# Patient Record
Sex: FEMALE | Race: BLACK OR AFRICAN AMERICAN | NOT HISPANIC OR LATINO | URBAN - METROPOLITAN AREA
[De-identification: names, ages, dates, MRNs, and addresses within clinical notes are randomized per-mention and may not be internally consistent; named-entity substitution may affect disease eponyms.]

---

## 2017-06-17 ENCOUNTER — INPATIENT (INPATIENT)
Facility: HOSPITAL | Age: 82
LOS: 9 days | Discharge: NURSING, REHAB OR LONG-TERM CARE | End: 2017-06-27
Attending: HOSPITALIST | Admitting: INTERNAL MEDICINE
Payer: MEDICARE

## 2017-06-17 VITALS
HEIGHT: 65 IN | OXYGEN SATURATION: 100 % | TEMPERATURE: 98 F | RESPIRATION RATE: 18 BRPM | HEART RATE: 81 BPM | WEIGHT: 130.07 LBS | SYSTOLIC BLOOD PRESSURE: 147 MMHG | DIASTOLIC BLOOD PRESSURE: 70 MMHG

## 2017-06-17 DIAGNOSIS — Z89.512 ACQUIRED ABSENCE OF LEFT LEG BELOW KNEE: Chronic | ICD-10-CM

## 2017-06-17 LAB
ALBUMIN SERPL ELPH-MCNC: 2.7 G/DL — LOW (ref 3.3–5)
ALP SERPL-CCNC: 113 U/L — SIGNIFICANT CHANGE UP (ref 40–120)
ALT FLD-CCNC: 11 U/L — LOW (ref 12–78)
ANION GAP SERPL CALC-SCNC: 5 MMOL/L — SIGNIFICANT CHANGE UP (ref 5–17)
ANION GAP SERPL CALC-SCNC: 6 MMOL/L — SIGNIFICANT CHANGE UP (ref 5–17)
APPEARANCE UR: (no result)
AST SERPL-CCNC: 14 U/L — LOW (ref 15–37)
BACTERIA # UR AUTO: (no result)
BASE EXCESS BLDA CALC-SCNC: 3 MMOL/L — HIGH (ref -2–2)
BASOPHILS # BLD AUTO: 0.1 K/UL — SIGNIFICANT CHANGE UP (ref 0–0.2)
BASOPHILS NFR BLD AUTO: 0.8 % — SIGNIFICANT CHANGE UP (ref 0–2)
BILIRUB SERPL-MCNC: 0.4 MG/DL — SIGNIFICANT CHANGE UP (ref 0.2–1.2)
BILIRUB UR-MCNC: NEGATIVE — SIGNIFICANT CHANGE UP
BUN SERPL-MCNC: 16 MG/DL — SIGNIFICANT CHANGE UP (ref 7–23)
BUN SERPL-MCNC: 20 MG/DL — SIGNIFICANT CHANGE UP (ref 7–23)
CALCIUM SERPL-MCNC: 8.5 MG/DL — SIGNIFICANT CHANGE UP (ref 8.5–10.1)
CALCIUM SERPL-MCNC: 8.9 MG/DL — SIGNIFICANT CHANGE UP (ref 8.5–10.1)
CHLORIDE SERPL-SCNC: 115 MMOL/L — HIGH (ref 96–108)
CHLORIDE SERPL-SCNC: 129 MMOL/L — HIGH (ref 96–108)
CO2 SERPL-SCNC: 27 MMOL/L — SIGNIFICANT CHANGE UP (ref 22–31)
CO2 SERPL-SCNC: 31 MMOL/L — SIGNIFICANT CHANGE UP (ref 22–31)
COLOR SPEC: YELLOW — SIGNIFICANT CHANGE UP
CREAT SERPL-MCNC: 1.01 MG/DL — SIGNIFICANT CHANGE UP (ref 0.5–1.3)
CREAT SERPL-MCNC: 1.02 MG/DL — SIGNIFICANT CHANGE UP (ref 0.5–1.3)
DIFF PNL FLD: (no result)
EOSINOPHIL # BLD AUTO: 0.1 K/UL — SIGNIFICANT CHANGE UP (ref 0–0.5)
EOSINOPHIL NFR BLD AUTO: 0.9 % — SIGNIFICANT CHANGE UP (ref 0–6)
EPI CELLS # UR: SIGNIFICANT CHANGE UP
GAS PNL BLDA: SIGNIFICANT CHANGE UP
GLUCOSE SERPL-MCNC: 248 MG/DL — HIGH (ref 70–99)
GLUCOSE SERPL-MCNC: 512 MG/DL — CRITICAL HIGH (ref 70–99)
GLUCOSE UR QL: 250 MG/DL
HCO3 BLDA-SCNC: 28 MMOL/L — SIGNIFICANT CHANGE UP (ref 21–29)
HCT VFR BLD CALC: 45.2 % — HIGH (ref 34.5–45)
HGB BLD-MCNC: 14.1 G/DL — SIGNIFICANT CHANGE UP (ref 11.5–15.5)
KETONES UR-MCNC: NEGATIVE — SIGNIFICANT CHANGE UP
LACTATE SERPL-SCNC: 1.6 MMOL/L — SIGNIFICANT CHANGE UP (ref 0.7–2)
LEUKOCYTE ESTERASE UR-ACNC: (no result)
LYMPHOCYTES # BLD AUTO: 2.9 K/UL — SIGNIFICANT CHANGE UP (ref 1–3.3)
LYMPHOCYTES # BLD AUTO: 26.3 % — SIGNIFICANT CHANGE UP (ref 13–44)
MAGNESIUM SERPL-MCNC: 2.2 MG/DL — SIGNIFICANT CHANGE UP (ref 1.6–2.6)
MCHC RBC-ENTMCNC: 29.7 PG — SIGNIFICANT CHANGE UP (ref 27–34)
MCHC RBC-ENTMCNC: 31.1 GM/DL — LOW (ref 32–36)
MCV RBC AUTO: 95.3 FL — SIGNIFICANT CHANGE UP (ref 80–100)
MONOCYTES # BLD AUTO: 0.8 K/UL — SIGNIFICANT CHANGE UP (ref 0–0.9)
MONOCYTES NFR BLD AUTO: 7 % — SIGNIFICANT CHANGE UP (ref 2–14)
NEUTROPHILS # BLD AUTO: 7.2 K/UL — SIGNIFICANT CHANGE UP (ref 1.8–7.4)
NEUTROPHILS NFR BLD AUTO: 64.9 % — SIGNIFICANT CHANGE UP (ref 43–77)
NITRITE UR-MCNC: NEGATIVE — SIGNIFICANT CHANGE UP
PCO2 BLDA: 44 MMHG — SIGNIFICANT CHANGE UP (ref 32–46)
PH BLDA: 7.42 — SIGNIFICANT CHANGE UP (ref 7.35–7.45)
PH UR: 6 — SIGNIFICANT CHANGE UP (ref 5–8)
PLATELET # BLD AUTO: 156 K/UL — SIGNIFICANT CHANGE UP (ref 150–400)
PO2 BLDA: 93 — SIGNIFICANT CHANGE UP
POTASSIUM SERPL-MCNC: 3.2 MMOL/L — LOW (ref 3.5–5.3)
POTASSIUM SERPL-MCNC: 3.6 MMOL/L — SIGNIFICANT CHANGE UP (ref 3.5–5.3)
POTASSIUM SERPL-SCNC: 3.2 MMOL/L — LOW (ref 3.5–5.3)
POTASSIUM SERPL-SCNC: 3.6 MMOL/L — SIGNIFICANT CHANGE UP (ref 3.5–5.3)
PROT SERPL-MCNC: 6.7 GM/DL — SIGNIFICANT CHANGE UP (ref 6–8.3)
PROT UR-MCNC: 30 MG/DL
RBC # BLD: 4.74 M/UL — SIGNIFICANT CHANGE UP (ref 3.8–5.2)
RBC # FLD: 14.1 % — SIGNIFICANT CHANGE UP (ref 10.3–14.5)
RBC CASTS # UR COMP ASSIST: (no result) /HPF (ref 0–4)
SAO2 % BLDA: 97 — SIGNIFICANT CHANGE UP
SODIUM SERPL-SCNC: 148 MMOL/L — HIGH (ref 135–145)
SODIUM SERPL-SCNC: 165 MMOL/L — CRITICAL HIGH (ref 135–145)
SP GR SPEC: 1.02 — SIGNIFICANT CHANGE UP (ref 1.01–1.02)
TROPONIN I SERPL-MCNC: <0.015 NG/ML — SIGNIFICANT CHANGE UP (ref 0.01–0.04)
UROBILINOGEN FLD QL: NEGATIVE MG/DL — SIGNIFICANT CHANGE UP
WBC # BLD: 11.1 K/UL — HIGH (ref 3.8–10.5)
WBC # FLD AUTO: 11.1 K/UL — HIGH (ref 3.8–10.5)
WBC UR QL: >50

## 2017-06-17 PROCEDURE — 93010 ELECTROCARDIOGRAM REPORT: CPT

## 2017-06-17 PROCEDURE — 71010: CPT | Mod: 26

## 2017-06-17 PROCEDURE — 71250 CT THORAX DX C-: CPT | Mod: 26

## 2017-06-17 PROCEDURE — 99285 EMERGENCY DEPT VISIT HI MDM: CPT

## 2017-06-17 RX ORDER — DEXTROSE 50 % IN WATER 50 %
25 SYRINGE (ML) INTRAVENOUS ONCE
Qty: 0 | Refills: 0 | Status: DISCONTINUED | OUTPATIENT
Start: 2017-06-17 | End: 2017-06-27

## 2017-06-17 RX ORDER — DEXTROSE 50 % IN WATER 50 %
1 SYRINGE (ML) INTRAVENOUS ONCE
Qty: 0 | Refills: 0 | Status: DISCONTINUED | OUTPATIENT
Start: 2017-06-17 | End: 2017-06-27

## 2017-06-17 RX ORDER — IPRATROPIUM/ALBUTEROL SULFATE 18-103MCG
3 AEROSOL WITH ADAPTER (GRAM) INHALATION ONCE
Qty: 0 | Refills: 0 | Status: COMPLETED | OUTPATIENT
Start: 2017-06-17 | End: 2017-06-17

## 2017-06-17 RX ORDER — VANCOMYCIN HCL 1 G
1000 VIAL (EA) INTRAVENOUS ONCE
Qty: 0 | Refills: 0 | Status: COMPLETED | OUTPATIENT
Start: 2017-06-17 | End: 2017-06-17

## 2017-06-17 RX ORDER — INSULIN GLARGINE 100 [IU]/ML
35 INJECTION, SOLUTION SUBCUTANEOUS ONCE
Qty: 0 | Refills: 0 | Status: COMPLETED | OUTPATIENT
Start: 2017-06-17 | End: 2017-06-17

## 2017-06-17 RX ORDER — ASPIRIN/CALCIUM CARB/MAGNESIUM 324 MG
81 TABLET ORAL DAILY
Qty: 0 | Refills: 0 | Status: DISCONTINUED | OUTPATIENT
Start: 2017-06-17 | End: 2017-06-21

## 2017-06-17 RX ORDER — GLUCAGON INJECTION, SOLUTION 0.5 MG/.1ML
1 INJECTION, SOLUTION SUBCUTANEOUS ONCE
Qty: 0 | Refills: 0 | Status: DISCONTINUED | OUTPATIENT
Start: 2017-06-17 | End: 2017-06-27

## 2017-06-17 RX ORDER — HEPARIN SODIUM 5000 [USP'U]/ML
5000 INJECTION INTRAVENOUS; SUBCUTANEOUS EVERY 8 HOURS
Qty: 0 | Refills: 0 | Status: DISCONTINUED | OUTPATIENT
Start: 2017-06-17 | End: 2017-06-22

## 2017-06-17 RX ORDER — CLOPIDOGREL BISULFATE 75 MG/1
75 TABLET, FILM COATED ORAL DAILY
Qty: 0 | Refills: 0 | Status: DISCONTINUED | OUTPATIENT
Start: 2017-06-17 | End: 2017-06-27

## 2017-06-17 RX ORDER — DOCUSATE SODIUM 100 MG
200 CAPSULE ORAL DAILY
Qty: 0 | Refills: 0 | Status: DISCONTINUED | OUTPATIENT
Start: 2017-06-17 | End: 2017-06-27

## 2017-06-17 RX ORDER — CEFEPIME 1 G/1
1000 INJECTION, POWDER, FOR SOLUTION INTRAMUSCULAR; INTRAVENOUS ONCE
Qty: 0 | Refills: 0 | Status: COMPLETED | OUTPATIENT
Start: 2017-06-17 | End: 2017-06-17

## 2017-06-17 RX ORDER — POTASSIUM CHLORIDE 20 MEQ
10 PACKET (EA) ORAL DAILY
Qty: 0 | Refills: 0 | Status: DISCONTINUED | OUTPATIENT
Start: 2017-06-17 | End: 2017-06-21

## 2017-06-17 RX ORDER — SODIUM CHLORIDE 9 MG/ML
1000 INJECTION, SOLUTION INTRAVENOUS
Qty: 0 | Refills: 0 | Status: DISCONTINUED | OUTPATIENT
Start: 2017-06-17 | End: 2017-06-27

## 2017-06-17 RX ORDER — MEMANTINE HYDROCHLORIDE 10 MG/1
10 TABLET ORAL
Qty: 0 | Refills: 0 | Status: DISCONTINUED | OUTPATIENT
Start: 2017-06-17 | End: 2017-06-27

## 2017-06-17 RX ORDER — INSULIN LISPRO 100/ML
VIAL (ML) SUBCUTANEOUS
Qty: 0 | Refills: 0 | Status: DISCONTINUED | OUTPATIENT
Start: 2017-06-17 | End: 2017-06-27

## 2017-06-17 RX ORDER — INSULIN GLARGINE 100 [IU]/ML
35 INJECTION, SOLUTION SUBCUTANEOUS AT BEDTIME
Qty: 0 | Refills: 0 | Status: DISCONTINUED | OUTPATIENT
Start: 2017-06-17 | End: 2017-06-21

## 2017-06-17 RX ORDER — DEXTROSE 50 % IN WATER 50 %
12.5 SYRINGE (ML) INTRAVENOUS ONCE
Qty: 0 | Refills: 0 | Status: DISCONTINUED | OUTPATIENT
Start: 2017-06-17 | End: 2017-06-27

## 2017-06-17 RX ORDER — POTASSIUM CHLORIDE 20 MEQ
40 PACKET (EA) ORAL ONCE
Qty: 0 | Refills: 0 | Status: COMPLETED | OUTPATIENT
Start: 2017-06-17 | End: 2017-06-17

## 2017-06-17 RX ORDER — METOPROLOL TARTRATE 50 MG
12.5 TABLET ORAL
Qty: 0 | Refills: 0 | Status: DISCONTINUED | OUTPATIENT
Start: 2017-06-17 | End: 2017-06-27

## 2017-06-17 RX ORDER — SODIUM CHLORIDE 9 MG/ML
1000 INJECTION INTRAMUSCULAR; INTRAVENOUS; SUBCUTANEOUS
Qty: 0 | Refills: 0 | Status: DISCONTINUED | OUTPATIENT
Start: 2017-06-17 | End: 2017-06-18

## 2017-06-17 RX ORDER — MIRTAZAPINE 45 MG/1
15 TABLET, ORALLY DISINTEGRATING ORAL AT BEDTIME
Qty: 0 | Refills: 0 | Status: DISCONTINUED | OUTPATIENT
Start: 2017-06-17 | End: 2017-06-27

## 2017-06-17 RX ORDER — SIMVASTATIN 20 MG/1
10 TABLET, FILM COATED ORAL AT BEDTIME
Qty: 0 | Refills: 0 | Status: DISCONTINUED | OUTPATIENT
Start: 2017-06-17 | End: 2017-06-27

## 2017-06-17 RX ORDER — DOCUSATE SODIUM 100 MG
200 CAPSULE ORAL DAILY
Qty: 0 | Refills: 0 | Status: DISCONTINUED | OUTPATIENT
Start: 2017-06-17 | End: 2017-06-17

## 2017-06-17 RX ORDER — CEFEPIME 1 G/1
2000 INJECTION, POWDER, FOR SOLUTION INTRAMUSCULAR; INTRAVENOUS EVERY 12 HOURS
Qty: 0 | Refills: 0 | Status: DISCONTINUED | OUTPATIENT
Start: 2017-06-17 | End: 2017-06-18

## 2017-06-17 RX ADMIN — Medication 250 MILLIGRAM(S): at 19:55

## 2017-06-17 RX ADMIN — Medication 40 MILLIEQUIVALENT(S): at 23:05

## 2017-06-17 RX ADMIN — Medication 3 MILLILITER(S): at 16:35

## 2017-06-17 RX ADMIN — CEFEPIME 100 MILLIGRAM(S): 1 INJECTION, POWDER, FOR SOLUTION INTRAMUSCULAR; INTRAVENOUS at 19:35

## 2017-06-17 RX ADMIN — INSULIN GLARGINE 35 UNIT(S): 100 INJECTION, SOLUTION SUBCUTANEOUS at 23:00

## 2017-06-17 NOTE — ED PROVIDER NOTE - MEDICAL DECISION MAKING DETAILS
Pt with bilateral lower lob pna, hypernatremia to 165, normal Cr.  LA 1.6.  Given Vanc, Cefepime for HCAP.  Pt oriented to self, which is her baseline according to family.  IVF in abx total 300 cc.  Will repeat BMP after abx, to determine if correcting Na too fast or not.  If not coming down too fast, will start on D5 @ 70 cc/hr.

## 2017-06-17 NOTE — H&P ADULT - PMH
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

## 2017-06-17 NOTE — ED PROVIDER NOTE - OBJECTIVE STATEMENT
84 yo hx of DMII, PVD s/p R BKA, dementia, presents with CC of lethargy, cough.  Cough X 2 weeks, dry, nonproductive.  C/o nonbilious vomiting episode yesterday, for which she received IVF.  Had labs and CXR a week ago, which was apparently normal.  Was brought to ED today because family concerned about patient being more lethargic.  Pt with dementia, unable to participate in ROS or HPI.  Family denies any other concerns at this time.

## 2017-06-17 NOTE — H&P ADULT - NSHPSOCIALHISTORY_GEN_ALL_CORE
unable to obtain at this time  Comes from Living facility unable to obtain at this time  Comes from Assisted Living facility

## 2017-06-17 NOTE — H&P ADULT - PROBLEM SELECTOR PLAN 5
Hx of Atrial Fib  1 On tele monitor is in SR, Brief Run on SVT   2 Pt rate controlled with Cardizem and Metoprolol  3Not on Any Anticoagulation as per med review , Will need more information regarding why pt is not on Anticoagulation   CHADsVasc is 6

## 2017-06-17 NOTE — ED ADULT NURSE NOTE - OBJECTIVE STATEMENT
Pt BIBA from [facility] at daughters request for cough for 3 weeks and SOB and lethargy. Pt has dementia, pt's daughter reports she is at baseline mental status. Per pt's daughter, pt vomited and was given IV fluids at [facility] last night. Pt has left BKA from Jan 2014. Pt has no LE edema on right side. Pt has not been coughing since arrival.  Pt is lethargic.

## 2017-06-17 NOTE — ED ADULT NURSE REASSESSMENT NOTE - NS ED NURSE REASSESS COMMENT FT1
Pt cleaned and linens changed, pt straight cathed as per MD order, urine collected and sent as per order. Collected approx 75cc or urine during catheterization. Pt repositioned for comfort. Slight swelling noted to the right arm, IV checked and verified by two RNs, blood pressure cuff moved to opposite arm. MD Gardiner made aware of swelling, extremity elevated on pillows. Awaiting hospitalist evaluation and admission orders. Will continue to monitor.

## 2017-06-17 NOTE — H&P ADULT - ASSESSMENT
82 y/o F with Pmhx of DM Type II on INsulin (LAst A1c 10.6), PVD s/p Left  BKA, dementia, Afib, Major depression, HTN who presented to the ED with C/o lethargy, cough and previous vomiting

## 2017-06-17 NOTE — ED ADULT NURSE REASSESSMENT NOTE - COMFORT CARE
side rails up/warm blanket provided/family is present at bedside, patient is sleeping, brief is clean, hourly rounding completed

## 2017-06-17 NOTE — ED ADULT NURSE REASSESSMENT NOTE - NS ED NURSE REASSESS COMMENT FT1
MD Horan, hospitalist evaluated pt for admission. Pt remains on bedside monitor. Phlebotomy ronny additional lab work. Pt sitting up in stretcher in no apparent distress, pt repositioned for comfort. Will continue to monitor.

## 2017-06-17 NOTE — H&P ADULT - HISTORY OF PRESENT ILLNESS
No family availbale at bedside and pt has dementia . Hx obtained from Chart /ED physician Dr. Gardiner who spoke to the family and Nursing facility Chart     Pt is a  84 y/o F with Pmhx of DM Type II on INsulin (LAst A1c 10.6), PVD s/p Left  BKA, dementia,Afib,Major depression, HTN who presented to the ED with C/o lethargy, cough.  Cough X 2 weeks, dry, nonproductive. She is alert at baseline but not oriented and has underlying dementia. Non verbal and req held with feeding. Incontinent with bladder and bowel. There are also C/o nonbilious vomiting episode yesterday, for which she received IVF in the facility .She Had labs and CXR a week ago, which shows Na as 148 and Cr. as 0.7 GFR was >60.  Was brought to ED today because family concerned about patient being more lethargic.   in ED : given Vanco + Cefepime CT scan showed possible b/l PNA.  Sodiumwas elevated at 165 (Corrected sodium was 169) No family availbale at bedside and pt has dementia . Hx obtained from Chart /ED physician Dr. Gardiner who spoke to the family and Nursing facility Chart     Pt is a  82 y/o F with Pmhx of DM Type II on INsulin (LAst A1c 10.6), PVD s/p Left  BKA, dementia,Afib,Major depression, HTN who presented to the ED with C/o lethargy, cough.  Cough X 2 weeks, dry, nonproductive. She is alert at baseline but not oriented and has underlying dementia. Non verbal and req held with feeding. Incontinent with bladder and bowel. There are also C/o nonbilious vomiting episode yesterday, for which she received IVF in the facility .She Had labs and CXR a week ago, which shows Na as 148 and Cr. as 0.7 GFR was >60.  Was brought to ED today because family concerned about patient being more lethargic.   in ED : given Vanco  1 gm + Cefepime 1gm ,CT scan showed possible b/l PNA.  Sodium was elevated at 165 (Corrected sodium was 169) which rapidly corrected to 155 (corrected ) after 300 ml of D5 given over 90 min in conjunction with Abx. No family availbale at bedside and pt has dementia . Hx obtained from Chart /ED physician Dr. Gardiner who spoke to the family and Nursing facility Chart     Pt is a  82 y/o F with Pmhx of DM Type II on INsulin (LAst A1c 10.6), PVD s/p Left  BKA, dementia, Afib, Major depression, HTN who presented to the ED with C/o lethargy, cough.         Cough X 2 weeks, dry, nonproductive. She is alert at baseline but not oriented and has underlying dementia. Non verbal and req held with feeding. Incontinent with bladder and bowel. There are also C/o nonbilious vomiting episode yesterday, for which she received IVF in the facility .She Had labs and CXR a week ago, which shows Na as 148 and Cr as 0.7 GFR was >60.  Was brought to ED today because family concerned about patient being more lethargic.   in ED : given Vanco  1 gm + Cefepime 1gm, CT scan of chest showed possible b/l PNA.  Sodium was elevated at 165 (Corrected sodium was 169) which rapidly corrected to 155 (corrected ) after 300 ml of D5 given over 90 min in conjunction with Abx.  On telemetry she had multiple beat run of SVT

## 2017-06-17 NOTE — H&P ADULT - PROBLEM SELECTOR PLAN 6
DMII,Last A1C is 10.6 (as per chart in June )  1 Cont with Lantus (at home on levemir ) 35 units HS   2 COnt with ISS   3 On a statin DMII,w poor control as last Hb A1C is 10.6 (as per chart in June )  1 Cont with Lantus (at home on levemir ) 35 units HS   2 Cont with ISS   3 On a statin

## 2017-06-17 NOTE — H&P ADULT - PROBLEM SELECTOR PLAN 1
Admit to Tele , pt had few beats of SVT and has hx of Afib.   Admit under Dr. Reyes and t/f to Dr. Roberts in am   1 PNA with suspected GNR , pt comes from nursing facility   2 CHest CT reviewed , possible PNA right >left   3 Given Vancomycin and Cefepime in ED  - Will cont Cefepime 2 gm IV Q12H (Renally Dosed)  4 ID consult   5 supplemental O2   6 Supportive Care   Pt able to pass bedside swallow eval.  - Does not meet SIRS criteria , lactate 1.6 Admit to Tele , pt had few beats of SVT and has hx of Afib.   Admit under Dr. Horan and t/f to Dr. Roberts in am   1. PNA with suspected GNR , pt comes from nursing facility   2 CHest CT reviewed , possible PNA right >left   3 Given Vancomycin and Cefepime in ED  - Will cont Cefepime 2 gm IV Q12H (Renally Dosed)  4 ID consult   5 supplemental O2   6 Supportive Care   Pt able to pass bedside swallow eval.  - Does not meet SIRS criteria , lactate 1.6

## 2017-06-17 NOTE — ED ADULT NURSE REASSESSMENT NOTE - NS ED NURSE REASSESS COMMENT FT1
Dr. Gardiner to the bedside to speak to family members x 2 in regards of plan of care - Family verbalizes understanding of plan of care and possible admission to the hospital- cardiac monitor in place- IV Antibiotics infusing as ordered. Will cont to monitor- safety maintained- Family remains at the bedside-

## 2017-06-17 NOTE — H&P ADULT - PROBLEM SELECTOR PLAN 2
Likely due to GI loss and Dec PO intake  1 Free water defecit is 5.5 L   2 Given 300 Ml D5 in 90 min , Corrected Na improved to 155 meq   3 Target goal of 10 meq in 24 hr achieved, Will stop Further correction  4 Give NS @ 75 ml/he as maintenance fluids Likely due to GI loss and decreased PO intake  1 Free water defecit is 5.5 L   2 Given 300 ml D5W in 90 min , Corrected Na improved to 155 meq   3 Target goal of 10 meq in 24 hr achieved, Will stop Further correction  4 Give NS @ 75 ml/hr as maintenance fluids  5. repeat BMP in AM

## 2017-06-17 NOTE — ED PROVIDER NOTE - PMH
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

## 2017-06-17 NOTE — ED ADULT NURSE REASSESSMENT NOTE - NS ED NURSE REASSESS COMMENT FT1
Several attempts made by multiple RNs to obtain urine via straight catheterization. Unable to obtain urine sample. Dr Gardiner made aware

## 2017-06-17 NOTE — ED ADULT NURSE REASSESSMENT NOTE - NS ED NURSE REASSESS COMMENT FT1
Care of pt received from Michelle Puri RN, pt resting in stretcher at time of report on bedside monitor. RN at pt bedside collecting additional labs, family at bedside. Pt to be medicated after additional lab work collected. Will continue to monitor.

## 2017-06-17 NOTE — H&P ADULT - NSHPLABSRESULTS_GEN_ALL_CORE
CARDIAC MARKERS ( 2017 17:47 )  <0.015 ng/mL / x     / x     / x     / x                                14.1   11.1  )-----------( 156      ( 2017 17:47 )             45.2     2017 21:38    148    |  115    |  16     ----------------------------<  512    3.2     |  27     |  1.02     Ca    8.5        2017 21:38  Mg     2.2       2017 17:47    TPro  6.7    /  Alb  2.7    /  TBili  0.4    /  DBili  x      /  AST  14     /  ALT  11     /  AlkPhos  113    2017 17:47      CAPILLARY BLOOD GLUCOSE  201 (2017 15:59)    LIVER FUNCTIONS - ( 2017 17:47 )  Alb: 2.7 g/dL / Pro: 6.7 gm/dL / ALK PHOS: 113 U/L / ALT: 11 U/L / AST: 14 U/L / GGT: x           Urinalysis Basic - ( 2017 21:37 )    Color: Yellow / Appearance: very cloudy / S.025 / pH: x  Gluc: x / Ketone: Negative  / Bili: Negative / Urobili: Negative mg/dL   Blood: x / Protein: 30 mg/dL / Nitrite: Negative   Leuk Esterase: Moderate / RBC: 25-50 /HPF / WBC >50   Sq Epi: x / Non Sq Epi: Few / Bacteria: Moderate      CT chest 17    IMPRESSION:    Patchy airspace opacities at the lung bases which may be infectious in  etiology. Follow-up is suggested until resolution.    Indeterminate 1 cm exophytic lesion along the interpolar left kidney.   Ultrasound suggested for further evaluation.    4 mm pulmonary nodule in the right upper lobe. CARDIAC MARKERS ( 2017 17:47 )  <0.015 ng/mL / x     / x     / x     / x                                14.1   11.1  )-----------( 156      ( 2017 17:47 )             45.2     2017 21:38    148    |  115    |  16     ----------------------------<  512    3.2     |  27     |  1.02     Ca    8.5        2017 21:38  Mg     2.2       2017 17:47    TPro  6.7    /  Alb  2.7    /  TBili  0.4    /  DBili  x      /  AST  14     /  ALT  11     /  AlkPhos  113    2017 17:47      CAPILLARY BLOOD GLUCOSE  201 (2017 15:59)    LIVER FUNCTIONS - ( 2017 17:47 )  Alb: 2.7 g/dL / Pro: 6.7 gm/dL / ALK PHOS: 113 U/L / ALT: 11 U/L / AST: 14 U/L / GGT: x           Urinalysis Basic - ( 2017 21:37 )    Color: Yellow / Appearance: very cloudy / S.025 / pH: x  Gluc: x / Ketone: Negative  / Bili: Negative / Urobili: Negative mg/dL   Blood: x / Protein: 30 mg/dL / Nitrite: Negative   Leuk Esterase: Moderate / RBC: 25-50 /HPF / WBC >50   Sq Epi: x / Non Sq Epi: Few / Bacteria: Moderate      CT chest 17    IMPRESSION:  Patchy airspace opacities at the lung bases which may be infectious in  etiology. Follow-up is suggested until resolution.    Indeterminate 1 cm exophytic lesion along the interpolar left kidney.   Ultrasound suggested for further evaluation.    4 mm pulmonary nodule in the right upper lobe.    CXR: (unofficial) poss infiltrates both lower lung fields    EKG: CARDIAC MARKERS ( 2017 17:47 )  <0.015 ng/mL / x     / x     / x     / x                                14.1   11.1  )-----------( 156      ( 2017 17:47 )             45.2     2017 21:38    148    |  115    |  16     ----------------------------<  512    3.2     |  27     |  1.02     Ca    8.5        2017 21:38  Mg     2.2       2017 17:47    TPro  6.7    /  Alb  2.7    /  TBili  0.4    /  DBili  x      /  AST  14     /  ALT  11     /  AlkPhos  113    2017 17:47      CAPILLARY BLOOD GLUCOSE  201 (2017 15:59)    LIVER FUNCTIONS - ( 2017 17:47 )  Alb: 2.7 g/dL / Pro: 6.7 gm/dL / ALK PHOS: 113 U/L / ALT: 11 U/L / AST: 14 U/L / GGT: x           Urinalysis Basic - ( 2017 21:37 )    Color: Yellow / Appearance: very cloudy / S.025 / pH: x  Gluc: x / Ketone: Negative  / Bili: Negative / Urobili: Negative mg/dL   Blood: x / Protein: 30 mg/dL / Nitrite: Negative   Leuk Esterase: Moderate / RBC: 25-50 /HPF / WBC >50   Sq Epi: x / Non Sq Epi: Few / Bacteria: Moderate      CT chest 17    IMPRESSION:  Patchy airspace opacities at the lung bases which may be infectious in  etiology. Follow-up is suggested until resolution.    Indeterminate 1 cm exophytic lesion along the interpolar left kidney.   Ultrasound suggested for further evaluation.    4 mm pulmonary nodule in the right upper lobe.    CXR: (unofficial) poss infiltrates both lower lung fields    EKG: SR 87 w baseline artifact

## 2017-06-17 NOTE — ED ADULT NURSE REASSESSMENT NOTE - NS ED NURSE REASSESS COMMENT FT1
Pt has small run of PAMTMD Gardiner made aware, pt in no apparent distress and is being medicated as per MD order, copy of strip in pt chart. Pt remains on bedside monitor. Pt and pt family updated as to results and plan for admission. Explained to family admission process and pt status and pt family verbalized understanding of need for pt to be admitted and the process. Pt family given hospital phone number to call for updates and have no questions at this time. Awaiting hospitalist evaluation and admission orders. Will continue to monitor.

## 2017-06-17 NOTE — ED ADULT NURSE REASSESSMENT NOTE - NS ED NURSE REASSESS COMMENT FT1
Pt daughter and son at law at bedside given update on plan of care. Pt is currently sleeping. Pt appears in no acute respiratory distress, respirations even and non labored.

## 2017-06-17 NOTE — H&P ADULT - ATTENDING COMMENTS
Pt history and data and physical exam reviewed w Dr. Camargo and pt was examined by me  Hx and exam as edited above    83 yr old female w HTN, AF not on AC, PVD, dementia, DM II w poor control presented to  ED from [facility] w lethargy and cough  CT chest revealed infiltrates bilat. that are not seen well by me on CXR  Has Tmax 100.3 and leukocytosis as well as hypernatremia and hyperglycemia    agree w A/P as outlined

## 2017-06-17 NOTE — H&P ADULT - NSHPPHYSICALEXAM_GEN_ALL_CORE
ICU Vital Signs Last 24 Hrs  T(C): 37.1, Max: 37.9 (06-17 @ 16:50)  T(F): 98.7, Max: 100.3 (06-17 @ 16:50)  HR: 86 (81 - 89)  BP: 114/59 (114/59 - 147/70)  RR: 16 (16 - 20)  SpO2: 100% (99% - 100%)    PHYSICAL EXAM:    GENERAL: Lethargic , comfortable in bed.  HEAD:  NC/AT  EYES: EOMI, PERRLA, no scleral icterus  HEENT: Moist mucous membranes  NECK: Supple, No JVD  CNS:  Lethargic but arousable. Non verbal at baseline. rest non focal.  LUNG: Clear to auscultation bilaterally; No rales, rhonchi, wheezing, or rubs  HEART: RRR; No murmurs, rubs, or gallops  ABDOMEN: +BS, ST/ND/NT  GENITOURINARY- Voiding, Bladder not distended  EXTREMITIES:  2+ Peripheral Pulses, No clubbing, cyanosis, or edema, left LE BKA   MUSCULOSKELTAL- Joints normal ROM, no Muscle or joint tenderness ,left LE BKA   VAGINAL: deferred  RECTAL: deferred, not indicated  BREAST: deferred ICU Vital Signs Last 24 Hrs  T(C): 37.1, Max: 37.9 (06-17 @ 16:50)  T(F): 98.7, Max: 100.3 (06-17 @ 16:50)  HR: 86 (81 - 89)  BP: 114/59 (114/59 - 147/70)  RR: 16 (16 - 20)  SpO2: 100% (99% - 100%)    PHYSICAL EXAM:    GENERAL: Lethargic , comfortable in bed.  HEAD:  NC/AT  EYES: EOMI, PERRLA, no scleral icterus  HEENT: Moist mucous membranes  NECK: Supple, No JVD  CNS:  Lethargic but arousable. Non verbal at baseline. rest of exam is non focal.  LUNG: Clear to auscultation bilaterally; No rales, rhonchi, wheezing, or rubs// decreased BS at both bases  CHEST WALL: no lesions   no retractions  HEART: RRR; No murmurs, rubs, or gallops  ABDOMEN: +BS, ST/ND/NT  GENITOURINARY- Voiding, Bladder not distended  EXTREMITIES:  2+ Peripheral Pulses, No clubbing, cyanosis, or edema, left LE BKA   MUSCULOSKELTAL- Joints normal ROM, no Muscle or joint tenderness ,left LE BKA   VAGINAL: deferred  RECTAL: deferred, not indicated  BREAST: deferred  SKIN healed decub ulcer over sacrum

## 2017-06-18 DIAGNOSIS — I73.9 PERIPHERAL VASCULAR DISEASE, UNSPECIFIED: ICD-10-CM

## 2017-06-18 DIAGNOSIS — E78.5 HYPERLIPIDEMIA, UNSPECIFIED: ICD-10-CM

## 2017-06-18 DIAGNOSIS — I48.91 UNSPECIFIED ATRIAL FIBRILLATION: ICD-10-CM

## 2017-06-18 DIAGNOSIS — E87.0 HYPEROSMOLALITY AND HYPERNATREMIA: ICD-10-CM

## 2017-06-18 DIAGNOSIS — I10 ESSENTIAL (PRIMARY) HYPERTENSION: ICD-10-CM

## 2017-06-18 DIAGNOSIS — Z29.9 ENCOUNTER FOR PROPHYLACTIC MEASURES, UNSPECIFIED: ICD-10-CM

## 2017-06-18 DIAGNOSIS — E11.9 TYPE 2 DIABETES MELLITUS WITHOUT COMPLICATIONS: ICD-10-CM

## 2017-06-18 DIAGNOSIS — F32.9 MAJOR DEPRESSIVE DISORDER, SINGLE EPISODE, UNSPECIFIED: ICD-10-CM

## 2017-06-18 DIAGNOSIS — J18.9 PNEUMONIA, UNSPECIFIED ORGANISM: ICD-10-CM

## 2017-06-18 DIAGNOSIS — F03.90 UNSPECIFIED DEMENTIA WITHOUT BEHAVIORAL DISTURBANCE: ICD-10-CM

## 2017-06-18 LAB
ANION GAP SERPL CALC-SCNC: 1 MMOL/L — LOW (ref 5–17)
ANION GAP SERPL CALC-SCNC: 3 MMOL/L — LOW (ref 5–17)
ANION GAP SERPL CALC-SCNC: 5 MMOL/L — SIGNIFICANT CHANGE UP (ref 5–17)
BUN SERPL-MCNC: 15 MG/DL — SIGNIFICANT CHANGE UP (ref 7–23)
BUN SERPL-MCNC: 16 MG/DL — SIGNIFICANT CHANGE UP (ref 7–23)
BUN SERPL-MCNC: 16 MG/DL — SIGNIFICANT CHANGE UP (ref 7–23)
CALCIUM SERPL-MCNC: 8.8 MG/DL — SIGNIFICANT CHANGE UP (ref 8.5–10.1)
CALCIUM SERPL-MCNC: 8.8 MG/DL — SIGNIFICANT CHANGE UP (ref 8.5–10.1)
CALCIUM SERPL-MCNC: 9 MG/DL — SIGNIFICANT CHANGE UP (ref 8.5–10.1)
CHLORIDE SERPL-SCNC: 127 MMOL/L — HIGH (ref 96–108)
CHLORIDE SERPL-SCNC: 127 MMOL/L — HIGH (ref 96–108)
CHLORIDE SERPL-SCNC: 128 MMOL/L — HIGH (ref 96–108)
CO2 SERPL-SCNC: 28 MMOL/L — SIGNIFICANT CHANGE UP (ref 22–31)
CO2 SERPL-SCNC: 30 MMOL/L — SIGNIFICANT CHANGE UP (ref 22–31)
CO2 SERPL-SCNC: 32 MMOL/L — HIGH (ref 22–31)
CREAT SERPL-MCNC: 0.68 MG/DL — SIGNIFICANT CHANGE UP (ref 0.5–1.3)
CREAT SERPL-MCNC: 0.73 MG/DL — SIGNIFICANT CHANGE UP (ref 0.5–1.3)
CREAT SERPL-MCNC: 0.81 MG/DL — SIGNIFICANT CHANGE UP (ref 0.5–1.3)
GLUCOSE SERPL-MCNC: 118 MG/DL — HIGH (ref 70–99)
GLUCOSE SERPL-MCNC: 148 MG/DL — HIGH (ref 70–99)
GLUCOSE SERPL-MCNC: 99 MG/DL — SIGNIFICANT CHANGE UP (ref 70–99)
HBA1C BLD-MCNC: 10.9 % — HIGH (ref 4–5.6)
HCT VFR BLD CALC: 42.3 % — SIGNIFICANT CHANGE UP (ref 34.5–45)
HGB BLD-MCNC: 13.3 G/DL — SIGNIFICANT CHANGE UP (ref 11.5–15.5)
MCHC RBC-ENTMCNC: 30.4 PG — SIGNIFICANT CHANGE UP (ref 27–34)
MCHC RBC-ENTMCNC: 31.5 GM/DL — LOW (ref 32–36)
MCV RBC AUTO: 96.5 FL — SIGNIFICANT CHANGE UP (ref 80–100)
PLATELET # BLD AUTO: 134 K/UL — LOW (ref 150–400)
POTASSIUM SERPL-MCNC: 3.4 MMOL/L — LOW (ref 3.5–5.3)
POTASSIUM SERPL-MCNC: 3.4 MMOL/L — LOW (ref 3.5–5.3)
POTASSIUM SERPL-MCNC: 3.6 MMOL/L — SIGNIFICANT CHANGE UP (ref 3.5–5.3)
POTASSIUM SERPL-SCNC: 3.4 MMOL/L — LOW (ref 3.5–5.3)
POTASSIUM SERPL-SCNC: 3.4 MMOL/L — LOW (ref 3.5–5.3)
POTASSIUM SERPL-SCNC: 3.6 MMOL/L — SIGNIFICANT CHANGE UP (ref 3.5–5.3)
RBC # BLD: 4.38 M/UL — SIGNIFICANT CHANGE UP (ref 3.8–5.2)
RBC # FLD: 14 % — SIGNIFICANT CHANGE UP (ref 10.3–14.5)
SODIUM SERPL-SCNC: 159 MMOL/L — HIGH (ref 135–145)
SODIUM SERPL-SCNC: 160 MMOL/L — CRITICAL HIGH (ref 135–145)
SODIUM SERPL-SCNC: 162 MMOL/L — CRITICAL HIGH (ref 135–145)
WBC # BLD: 12 K/UL — HIGH (ref 3.8–10.5)
WBC # FLD AUTO: 12 K/UL — HIGH (ref 3.8–10.5)

## 2017-06-18 RX ORDER — CEFEPIME 1 G/1
1000 INJECTION, POWDER, FOR SOLUTION INTRAMUSCULAR; INTRAVENOUS EVERY 12 HOURS
Qty: 0 | Refills: 0 | Status: DISCONTINUED | OUTPATIENT
Start: 2017-06-18 | End: 2017-06-27

## 2017-06-18 RX ORDER — SODIUM CHLORIDE 9 MG/ML
1000 INJECTION, SOLUTION INTRAVENOUS
Qty: 0 | Refills: 0 | Status: DISCONTINUED | OUTPATIENT
Start: 2017-06-18 | End: 2017-06-19

## 2017-06-18 RX ADMIN — MEMANTINE HYDROCHLORIDE 10 MILLIGRAM(S): 10 TABLET ORAL at 08:02

## 2017-06-18 RX ADMIN — Medication 10 MILLIEQUIVALENT(S): at 12:00

## 2017-06-18 RX ADMIN — CEFEPIME 100 MILLIGRAM(S): 1 INJECTION, POWDER, FOR SOLUTION INTRAMUSCULAR; INTRAVENOUS at 17:30

## 2017-06-18 RX ADMIN — CLOPIDOGREL BISULFATE 75 MILLIGRAM(S): 75 TABLET, FILM COATED ORAL at 12:00

## 2017-06-18 RX ADMIN — Medication 1 TABLET(S): at 08:02

## 2017-06-18 RX ADMIN — MEMANTINE HYDROCHLORIDE 10 MILLIGRAM(S): 10 TABLET ORAL at 17:33

## 2017-06-18 RX ADMIN — SODIUM CHLORIDE 75 MILLILITER(S): 9 INJECTION INTRAMUSCULAR; INTRAVENOUS; SUBCUTANEOUS at 01:07

## 2017-06-18 RX ADMIN — CEFEPIME 100 MILLIGRAM(S): 1 INJECTION, POWDER, FOR SOLUTION INTRAMUSCULAR; INTRAVENOUS at 08:10

## 2017-06-18 RX ADMIN — SODIUM CHLORIDE 75 MILLILITER(S): 9 INJECTION, SOLUTION INTRAVENOUS at 10:20

## 2017-06-18 RX ADMIN — Medication 81 MILLIGRAM(S): at 12:00

## 2017-06-18 RX ADMIN — Medication 12.5 MILLIGRAM(S): at 17:34

## 2017-06-18 RX ADMIN — HEPARIN SODIUM 5000 UNIT(S): 5000 INJECTION INTRAVENOUS; SUBCUTANEOUS at 22:33

## 2017-06-18 RX ADMIN — Medication 12.5 MILLIGRAM(S): at 08:01

## 2017-06-18 RX ADMIN — HEPARIN SODIUM 5000 UNIT(S): 5000 INJECTION INTRAVENOUS; SUBCUTANEOUS at 08:02

## 2017-06-18 RX ADMIN — INSULIN GLARGINE 35 UNIT(S): 100 INJECTION, SOLUTION SUBCUTANEOUS at 22:34

## 2017-06-18 RX ADMIN — HEPARIN SODIUM 5000 UNIT(S): 5000 INJECTION INTRAVENOUS; SUBCUTANEOUS at 17:30

## 2017-06-18 NOTE — DIETITIAN INITIAL EVALUATION ADULT. - FACTORS AFF FOOD INTAKE
Lethargic/change in mental status/difficulty feeding self/difficulty chewing/difficulty swallowing/other (specify)

## 2017-06-18 NOTE — DIETITIAN INITIAL EVALUATION ADULT. - SIGNS/SYMPTOMS
PO intake ~25% of daily meals. Edema, Stage 1 pressure ulcer, infection Possible difficulty chewing or swallowing, Lethargic

## 2017-06-18 NOTE — CONSULT NOTE ADULT - ASSESSMENT
82 y/o Female with h/o DM Type II, PVD s/p Left  BKA, dementia, Afib, Major depression, HTN was admitted on 6/17 for lethargy, cough. She is reported with dry, nonproductive cough X 2 weeks. She is alert at baseline but not oriented. Reported with a nonbilious vomiting episode the day prior to admission, for which she received IVF in the facility . She was brought to ED today because family concerned about patient being more lethargic.    1. Low grade fever. Multifocal pneumonia. Pyuria. Likely UTI  -episode of emesis; likely dehydration with hypernatremia  -obtain BC x 2  -agree with cefepime 1 gm IV q12h  -monitor BMP  -aspiration precautions  -monitor temps  -f/u CBC  -supportive care  2. Other issues: DM Type II, PVD s/p Left  BKA, dementia, Afib, Major depression, HTN   -care per medicine 82 y/o Female with h/o DM Type II, PVD s/p Left  BKA, dementia, Afib, Major depression, HTN was admitted on 6/17 for lethargy, cough. She is reported with dry, nonproductive cough X 2 weeks. She is alert at baseline but not oriented. Reported with a nonbilious vomiting episode the day prior to admission, for which she received IVF in the facility . She was brought to ED today because family concerned about patient being more lethargic.    1. Low grade fever. Multifocal pneumonia. Pyuria. Likely UTI. CRF stage 3.  -episode of emesis; likely dehydration with hypernatremia  -obtain BC x 2  -agree with cefepime 1 gm IV q12h  -monitor BMP  -aspiration precautions  -monitor temps  -f/u CBC  -supportive care  2. Other issues: DM Type II, PVD s/p Left  BKA, dementia, Afib, Major depression, HTN   -care per medicine

## 2017-06-18 NOTE — PATIENT PROFILE ADULT. - FALL HARM RISK
bones(Osteoporosis,prev fx,steroid use,metastatic bone ca/L bka/coagulation(Bleeding disorder R/T clinical cond/anti-coags)

## 2017-06-18 NOTE — DIETITIAN INITIAL EVALUATION ADULT. - ETIOLOGY
Pt's daughter reports spitting out and pocketing of food. PO intake declining, Lethargic Pt with poor intake

## 2017-06-18 NOTE — PROGRESS NOTE ADULT - ASSESSMENT
83F, pmh as above a/w:    1. Bilateral pneumonia/UTI:  -ID eval appreciated  -continue cefepime  -f/u cultures  -o2 prn    2. Hypernatremia:  -changed ivf to d5 1/2 ns.   -f/u bmp ordered for 12pm  -nephrology consulted.    3. Metabolic encephalopathy:  -due to #2+1    4. h/o Atrial fibrillation with svt on tele while in ED  -in sinus here  -continue ccb/bb  -not on a/c  -can likely dc tele tomorrow if hr controlled     5. PVD s/p left bka:  -continue plavix    6. HTN:  -continue ccb/bb    7. Uncontrolled DM:  -hgba1c >10  -continue lantus/sliding scale  -adjust based on sugars    8. Dyslipidemia:  -continue statin    9. Dementia:  -continue namenda.     10. DVT px

## 2017-06-18 NOTE — CHART NOTE - NSCHARTNOTEFT_GEN_A_CORE
As per daughter pt's overall status declining. Possible food/fluid consistency modification needed. Suggest SLP evaluation secondary to pt has episodes of pocketing and spitting food out. Please order SLP evaluation to decrease risk of malnutrition.

## 2017-06-18 NOTE — DIETITIAN INITIAL EVALUATION ADULT. - NS AS NUTRI INTERV MEDICAL AND FOOD SUPPLEMENTS3
Modified beverage/Glucerna  and Prosource/Commercial beverage Commercial beverage/Modified beverage/Glucerna 8oz.BID  and [employer] 1oz. BID

## 2017-06-18 NOTE — CONSULT NOTE ADULT - SUBJECTIVE AND OBJECTIVE BOX
Patient is a 83y old  Female who presents with a chief complaint of sob (2017 01:00)    HPI:  82 y/o Female with h/o DM Type II, PVD s/p Left  BKA, dementia, Afib, Major depression, HTN was admitted on  for lethargy, cough. She is reported with dry, nonproductive cough X 2 weeks. She is alert at baseline but not oriented. Reported with a nonbilious vomiting episode the day prior to admission, for which she received IVF in the facility . She was brought to ED today because family concerned about patient being more lethargic. In ED : given Vanco  1 gm + Cefepime 1gm.     PMH: as above  PSH: as above  Meds: per reconciliation sheet, noted below  MEDICATIONS  (STANDING):  aspirin enteric coated 81milliGRAM(s) Oral daily  diltiazem   CD 300milliGRAM(s) Oral daily  mirtazapine 15milliGRAM(s) Oral at bedtime  simvastatin 10milliGRAM(s) Oral at bedtime  clopidogrel Tablet 75milliGRAM(s) Oral daily  metoprolol 12.5milliGRAM(s) Oral two times a day  memantine 10milliGRAM(s) Oral two times a day  potassium chloride    Tablet ER 10milliEquivalent(s) Oral daily  calcium carbonate 1250 mG + Vitamin D (OsCal 500 + D) 1Tablet(s) Oral two times a day  heparin  Injectable 5000Unit(s) SubCutaneous every 8 hours  sodium chloride 0.9%. 1000milliLiter(s) IV Continuous <Continuous>  insulin glargine Injectable (LANTUS) 35Unit(s) SubCutaneous at bedtime  insulin lispro (HumaLOG) corrective regimen sliding scale  SubCutaneous three times a day before meals  dextrose 5%. 1000milliLiter(s) IV Continuous <Continuous>  dextrose 50% Injectable 12.5Gram(s) IV Push once  dextrose 50% Injectable 25Gram(s) IV Push once  dextrose 50% Injectable 25Gram(s) IV Push once  cefepime  IVPB 2000milliGRAM(s) IV Intermittent every 12 hours  docusate sodium 200milliGRAM(s) Oral daily    MEDICATIONS  (PRN):  dextrose Gel 1Dose(s) Oral once PRN Blood Glucose LESS THAN 70 milliGRAM(s)/deciliter  glucagon  Injectable 1milliGRAM(s) IntraMuscular once PRN Glucose LESS THAN 70 milligrams/deciliter    Allergies    iodine (Other (Unknown))  sulfa drugs (Other (Unknown))    Intolerances      Social: no smoking, no alcohol, no illegal drugs; no recent travel, no exposure to TB  FAMILY HISTORY:  No pertinent family history in first degree relatives    ROS: the patient is confused; unobtainable    Vital Signs Last 24 Hrs  T(C): 36.7, Max: 37.9 ( @ 16:50)  T(F): 98, Max: 100.3 ( @ 16:50)  HR: 82 (81 - 92)  BP: 144/55 (114/59 - 147/70)  BP(mean): --  RR: 16 (16 - 20)  SpO2: 98% (95% - 100%)  Daily Height in cm: 165.1 (2017 15:37)    Daily     PE:    Constitutional: frail looking  HEENT: NC/AT, EOMI, PERRLA  Neck: supple  Back: no tenderness  Respiratory: crackles at bases  Cardiovascular: S1S2 regular, no murmurs  Abdomen: soft, not tender, not distended, positive BS  Genitourinary: deferred  Rectal: deferred  Musculoskeletal: no muscle tenderness, no joint swelling or tenderness  Extremities: no pedal edema  Neurological: AxOx3, moving all extremities, no focal deficits  Skin: no rashes    Labs:                        14.1   11.1  )-----------( 156      ( 2017 17:47 )             45.2         148<H>  |  115<H>  |  16  ----------------------------<  512<HH>  3.2<L>   |  27  |  1.02    Ca    8.5      2017 21:38  Mg     2.2         TPro  6.7  /  Alb  2.7<L>  /  TBili  0.4  /  DBili  x   /  AST  14<L>  /  ALT  11<L>  /  AlkPhos  113       LIVER FUNCTIONS - ( 2017 17:47 )  Alb: 2.7 g/dL / Pro: 6.7 gm/dL / ALK PHOS: 113 U/L / ALT: 11 U/L / AST: 14 U/L / GGT: x           Urinalysis Basic - ( 2017 21:37 )    Color: Yellow / Appearance: very cloudy / S.025 / pH: x  Gluc: x / Ketone: Negative  / Bili: Negative / Urobili: Negative mg/dL   Blood: x / Protein: 30 mg/dL / Nitrite: Negative   Leuk Esterase: Moderate / RBC: 25-50 /HPF / WBC >50   Sq Epi: x / Non Sq Epi: Few / Bacteria: Moderate          Radiology:    CT chest:  Patchy airspace opacities at the lung bases which may be infectious in  etiology. Follow-up is suggested until resolution.  Indeterminate 1 cm exophytic lesion along the interpolar left kidney.   Ultrasound suggested for further evaluation.  4 mm pulmonary nodule in the right upper lobe.      Advanced directives addressed: full resuscitation

## 2017-06-18 NOTE — DIETITIAN INITIAL EVALUATION ADULT. - OTHER INFO
Nutrition Consult for enteral nutrition evaluation. Question consult secondary to family stated does not want TF. As per daughter pt's overall status declining. Possible food/fluid consistency modification needed. Suggest SLP evaluation secondary to pt has episodes of pocketing and spitting food out. Skin : Right heel stage 1, +1 edema R-foot. Daughter stated current weight baseline with no significant changes. PO intake poor consuming only ~25% of meals with total feed. Aspiration precautions in place. Diet rx appropriate consistent carbohydrate w/ evening snack (Lab: 6/18/17-HgA1c 10.9). Suggest implementing Glucerna and prosource supplement secondary to poor intake and high risk for skin B/D.

## 2017-06-19 LAB
ANION GAP SERPL CALC-SCNC: 5 MMOL/L — SIGNIFICANT CHANGE UP (ref 5–17)
BASOPHILS # BLD AUTO: 0.1 K/UL — SIGNIFICANT CHANGE UP (ref 0–0.2)
BASOPHILS NFR BLD AUTO: 0.7 % — SIGNIFICANT CHANGE UP (ref 0–2)
BUN SERPL-MCNC: 12 MG/DL — SIGNIFICANT CHANGE UP (ref 7–23)
CALCIUM SERPL-MCNC: 8.7 MG/DL — SIGNIFICANT CHANGE UP (ref 8.5–10.1)
CHLORIDE SERPL-SCNC: 124 MMOL/L — HIGH (ref 96–108)
CO2 SERPL-SCNC: 28 MMOL/L — SIGNIFICANT CHANGE UP (ref 22–31)
CREAT SERPL-MCNC: 0.79 MG/DL — SIGNIFICANT CHANGE UP (ref 0.5–1.3)
CULTURE RESULTS: SIGNIFICANT CHANGE UP
EOSINOPHIL # BLD AUTO: 0.1 K/UL — SIGNIFICANT CHANGE UP (ref 0–0.5)
EOSINOPHIL NFR BLD AUTO: 1.3 % — SIGNIFICANT CHANGE UP (ref 0–6)
GLUCOSE SERPL-MCNC: 96 MG/DL — SIGNIFICANT CHANGE UP (ref 70–99)
GRAM STN FLD: SIGNIFICANT CHANGE UP
HCT VFR BLD CALC: 39.1 % — SIGNIFICANT CHANGE UP (ref 34.5–45)
HGB BLD-MCNC: 12.2 G/DL — SIGNIFICANT CHANGE UP (ref 11.5–15.5)
LYMPHOCYTES # BLD AUTO: 3.7 K/UL — HIGH (ref 1–3.3)
LYMPHOCYTES # BLD AUTO: 39.6 % — SIGNIFICANT CHANGE UP (ref 13–44)
MAGNESIUM SERPL-MCNC: 2.1 MG/DL — SIGNIFICANT CHANGE UP (ref 1.6–2.6)
MCHC RBC-ENTMCNC: 29.5 PG — SIGNIFICANT CHANGE UP (ref 27–34)
MCHC RBC-ENTMCNC: 31.1 GM/DL — LOW (ref 32–36)
MCV RBC AUTO: 94.8 FL — SIGNIFICANT CHANGE UP (ref 80–100)
MONOCYTES # BLD AUTO: 0.7 K/UL — SIGNIFICANT CHANGE UP (ref 0–0.9)
MONOCYTES NFR BLD AUTO: 7.7 % — SIGNIFICANT CHANGE UP (ref 2–14)
NEUTROPHILS # BLD AUTO: 4.7 K/UL — SIGNIFICANT CHANGE UP (ref 1.8–7.4)
NEUTROPHILS NFR BLD AUTO: 50.7 % — SIGNIFICANT CHANGE UP (ref 43–77)
PHOSPHATE SERPL-MCNC: 2.3 MG/DL — LOW (ref 2.5–4.5)
PLATELET # BLD AUTO: 112 K/UL — LOW (ref 150–400)
POTASSIUM SERPL-MCNC: 3.4 MMOL/L — LOW (ref 3.5–5.3)
POTASSIUM SERPL-SCNC: 3.4 MMOL/L — LOW (ref 3.5–5.3)
RBC # BLD: 4.12 M/UL — SIGNIFICANT CHANGE UP (ref 3.8–5.2)
RBC # FLD: 13.9 % — SIGNIFICANT CHANGE UP (ref 10.3–14.5)
SODIUM SERPL-SCNC: 157 MMOL/L — HIGH (ref 135–145)
SPECIMEN SOURCE: SIGNIFICANT CHANGE UP
SPECIMEN SOURCE: SIGNIFICANT CHANGE UP
WBC # BLD: 9.3 K/UL — SIGNIFICANT CHANGE UP (ref 3.8–10.5)
WBC # FLD AUTO: 9.3 K/UL — SIGNIFICANT CHANGE UP (ref 3.8–10.5)

## 2017-06-19 RX ORDER — VANCOMYCIN HCL 1 G
500 VIAL (EA) INTRAVENOUS EVERY 12 HOURS
Qty: 0 | Refills: 0 | Status: DISCONTINUED | OUTPATIENT
Start: 2017-06-20 | End: 2017-06-27

## 2017-06-19 RX ORDER — VANCOMYCIN HCL 1 G
1000 VIAL (EA) INTRAVENOUS ONCE
Qty: 0 | Refills: 0 | Status: COMPLETED | OUTPATIENT
Start: 2017-06-19 | End: 2017-06-19

## 2017-06-19 RX ORDER — VANCOMYCIN HCL 1 G
500 VIAL (EA) INTRAVENOUS ONCE
Qty: 0 | Refills: 0 | Status: COMPLETED | OUTPATIENT
Start: 2017-06-19 | End: 2017-06-19

## 2017-06-19 RX ORDER — SODIUM CHLORIDE 9 MG/ML
1000 INJECTION, SOLUTION INTRAVENOUS
Qty: 0 | Refills: 0 | Status: DISCONTINUED | OUTPATIENT
Start: 2017-06-19 | End: 2017-06-20

## 2017-06-19 RX ORDER — POTASSIUM CHLORIDE 20 MEQ
20 PACKET (EA) ORAL ONCE
Qty: 0 | Refills: 0 | Status: COMPLETED | OUTPATIENT
Start: 2017-06-19 | End: 2017-06-19

## 2017-06-19 RX ORDER — INSULIN GLARGINE 100 [IU]/ML
18 INJECTION, SOLUTION SUBCUTANEOUS ONCE
Qty: 0 | Refills: 0 | Status: COMPLETED | OUTPATIENT
Start: 2017-06-19 | End: 2017-06-20

## 2017-06-19 RX ORDER — VANCOMYCIN HCL 1 G
VIAL (EA) INTRAVENOUS
Qty: 0 | Refills: 0 | Status: DISCONTINUED | OUTPATIENT
Start: 2017-06-19 | End: 2017-06-27

## 2017-06-19 RX ADMIN — Medication 2: at 17:39

## 2017-06-19 RX ADMIN — Medication 20 MILLIEQUIVALENT(S): at 17:39

## 2017-06-19 RX ADMIN — SODIUM CHLORIDE 75 MILLILITER(S): 9 INJECTION, SOLUTION INTRAVENOUS at 14:06

## 2017-06-19 RX ADMIN — MEMANTINE HYDROCHLORIDE 10 MILLIGRAM(S): 10 TABLET ORAL at 17:39

## 2017-06-19 RX ADMIN — Medication 10 MILLIEQUIVALENT(S): at 12:24

## 2017-06-19 RX ADMIN — Medication 12.5 MILLIGRAM(S): at 17:39

## 2017-06-19 RX ADMIN — CEFEPIME 100 MILLIGRAM(S): 1 INJECTION, POWDER, FOR SOLUTION INTRAMUSCULAR; INTRAVENOUS at 17:38

## 2017-06-19 RX ADMIN — Medication 81 MILLIGRAM(S): at 12:24

## 2017-06-19 RX ADMIN — Medication 1 TABLET(S): at 17:39

## 2017-06-19 RX ADMIN — SODIUM CHLORIDE 75 MILLILITER(S): 9 INJECTION, SOLUTION INTRAVENOUS at 02:32

## 2017-06-19 RX ADMIN — Medication 2: at 12:34

## 2017-06-19 RX ADMIN — CEFEPIME 100 MILLIGRAM(S): 1 INJECTION, POWDER, FOR SOLUTION INTRAMUSCULAR; INTRAVENOUS at 07:09

## 2017-06-19 RX ADMIN — HEPARIN SODIUM 5000 UNIT(S): 5000 INJECTION INTRAVENOUS; SUBCUTANEOUS at 07:08

## 2017-06-19 RX ADMIN — Medication 250 MILLIGRAM(S): at 04:12

## 2017-06-19 RX ADMIN — Medication 100 MILLIGRAM(S): at 14:38

## 2017-06-19 RX ADMIN — CLOPIDOGREL BISULFATE 75 MILLIGRAM(S): 75 TABLET, FILM COATED ORAL at 12:24

## 2017-06-19 RX ADMIN — HEPARIN SODIUM 5000 UNIT(S): 5000 INJECTION INTRAVENOUS; SUBCUTANEOUS at 14:40

## 2017-06-19 RX ADMIN — SODIUM CHLORIDE 75 MILLILITER(S): 9 INJECTION, SOLUTION INTRAVENOUS at 15:15

## 2017-06-19 NOTE — CDI QUERY NOTE - NSCDIOTHERTXTBX_GEN_ALL_CORE_HH
Per documentation patient with multifocal pneumonia  On H+P:  HCAP suspected GNR patient from SNF.  Patient being treated with IV cefepime q12h.  Also noted: Reported with a nonbilious vomiting episode the day prior to admission, for which she received IVF in the facility .    Please clarify the Suspected / Probable type of pneumonia that is being treated.  ie.. HCAP suspected GNR ?  .... Suspected Aspiration pneumonia ?  .... Other suspected type of pneumonia ? Please clarify

## 2017-06-19 NOTE — SWALLOW BEDSIDE ASSESSMENT ADULT - ASR SWALLOW LABIAL MOBILITY
Limited probes were notable for reduced effort/agility when executing reflexive labial actions without an overt lip focality.

## 2017-06-19 NOTE — SWALLOW BEDSIDE ASSESSMENT ADULT - ORAL PREPARATORY PHASE
Pt's willingness to accept Po was psychogenically reduced at times and resistive  mouth closing was often noted on PO presentation. When she did accept PO oral aperture was purposefully decreased but labial grading on utensils was functional. Pt's willingness to accept PO was psychogenically reduced at times and resistive mouth closing was often noted on PO presentation. When she did accept PO,  oral aperture was purposefully decreased but labial grading on utensils was functional.

## 2017-06-19 NOTE — SWALLOW BEDSIDE ASSESSMENT ADULT - COMMENTS
Pt admitted from [facility] with lethargy and cough. Hosp course notable for metabolic encephalopathy, bilateral pneumonia, UTI, hypernatremia, and uncontrolled DM. This profile is superimposed upon a h/o DM as previously stated, PVD s/p left BKA, CAD s/p CABG, A-Fib, HTN, HLD, GERD, advanced dementia and major depression.

## 2017-06-19 NOTE — SWALLOW BEDSIDE ASSESSMENT ADULT - ORAL PHASE
Bolus formation/transfer with mech soft solids was characterized by moderately to excessively prolonged much masticatory motions. Bolus formation/transfer with pureed foods and liquids were achieved via mildly to moderately prolonged/discontinuous but grossly functional lingual pumping actions. Piecemeal deglutition was evident. Scattered tongue debris noted with mech soft solids only. Bolus formation/transfer with mech soft solids was characterized by moderately to excessively prolonged munch masticatory motions. Bolus formation/transfer with pureed foods and liquids were achieved via mildly to moderately prolonged/discontinuous but grossly functional lingual pumping actions. Piecemeal deglutition was evident. Scattered tongue debris noted with mech soft solids only.

## 2017-06-19 NOTE — SWALLOW BEDSIDE ASSESSMENT ADULT - ASR SWALLOW DENTITION
Notable for maxillary denture placement and presence of natural mandibular teeth with missing posterior dentition.

## 2017-06-19 NOTE — SWALLOW BEDSIDE ASSESSMENT ADULT - SWALLOW EVAL: DIAGNOSIS
1) Pt demonstrates periodic psychogenically reduced willingness to accept PO atop Oral Dysphagia which subjectively appeared to be a grossly functional condition for a restricted inventory of modified food textures, Oral Dysphagia is in setting of encephalopathy from acute illness(i.e PNA, UTI, etc), many missing teeth, and underlying dementia. Severity of Oral Dysphagia is apt to fluctuate with changes in alertness/mood/mentation. Underlying pharyngeal integrity subjectively appeared to be grossly within functional parameters for age. No behavioral aspiration signs exhibited on exam. Her profile places her at nutrition risk.   2) Pt was passive and internally distractible when engaged. She was non verbal and did not follow commands. She exhibited prominent Cognitive Dysfunction due to dementia that is likely heightened by metabolic encephalopathy associated with acute illness.

## 2017-06-19 NOTE — SWALLOW BEDSIDE ASSESSMENT ADULT - SWALLOW EVAL: SECRETION MANAGEMENT
Unable to assess due to altered cognition though it is noted that her nonnutritive cough was produced with sufficient strength.

## 2017-06-19 NOTE — PROGRESS NOTE ADULT - ASSESSMENT
82 y/o Female with h/o DM Type II, PVD s/p Left  BKA, dementia, Afib, Major depression, HTN was admitted on 6/17 for lethargy, cough. She is reported with dry, nonproductive cough X 2 weeks. She is alert at baseline but not oriented. Reported with a nonbilious vomiting episode the day prior to admission, for which she received IVF in the facility . She was brought to ED today because family concerned about patient being more lethargic.    1. Low grade fever resolving. Sepsis with GPC in clusers. ?cause. Multifocal pneumonia. Pyuria. Likely UTI. CRF stage 3.  --bacteremia in 2.4 blood culture bottles; contaminant is less likely  -obtain repeat BC x 2  -on cefepime 1 gm IV q12h # 2  -add vancomycin 500 mg IV q12h  -monitor BMP, vancomycin trough level  -continue abx coverage  -aspiration precautions  -monitor temps  -f/u CBC  -supportive care  2. Other issues: DM Type II, PVD s/p Left  BKA, dementia, Afib, Major depression, HTN   -care per medicine

## 2017-06-19 NOTE — SWALLOW BEDSIDE ASSESSMENT ADULT - SLP GENERAL OBSERVATIONS
Pt was awake. Her affect was flat. Eye gaze was distant at times. Pt was communicatively passive and internally distractible. She could not be directed to structured communication tasks, and she did not follow commands or attempt to verbalize despite cues. Prominent chronic Cognitive Dysfunction from dementia is evident which is likely heightened by encephalopathy features associated with acute illness. Her needs must be anticipated.

## 2017-06-19 NOTE — SWALLOW BEDSIDE ASSESSMENT ADULT - SWALLOW EVAL: RECOMMENDED DIET
Suggest a Puree Texture Diet with Thin Liquid Consistencies as this best accommodates her swallow profile at this time.

## 2017-06-19 NOTE — SWALLOW BEDSIDE ASSESSMENT ADULT - PHARYNGEAL PHASE
Swallow triggered in an acceptable time frame for age. Laryngeal lift on palpation during swallow trials was functional and within age acceptable parameters.  No behavioral aspiration signs exhibited on exam. She did not appear to be in any overt pain during swallow trials.

## 2017-06-19 NOTE — CONSULT NOTE ADULT - SUBJECTIVE AND OBJECTIVE BOX
Patient is a 83y Female whom presented to the hospital with  h/o DM Type II, PVD s/p Left  BKA, dementia, Afib, Major depression, HTN was admitted on  for lethargy, cough. She is reported with dry, nonproductive cough X 2 weeks. She is alert at baseline but not oriented. Patient here with increasing lethargy, renal evaluation of hypernatremia. Hx obtained from the chart, patient unable to provide. Per staff poor intake, some improvement this AM.    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      MEDICATIONS  (STANDING):  aspirin enteric coated 81milliGRAM(s) Oral daily  diltiazem   CD 300milliGRAM(s) Oral daily  mirtazapine 15milliGRAM(s) Oral at bedtime  simvastatin 10milliGRAM(s) Oral at bedtime  clopidogrel Tablet 75milliGRAM(s) Oral daily  metoprolol 12.5milliGRAM(s) Oral two times a day  memantine 10milliGRAM(s) Oral two times a day  potassium chloride    Tablet ER 10milliEquivalent(s) Oral daily  calcium carbonate 1250 mG + Vitamin D (OsCal 500 + D) 1Tablet(s) Oral two times a day  heparin  Injectable 5000Unit(s) SubCutaneous every 8 hours  insulin glargine Injectable (LANTUS) 35Unit(s) SubCutaneous at bedtime  insulin lispro (HumaLOG) corrective regimen sliding scale  SubCutaneous three times a day before meals  dextrose 5%. 1000milliLiter(s) IV Continuous <Continuous>  dextrose 50% Injectable 12.5Gram(s) IV Push once  dextrose 50% Injectable 25Gram(s) IV Push once  dextrose 50% Injectable 25Gram(s) IV Push once  docusate sodium 200milliGRAM(s) Oral daily  cefepime  IVPB 1000milliGRAM(s) IV Intermittent every 12 hours    MEDICATIONS  (PRN):  dextrose Gel 1Dose(s) Oral once PRN Blood Glucose LESS THAN 70 milliGRAM(s)/deciliter  glucagon  Injectable 1milliGRAM(s) IntraMuscular once PRN Glucose LESS THAN 70 milligrams/deciliter      Allergies    iodine (Other (Unknown))  sulfa drugs (Other (Unknown))    Intolerances        SOCIAL HISTORY:  no ative cigg or etoh known    FAMILY HISTORY:  No pertinent family history in first degree relatives or renal disease      REVIEW OF SYSTEMS:    UTO secondary to dementia    T(C): , Max: 37.1 ( @ 16:00)  T(F): , Max: 98.8 ( @ 16:00)  HR: 70  BP: 120/89  BP(mean): --  RR: --  SpO2: 100%  Wt(kg): --    I & Os for current day (as of  @ 10:44)  =============================================  IN: 950 ml / OUT: 0 ml / NET: 950 ml        PHYSICAL EXAM:    Constitutional: NAD, frail/cachectic  HEENT: PERRLA, EOMI,  MMM  Neck: No LAD, No JVD  Respiratory: dist bs  Cardiovascular: S1 and S2, RRR  Gastrointestinal: BS+, soft, NT/ND  Extremities: No peripheral edema  Neurological: Alert, does not follow command  : No Lopez  Skin: No rashes  Access: Not applicable        LABS:                        12.2   9.3   )-----------( 112      ( 2017 06:42 )             39.1     2017 06:42    157    |  124    |  12     ----------------------------<  96     3.4     |  28     |  0.79   2017 19:35    160    |  127    |  16     ----------------------------<  99     3.4     |  28     |  0.73   2017 12:18    159    |  128    |  15     ----------------------------<  118    3.4     |  30     |  0.68   2017 06:12    162    |  127    |  16     ----------------------------<  148    3.6     |  32     |  0.81   2017 21:38    148    |  115    |  16     ----------------------------<  512    3.2     |  27     |  1.02     Ca    8.7        2017 06:42  Ca    8.8        2017 19:35  Ca    9.0        2017 12:18  Ca    8.8        2017 06:12  Ca    8.5        2017 21:38  Phos  2.3       2017 06:42  Mg     2.1       2017 06:42  Mg     2.2       2017 17:47    TPro  6.7    /  Alb  2.7    /  TBili  0.4    /  DBili  x      /  AST  14     /  ALT  11     /  AlkPhos  113    2017 17:47          Urine Studies:  Urinalysis Basic - ( 2017 21:37 )    Color: Yellow / Appearance: very cloudy / S.025 / pH: x  Gluc: x / Ketone: Negative  / Bili: Negative / Urobili: Negative mg/dL   Blood: x / Protein: 30 mg/dL / Nitrite: Negative   Leuk Esterase: Moderate / RBC: 25-50 /HPF / WBC >50   Sq Epi: x / Non Sq Epi: Few / Bacteria: Moderate            RADIOLOGY & ADDITIONAL STUDIES:

## 2017-06-19 NOTE — SWALLOW BEDSIDE ASSESSMENT ADULT - ASR SWALLOW RECOMMEND DIAG
Defer MBS as pt appears clinically tolerant of suggested PO textures and considering her altered mentation/compliance. Defer MBS as pt appears clinically tolerant of suggested PO textures from an oropharyngeal swallowing stance and considering her altered mentation/compliance.

## 2017-06-19 NOTE — SWALLOW BEDSIDE ASSESSMENT ADULT - ASR SWALLOW LINGUAL MOBILITY
Limited probes revealed that effort/agility were reduced when executing reflexive lingual actions without an overt tongue focality.

## 2017-06-19 NOTE — CONSULT NOTE ADULT - ASSESSMENT
83y Female whom presented to the hospital with  h/o DM Type II, PVD s/p Left  BKA, dementia, Afib, Major depression, HTN was admitted on 6/17 for lethargy, cough with renal evaluation of hypernatremia.     Hypernatremia  -Change IVF to d5w based only  -D5W with 20 Kcl at 75/hr  -Optimize osmolar intake  -Optimize free water intake  -Not on lithium, unclear history to suspect DI. Will check urine studies if able to obtain    Hypokalemia  -Add kcl to IVF  -Gets K po standing    If chronic FTT, consider palliative

## 2017-06-20 LAB
-  AMPICILLIN/SULBACTAM: SIGNIFICANT CHANGE UP
-  CEFAZOLIN: SIGNIFICANT CHANGE UP
-  CIPROFLOXACIN: SIGNIFICANT CHANGE UP
-  CLINDAMYCIN: SIGNIFICANT CHANGE UP
-  ERYTHROMYCIN: SIGNIFICANT CHANGE UP
-  GENTAMICIN: SIGNIFICANT CHANGE UP
-  LEVOFLOXACIN: SIGNIFICANT CHANGE UP
-  MOXIFLOXACIN(AEROBIC): SIGNIFICANT CHANGE UP
-  OXACILLIN: SIGNIFICANT CHANGE UP
-  PENICILLIN: SIGNIFICANT CHANGE UP
-  RIFAMPIN: SIGNIFICANT CHANGE UP
-  TETRACYCLINE: SIGNIFICANT CHANGE UP
-  TRIMETHOPRIM/SULFAMETHOXAZOLE: SIGNIFICANT CHANGE UP
-  VANCOMYCIN: SIGNIFICANT CHANGE UP
ANION GAP SERPL CALC-SCNC: 6 MMOL/L — SIGNIFICANT CHANGE UP (ref 5–17)
BUN SERPL-MCNC: 16 MG/DL — SIGNIFICANT CHANGE UP (ref 7–23)
CALCIUM SERPL-MCNC: 8.7 MG/DL — SIGNIFICANT CHANGE UP (ref 8.5–10.1)
CHLORIDE SERPL-SCNC: 117 MMOL/L — HIGH (ref 96–108)
CHLORIDE UR-SCNC: 43 MMOL/L — SIGNIFICANT CHANGE UP
CO2 SERPL-SCNC: 27 MMOL/L — SIGNIFICANT CHANGE UP (ref 22–31)
CREAT ?TM UR-MCNC: 19 MG/DL — SIGNIFICANT CHANGE UP
CREAT SERPL-MCNC: 0.9 MG/DL — SIGNIFICANT CHANGE UP (ref 0.5–1.3)
CULTURE RESULTS: SIGNIFICANT CHANGE UP
GLUCOSE SERPL-MCNC: 224 MG/DL — HIGH (ref 70–99)
METHOD TYPE: SIGNIFICANT CHANGE UP
ORGANISM # SPEC MICROSCOPIC CNT: SIGNIFICANT CHANGE UP
ORGANISM # SPEC MICROSCOPIC CNT: SIGNIFICANT CHANGE UP
POTASSIUM SERPL-MCNC: 4.1 MMOL/L — SIGNIFICANT CHANGE UP (ref 3.5–5.3)
POTASSIUM SERPL-SCNC: 4.1 MMOL/L — SIGNIFICANT CHANGE UP (ref 3.5–5.3)
SODIUM SERPL-SCNC: 150 MMOL/L — HIGH (ref 135–145)
SODIUM UR-SCNC: 39 MMOL/L — SIGNIFICANT CHANGE UP
SPECIMEN SOURCE: SIGNIFICANT CHANGE UP

## 2017-06-20 RX ORDER — SODIUM CHLORIDE 9 MG/ML
1000 INJECTION, SOLUTION INTRAVENOUS
Qty: 0 | Refills: 0 | Status: DISCONTINUED | OUTPATIENT
Start: 2017-06-20 | End: 2017-06-21

## 2017-06-20 RX ADMIN — Medication 2: at 09:32

## 2017-06-20 RX ADMIN — INSULIN GLARGINE 18 UNIT(S): 100 INJECTION, SOLUTION SUBCUTANEOUS at 00:23

## 2017-06-20 RX ADMIN — Medication 100 MILLIGRAM(S): at 06:47

## 2017-06-20 RX ADMIN — INSULIN GLARGINE 35 UNIT(S): 100 INJECTION, SOLUTION SUBCUTANEOUS at 23:02

## 2017-06-20 RX ADMIN — MIRTAZAPINE 15 MILLIGRAM(S): 45 TABLET, ORALLY DISINTEGRATING ORAL at 23:01

## 2017-06-20 RX ADMIN — Medication 81 MILLIGRAM(S): at 14:23

## 2017-06-20 RX ADMIN — Medication 1 TABLET(S): at 18:26

## 2017-06-20 RX ADMIN — Medication 10 MILLIEQUIVALENT(S): at 14:08

## 2017-06-20 RX ADMIN — HEPARIN SODIUM 5000 UNIT(S): 5000 INJECTION INTRAVENOUS; SUBCUTANEOUS at 23:01

## 2017-06-20 RX ADMIN — Medication 100 MILLIGRAM(S): at 18:35

## 2017-06-20 RX ADMIN — SIMVASTATIN 10 MILLIGRAM(S): 20 TABLET, FILM COATED ORAL at 23:01

## 2017-06-20 RX ADMIN — CLOPIDOGREL BISULFATE 75 MILLIGRAM(S): 75 TABLET, FILM COATED ORAL at 12:51

## 2017-06-20 RX ADMIN — MEMANTINE HYDROCHLORIDE 10 MILLIGRAM(S): 10 TABLET ORAL at 18:27

## 2017-06-20 RX ADMIN — HEPARIN SODIUM 5000 UNIT(S): 5000 INJECTION INTRAVENOUS; SUBCUTANEOUS at 06:03

## 2017-06-20 RX ADMIN — MEMANTINE HYDROCHLORIDE 10 MILLIGRAM(S): 10 TABLET ORAL at 06:03

## 2017-06-20 RX ADMIN — CEFEPIME 100 MILLIGRAM(S): 1 INJECTION, POWDER, FOR SOLUTION INTRAMUSCULAR; INTRAVENOUS at 06:03

## 2017-06-20 RX ADMIN — Medication 12.5 MILLIGRAM(S): at 18:26

## 2017-06-20 RX ADMIN — SODIUM CHLORIDE 75 MILLILITER(S): 9 INJECTION, SOLUTION INTRAVENOUS at 09:24

## 2017-06-20 RX ADMIN — Medication 1 TABLET(S): at 06:03

## 2017-06-20 RX ADMIN — SODIUM CHLORIDE 50 MILLILITER(S): 9 INJECTION, SOLUTION INTRAVENOUS at 15:19

## 2017-06-20 RX ADMIN — HEPARIN SODIUM 5000 UNIT(S): 5000 INJECTION INTRAVENOUS; SUBCUTANEOUS at 00:20

## 2017-06-20 RX ADMIN — SIMVASTATIN 10 MILLIGRAM(S): 20 TABLET, FILM COATED ORAL at 00:21

## 2017-06-20 RX ADMIN — CEFEPIME 100 MILLIGRAM(S): 1 INJECTION, POWDER, FOR SOLUTION INTRAMUSCULAR; INTRAVENOUS at 19:58

## 2017-06-20 RX ADMIN — HEPARIN SODIUM 5000 UNIT(S): 5000 INJECTION INTRAVENOUS; SUBCUTANEOUS at 14:23

## 2017-06-20 RX ADMIN — Medication 12.5 MILLIGRAM(S): at 06:03

## 2017-06-20 NOTE — PROGRESS NOTE ADULT - ASSESSMENT
84 y/o Female with h/o DM Type II, PVD s/p Left  BKA, dementia, Afib, Major depression, HTN was admitted on 6/17 for lethargy, cough. She is reported with dry, nonproductive cough X 2 weeks. She is alert at baseline but not oriented. Reported with a nonbilious vomiting episode the day prior to admission, for which she received IVF in the facility . She was brought to ED today because family concerned about patient being more lethargic.    1.  Sepsis with CNST. ?cause. ?contaminant vs true sepsis. Multifocal pneumonia. Pyuria. Likely UTI. CRF stage 3.  -fever is down  -obtain repeat BC x 2  -on cefepime 1 gm IV q12h # 3 and vancomycin 500 mg IV q12h # 2  -obtain vancomycin trough level  -continue abx coverage  -aspiration precautions  -monitor temps  -f/u CBC  -supportive care  2. Other issues: DM Type II, PVD s/p Left  BKA, dementia, Afib, Major depression, HTN   -care per medicine

## 2017-06-20 NOTE — PROGRESS NOTE ADULT - ASSESSMENT
83y Female whom presented to the hospital with  h/o DM Type II, PVD s/p Left  BKA, dementia, Afib, Major depression, HTN was admitted on 6/17 for lethargy, cough with renal evaluation of hypernatremia.     Hypernatremia  -Maintain hypotonic IVF  -D5W with 20 Kcl   -Optimize osmolar intake  -Optimize free water intake  -Not on lithium, unclear history to suspect DI. Will check urine studies if able to obtain    Hypokalemia  - kcl w IVF  -Gets K po standing    If chronic FTT, consider palliative

## 2017-06-21 LAB
ANION GAP SERPL CALC-SCNC: 5 MMOL/L — SIGNIFICANT CHANGE UP (ref 5–17)
ANION GAP SERPL CALC-SCNC: 6 MMOL/L — SIGNIFICANT CHANGE UP (ref 5–17)
BUN SERPL-MCNC: 11 MG/DL — SIGNIFICANT CHANGE UP (ref 7–23)
BUN SERPL-MCNC: 9 MG/DL — SIGNIFICANT CHANGE UP (ref 7–23)
CALCIUM SERPL-MCNC: 8.8 MG/DL — SIGNIFICANT CHANGE UP (ref 8.5–10.1)
CALCIUM SERPL-MCNC: 9 MG/DL — SIGNIFICANT CHANGE UP (ref 8.5–10.1)
CHLORIDE SERPL-SCNC: 114 MMOL/L — HIGH (ref 96–108)
CHLORIDE SERPL-SCNC: 119 MMOL/L — HIGH (ref 96–108)
CO2 SERPL-SCNC: 28 MMOL/L — SIGNIFICANT CHANGE UP (ref 22–31)
CO2 SERPL-SCNC: 31 MMOL/L — SIGNIFICANT CHANGE UP (ref 22–31)
CREAT SERPL-MCNC: 0.69 MG/DL — SIGNIFICANT CHANGE UP (ref 0.5–1.3)
CREAT SERPL-MCNC: 0.74 MG/DL — SIGNIFICANT CHANGE UP (ref 0.5–1.3)
CULTURE RESULTS: SIGNIFICANT CHANGE UP
GLUCOSE SERPL-MCNC: 127 MG/DL — HIGH (ref 70–99)
GLUCOSE SERPL-MCNC: 94 MG/DL — SIGNIFICANT CHANGE UP (ref 70–99)
OSMOLALITY UR: 194 MOS/KG — SIGNIFICANT CHANGE UP (ref 50–1200)
POTASSIUM SERPL-MCNC: 3.7 MMOL/L — SIGNIFICANT CHANGE UP (ref 3.5–5.3)
POTASSIUM SERPL-MCNC: 4 MMOL/L — SIGNIFICANT CHANGE UP (ref 3.5–5.3)
POTASSIUM SERPL-SCNC: 3.7 MMOL/L — SIGNIFICANT CHANGE UP (ref 3.5–5.3)
POTASSIUM SERPL-SCNC: 4 MMOL/L — SIGNIFICANT CHANGE UP (ref 3.5–5.3)
SODIUM SERPL-SCNC: 150 MMOL/L — HIGH (ref 135–145)
SODIUM SERPL-SCNC: 153 MMOL/L — HIGH (ref 135–145)
SPECIMEN SOURCE: SIGNIFICANT CHANGE UP
VANCOMYCIN TROUGH SERPL-MCNC: 14.5 UG/ML — SIGNIFICANT CHANGE UP (ref 10–20)
VANCOMYCIN TROUGH SERPL-MCNC: 18.9 UG/ML — SIGNIFICANT CHANGE UP (ref 10–20)

## 2017-06-21 PROCEDURE — 93306 TTE W/DOPPLER COMPLETE: CPT | Mod: 26

## 2017-06-21 RX ORDER — DESMOPRESSIN ACETATE 0.1 MG/1
1 TABLET ORAL AT BEDTIME
Qty: 0 | Refills: 0 | Status: DISCONTINUED | OUTPATIENT
Start: 2017-06-21 | End: 2017-06-27

## 2017-06-21 RX ORDER — ASPIRIN/CALCIUM CARB/MAGNESIUM 324 MG
81 TABLET ORAL DAILY
Qty: 0 | Refills: 0 | Status: DISCONTINUED | OUTPATIENT
Start: 2017-06-21 | End: 2017-06-27

## 2017-06-21 RX ORDER — INSULIN GLARGINE 100 [IU]/ML
20 INJECTION, SOLUTION SUBCUTANEOUS AT BEDTIME
Qty: 0 | Refills: 0 | Status: DISCONTINUED | OUTPATIENT
Start: 2017-06-21 | End: 2017-06-27

## 2017-06-21 RX ORDER — POTASSIUM CHLORIDE 20 MEQ
10 PACKET (EA) ORAL DAILY
Qty: 0 | Refills: 0 | Status: DISCONTINUED | OUTPATIENT
Start: 2017-06-21 | End: 2017-06-21

## 2017-06-21 RX ADMIN — MIRTAZAPINE 15 MILLIGRAM(S): 45 TABLET, ORALLY DISINTEGRATING ORAL at 23:03

## 2017-06-21 RX ADMIN — Medication 100 MILLIGRAM(S): at 06:36

## 2017-06-21 RX ADMIN — MEMANTINE HYDROCHLORIDE 10 MILLIGRAM(S): 10 TABLET ORAL at 18:03

## 2017-06-21 RX ADMIN — Medication 1 TABLET(S): at 05:14

## 2017-06-21 RX ADMIN — Medication 100 MILLIGRAM(S): at 19:31

## 2017-06-21 RX ADMIN — HEPARIN SODIUM 5000 UNIT(S): 5000 INJECTION INTRAVENOUS; SUBCUTANEOUS at 05:13

## 2017-06-21 RX ADMIN — Medication 1 TABLET(S): at 18:03

## 2017-06-21 RX ADMIN — CEFEPIME 100 MILLIGRAM(S): 1 INJECTION, POWDER, FOR SOLUTION INTRAMUSCULAR; INTRAVENOUS at 05:13

## 2017-06-21 RX ADMIN — MEMANTINE HYDROCHLORIDE 10 MILLIGRAM(S): 10 TABLET ORAL at 05:14

## 2017-06-21 RX ADMIN — Medication 200 MILLIGRAM(S): at 14:29

## 2017-06-21 RX ADMIN — HEPARIN SODIUM 5000 UNIT(S): 5000 INJECTION INTRAVENOUS; SUBCUTANEOUS at 23:02

## 2017-06-21 RX ADMIN — CLOPIDOGREL BISULFATE 75 MILLIGRAM(S): 75 TABLET, FILM COATED ORAL at 14:31

## 2017-06-21 RX ADMIN — Medication 12.5 MILLIGRAM(S): at 18:08

## 2017-06-21 RX ADMIN — DESMOPRESSIN ACETATE 1 MICROGRAM(S): 0.1 TABLET ORAL at 23:02

## 2017-06-21 RX ADMIN — INSULIN GLARGINE 20 UNIT(S): 100 INJECTION, SOLUTION SUBCUTANEOUS at 23:03

## 2017-06-21 RX ADMIN — SODIUM CHLORIDE 50 MILLILITER(S): 9 INJECTION, SOLUTION INTRAVENOUS at 02:45

## 2017-06-21 RX ADMIN — CEFEPIME 100 MILLIGRAM(S): 1 INJECTION, POWDER, FOR SOLUTION INTRAMUSCULAR; INTRAVENOUS at 18:02

## 2017-06-21 RX ADMIN — SIMVASTATIN 10 MILLIGRAM(S): 20 TABLET, FILM COATED ORAL at 23:03

## 2017-06-21 RX ADMIN — HEPARIN SODIUM 5000 UNIT(S): 5000 INJECTION INTRAVENOUS; SUBCUTANEOUS at 14:30

## 2017-06-21 RX ADMIN — Medication 12.5 MILLIGRAM(S): at 05:13

## 2017-06-21 RX ADMIN — Medication 81 MILLIGRAM(S): at 14:28

## 2017-06-21 NOTE — PROGRESS NOTE ADULT - ASSESSMENT
84 y/o Female with h/o DM Type II, PVD s/p Left  BKA, dementia, Afib, Major depression, HTN was admitted on 6/17 for lethargy, cough. She is reported with dry, nonproductive cough X 2 weeks. She is alert at baseline but not oriented. Reported with a nonbilious vomiting episode the day prior to admission, for which she received IVF in the facility . She was brought to ED today because family concerned about patient being more lethargic.    1.  Low grade fever. Sepsis with CNST. ?cause. ?contaminant vs true sepsis. Multifocal pneumonia. Pyuria. Likely UTI. CRF stage 3.  -obtain repeat BC x 2  -on cefepime 1 gm IV q12h # 4 and vancomycin 500 mg IV q12h # 3  -obtain vancomycin trough level  -f/u CBC, BMP  -cardiac echo  -continue abx coverage  -aspiration precautions  -monitor temps  -f/u CBC  -supportive care  2. Other issues: DM Type II, PVD s/p Left  BKA, dementia, Afib, Major depression, HTN   -care per medicine

## 2017-06-21 NOTE — PROGRESS NOTE ADULT - ASSESSMENT
83y Female whom presented to the hospital with  h/o DM Type II, PVD s/p Left  BKA, dementia, Afib, Major depression, HTN was admitted on 6/17 for lethargy, cough with renal evaluation of hypernatremia.     Hypernatremia  -Did have relatively dilute urine (serum osm 194) on 6/19 when serum na was 157  -can trial DDAVP, may have some degree chronic DI  -DDAVP 10 mcg intranasal qhs  -Stop IVF with start DDAVP  -Optimize osmolar intake  -Not on lithium, unclear history to see etiology of DI    Hypokalemia  -Gets K po standing    If chronic FTT, consider palliative 83y Female whom presented to the hospital with  h/o DM Type II, PVD s/p Left  BKA, dementia, Afib, Major depression, HTN was admitted on 6/17 for lethargy, cough with renal evaluation of hypernatremia.     Hypernatremia  -Did have relatively dilute urine (serum osm 194) on 6/19 when serum na was 157  -can trial DDAVP, may have some degree chronic DI  -DDAVP 10 mcg intranasal qhs (unable our pharmacy), will give 0.1 qhs tonight  -Stop IVF with start DDAVP  -Optimize osmolar intake  -Not on lithium, unclear history to see etiology of DI    Hypokalemia  -Gets K po standing    d/c with Dr. Amaya  d/c with pharmacy 83y Female whom presented to the hospital with  h/o DM Type II, PVD s/p Left  BKA, dementia, Afib, Major depression, HTN was admitted on 6/17 for lethargy, cough with renal evaluation of hypernatremia.     Hypernatremia  -Did have relatively dilute urine (serum osm 194) on 6/19 when serum na was 157  -can trial DDAVP, may have some degree chronic DI  -DDAVP 10 mcg intranasal qhs (unable our pharmacy), will give 1mcg sq qhs tonight  -Stop IVF with start DDAVP  -Optimize osmolar intake  -Not on lithium, unclear history to see etiology of DI    Hypokalemia  -Gets K po standing    d/c with Dr. Amaya  d/c with pharmacy

## 2017-06-22 LAB
ANION GAP SERPL CALC-SCNC: 5 MMOL/L — SIGNIFICANT CHANGE UP (ref 5–17)
BUN SERPL-MCNC: 12 MG/DL — SIGNIFICANT CHANGE UP (ref 7–23)
CALCIUM SERPL-MCNC: 8.9 MG/DL — SIGNIFICANT CHANGE UP (ref 8.5–10.1)
CHLORIDE SERPL-SCNC: 113 MMOL/L — HIGH (ref 96–108)
CO2 SERPL-SCNC: 27 MMOL/L — SIGNIFICANT CHANGE UP (ref 22–31)
CREAT SERPL-MCNC: 0.75 MG/DL — SIGNIFICANT CHANGE UP (ref 0.5–1.3)
GLUCOSE SERPL-MCNC: 178 MG/DL — HIGH (ref 70–99)
HCT VFR BLD CALC: 39.7 % — SIGNIFICANT CHANGE UP (ref 34.5–45)
HGB BLD-MCNC: 12.4 G/DL — SIGNIFICANT CHANGE UP (ref 11.5–15.5)
MCHC RBC-ENTMCNC: 29 PG — SIGNIFICANT CHANGE UP (ref 27–34)
MCHC RBC-ENTMCNC: 31.1 GM/DL — LOW (ref 32–36)
MCV RBC AUTO: 93.2 FL — SIGNIFICANT CHANGE UP (ref 80–100)
PLATELET # BLD AUTO: 130 K/UL — LOW (ref 150–400)
POTASSIUM SERPL-MCNC: 4 MMOL/L — SIGNIFICANT CHANGE UP (ref 3.5–5.3)
POTASSIUM SERPL-SCNC: 4 MMOL/L — SIGNIFICANT CHANGE UP (ref 3.5–5.3)
RBC # BLD: 4.26 M/UL — SIGNIFICANT CHANGE UP (ref 3.8–5.2)
RBC # FLD: 13.8 % — SIGNIFICANT CHANGE UP (ref 10.3–14.5)
SODIUM SERPL-SCNC: 145 MMOL/L — SIGNIFICANT CHANGE UP (ref 135–145)
SODIUM SERPL-SCNC: 145 MMOL/L — SIGNIFICANT CHANGE UP (ref 135–145)
WBC # BLD: 6.8 K/UL — SIGNIFICANT CHANGE UP (ref 3.8–10.5)
WBC # FLD AUTO: 6.8 K/UL — SIGNIFICANT CHANGE UP (ref 3.8–10.5)

## 2017-06-22 PROCEDURE — 93971 EXTREMITY STUDY: CPT | Mod: 26,LT

## 2017-06-22 RX ORDER — HEPARIN SODIUM 5000 [USP'U]/ML
5000 INJECTION INTRAVENOUS; SUBCUTANEOUS EVERY 12 HOURS
Qty: 0 | Refills: 0 | Status: DISCONTINUED | OUTPATIENT
Start: 2017-06-22 | End: 2017-06-27

## 2017-06-22 RX ADMIN — HEPARIN SODIUM 5000 UNIT(S): 5000 INJECTION INTRAVENOUS; SUBCUTANEOUS at 06:26

## 2017-06-22 RX ADMIN — Medication 81 MILLIGRAM(S): at 13:52

## 2017-06-22 RX ADMIN — Medication 2: at 11:40

## 2017-06-22 RX ADMIN — CEFEPIME 100 MILLIGRAM(S): 1 INJECTION, POWDER, FOR SOLUTION INTRAMUSCULAR; INTRAVENOUS at 17:27

## 2017-06-22 RX ADMIN — CEFEPIME 100 MILLIGRAM(S): 1 INJECTION, POWDER, FOR SOLUTION INTRAMUSCULAR; INTRAVENOUS at 06:26

## 2017-06-22 RX ADMIN — MIRTAZAPINE 15 MILLIGRAM(S): 45 TABLET, ORALLY DISINTEGRATING ORAL at 21:43

## 2017-06-22 RX ADMIN — Medication 1 TABLET(S): at 06:26

## 2017-06-22 RX ADMIN — Medication 2: at 09:40

## 2017-06-22 RX ADMIN — SIMVASTATIN 10 MILLIGRAM(S): 20 TABLET, FILM COATED ORAL at 21:43

## 2017-06-22 RX ADMIN — Medication 100 MILLIGRAM(S): at 07:01

## 2017-06-22 RX ADMIN — DESMOPRESSIN ACETATE 1 MICROGRAM(S): 0.1 TABLET ORAL at 21:44

## 2017-06-22 RX ADMIN — INSULIN GLARGINE 20 UNIT(S): 100 INJECTION, SOLUTION SUBCUTANEOUS at 21:43

## 2017-06-22 RX ADMIN — Medication 12.5 MILLIGRAM(S): at 17:27

## 2017-06-22 RX ADMIN — Medication 100 MILLIGRAM(S): at 17:42

## 2017-06-22 RX ADMIN — MEMANTINE HYDROCHLORIDE 10 MILLIGRAM(S): 10 TABLET ORAL at 06:26

## 2017-06-22 RX ADMIN — HEPARIN SODIUM 5000 UNIT(S): 5000 INJECTION INTRAVENOUS; SUBCUTANEOUS at 17:27

## 2017-06-22 RX ADMIN — MEMANTINE HYDROCHLORIDE 10 MILLIGRAM(S): 10 TABLET ORAL at 17:27

## 2017-06-22 RX ADMIN — Medication 2: at 17:42

## 2017-06-22 RX ADMIN — Medication 100 MILLIGRAM(S): at 13:53

## 2017-06-22 RX ADMIN — Medication 1 TABLET(S): at 17:27

## 2017-06-22 RX ADMIN — Medication 12.5 MILLIGRAM(S): at 06:26

## 2017-06-22 RX ADMIN — CLOPIDOGREL BISULFATE 75 MILLIGRAM(S): 75 TABLET, FILM COATED ORAL at 13:53

## 2017-06-22 NOTE — PROGRESS NOTE ADULT - ASSESSMENT
83y Female whom presented to the hospital with  h/o DM Type II, PVD s/p Left  BKA, dementia, Afib, Major depression, HTN was admitted on 6/17 for lethargy, cough with renal evaluation of hypernatremia.     Hypernatremia - in setting of poor po intake   - dilute urine (urine osm 194) on 6/19 when serum na was 157 - responding to DDAVP  - given trial of  DDAVP -  suspect may have some degree chronic DI ?  - DDAVP 1mcg subcut given qhs x 1 dose yest - monitor Na levels daily - intranasal not available in hosp.  - holding IVF with  DDAVP, avoid overcorrection   - Optimize osmolar intake  - unclear etiology for suspected  DI   - repeat urine osm to asses response  reasses serum Na in AM   - consider CT head - r/o central causes

## 2017-06-23 LAB
ANION GAP SERPL CALC-SCNC: 4 MMOL/L — LOW (ref 5–17)
BUN SERPL-MCNC: 13 MG/DL — SIGNIFICANT CHANGE UP (ref 7–23)
CALCIUM SERPL-MCNC: 8.9 MG/DL — SIGNIFICANT CHANGE UP (ref 8.5–10.1)
CHLORIDE SERPL-SCNC: 109 MMOL/L — HIGH (ref 96–108)
CO2 SERPL-SCNC: 32 MMOL/L — HIGH (ref 22–31)
CREAT SERPL-MCNC: 0.78 MG/DL — SIGNIFICANT CHANGE UP (ref 0.5–1.3)
GLUCOSE SERPL-MCNC: 157 MG/DL — HIGH (ref 70–99)
POTASSIUM SERPL-MCNC: 4 MMOL/L — SIGNIFICANT CHANGE UP (ref 3.5–5.3)
POTASSIUM SERPL-SCNC: 4 MMOL/L — SIGNIFICANT CHANGE UP (ref 3.5–5.3)
SODIUM SERPL-SCNC: 145 MMOL/L — SIGNIFICANT CHANGE UP (ref 135–145)

## 2017-06-23 PROCEDURE — 71020: CPT | Mod: 26

## 2017-06-23 PROCEDURE — 70450 CT HEAD/BRAIN W/O DYE: CPT | Mod: 26

## 2017-06-23 RX ADMIN — Medication 100 MILLIGRAM(S): at 18:10

## 2017-06-23 RX ADMIN — Medication 1 TABLET(S): at 17:30

## 2017-06-23 RX ADMIN — HEPARIN SODIUM 5000 UNIT(S): 5000 INJECTION INTRAVENOUS; SUBCUTANEOUS at 17:33

## 2017-06-23 RX ADMIN — INSULIN GLARGINE 20 UNIT(S): 100 INJECTION, SOLUTION SUBCUTANEOUS at 22:32

## 2017-06-23 RX ADMIN — CEFEPIME 100 MILLIGRAM(S): 1 INJECTION, POWDER, FOR SOLUTION INTRAMUSCULAR; INTRAVENOUS at 17:30

## 2017-06-23 RX ADMIN — CLOPIDOGREL BISULFATE 75 MILLIGRAM(S): 75 TABLET, FILM COATED ORAL at 12:43

## 2017-06-23 RX ADMIN — Medication 1 TABLET(S): at 05:12

## 2017-06-23 RX ADMIN — Medication 12.5 MILLIGRAM(S): at 05:12

## 2017-06-23 RX ADMIN — CEFEPIME 100 MILLIGRAM(S): 1 INJECTION, POWDER, FOR SOLUTION INTRAMUSCULAR; INTRAVENOUS at 05:12

## 2017-06-23 RX ADMIN — Medication 81 MILLIGRAM(S): at 12:43

## 2017-06-23 RX ADMIN — MIRTAZAPINE 15 MILLIGRAM(S): 45 TABLET, ORALLY DISINTEGRATING ORAL at 22:31

## 2017-06-23 RX ADMIN — Medication 100 MILLIGRAM(S): at 05:12

## 2017-06-23 RX ADMIN — DESMOPRESSIN ACETATE 1 MICROGRAM(S): 0.1 TABLET ORAL at 22:48

## 2017-06-23 RX ADMIN — Medication 2: at 12:42

## 2017-06-23 RX ADMIN — Medication 2: at 09:29

## 2017-06-23 RX ADMIN — MEMANTINE HYDROCHLORIDE 10 MILLIGRAM(S): 10 TABLET ORAL at 17:35

## 2017-06-23 RX ADMIN — SIMVASTATIN 10 MILLIGRAM(S): 20 TABLET, FILM COATED ORAL at 22:31

## 2017-06-23 RX ADMIN — MEMANTINE HYDROCHLORIDE 10 MILLIGRAM(S): 10 TABLET ORAL at 05:12

## 2017-06-23 RX ADMIN — HEPARIN SODIUM 5000 UNIT(S): 5000 INJECTION INTRAVENOUS; SUBCUTANEOUS at 05:12

## 2017-06-23 NOTE — PROGRESS NOTE ADULT - ASSESSMENT
82 y/o Female with h/o DM Type II, PVD s/p Left  BKA, dementia, Afib, Major depression, HTN was admitted on 6/17 for lethargy, cough. She is reported with dry, nonproductive cough X 2 weeks. She is alert at baseline but not oriented. Reported with a nonbilious vomiting episode the day prior to admission, for which she received IVF in the facility . She was brought to ED today because family concerned about patient being more lethargic.    1. S/p sepsis with CNST. Multifocal pneumonia. Pyuria. Likely UTI. CRF stage 3.  -repeat BC x 2 is negative  -on cefepime 1 gm IV q12h # 4 and vancomycin 500 mg IV q12h # 5  -vancomycin trough level is therapeutic  -f/u CBC, BMP  -continue abx coverage  -aspiration precautions  -monitor temps  -f/u CBC  -supportive care  2. Other issues: DM Type II, PVD s/p Left  BKA, dementia, Afib, Major depression, HTN   -care per medicine

## 2017-06-23 NOTE — PROGRESS NOTE ADULT - ASSESSMENT
83y Female whom presented to the hospital with  h/o DM Type II, PVD s/p Left  BKA, dementia, Afib, Major depression, HTN was admitted on 6/17 for lethargy, cough with renal evaluation of hypernatremia.     Hypernatremia - in setting of poor po intake   - dilute urine (urine osm 194) on 6/19 when serum na was 157 - responding to DDAVP  - given trial of  DDAVP -  suspect may have some degree chronic DI   - DDAVP 1mcg subcut qhs, monitor Na levels daily - intranasal not available in hosp.  - holding IVF with  DDAVP, avoid overcorrection   - Optimize osmolar intake  - unclear etiology for suspected  DI , CT without obvious etiology

## 2017-06-24 LAB
ANION GAP SERPL CALC-SCNC: 5 MMOL/L — SIGNIFICANT CHANGE UP (ref 5–17)
BUN SERPL-MCNC: 14 MG/DL — SIGNIFICANT CHANGE UP (ref 7–23)
CALCIUM SERPL-MCNC: 9 MG/DL — SIGNIFICANT CHANGE UP (ref 8.5–10.1)
CHLORIDE SERPL-SCNC: 109 MMOL/L — HIGH (ref 96–108)
CO2 SERPL-SCNC: 31 MMOL/L — SIGNIFICANT CHANGE UP (ref 22–31)
CREAT SERPL-MCNC: 0.7 MG/DL — SIGNIFICANT CHANGE UP (ref 0.5–1.3)
GLUCOSE SERPL-MCNC: 167 MG/DL — HIGH (ref 70–99)
NT-PROBNP SERPL-SCNC: 233 PG/ML — SIGNIFICANT CHANGE UP (ref 0–450)
POTASSIUM SERPL-MCNC: 4.1 MMOL/L — SIGNIFICANT CHANGE UP (ref 3.5–5.3)
POTASSIUM SERPL-SCNC: 4.1 MMOL/L — SIGNIFICANT CHANGE UP (ref 3.5–5.3)
SODIUM SERPL-SCNC: 145 MMOL/L — SIGNIFICANT CHANGE UP (ref 135–145)

## 2017-06-24 RX ADMIN — MIRTAZAPINE 15 MILLIGRAM(S): 45 TABLET, ORALLY DISINTEGRATING ORAL at 22:05

## 2017-06-24 RX ADMIN — HEPARIN SODIUM 5000 UNIT(S): 5000 INJECTION INTRAVENOUS; SUBCUTANEOUS at 18:01

## 2017-06-24 RX ADMIN — Medication 1 TABLET(S): at 05:35

## 2017-06-24 RX ADMIN — Medication 100 MILLIGRAM(S): at 18:46

## 2017-06-24 RX ADMIN — Medication 1 TABLET(S): at 17:58

## 2017-06-24 RX ADMIN — MEMANTINE HYDROCHLORIDE 10 MILLIGRAM(S): 10 TABLET ORAL at 05:35

## 2017-06-24 RX ADMIN — SIMVASTATIN 10 MILLIGRAM(S): 20 TABLET, FILM COATED ORAL at 22:05

## 2017-06-24 RX ADMIN — Medication 2: at 11:53

## 2017-06-24 RX ADMIN — Medication 2: at 11:23

## 2017-06-24 RX ADMIN — CEFEPIME 100 MILLIGRAM(S): 1 INJECTION, POWDER, FOR SOLUTION INTRAMUSCULAR; INTRAVENOUS at 05:35

## 2017-06-24 RX ADMIN — INSULIN GLARGINE 20 UNIT(S): 100 INJECTION, SOLUTION SUBCUTANEOUS at 22:07

## 2017-06-24 RX ADMIN — Medication 81 MILLIGRAM(S): at 11:28

## 2017-06-24 RX ADMIN — Medication 12.5 MILLIGRAM(S): at 18:00

## 2017-06-24 RX ADMIN — Medication 12.5 MILLIGRAM(S): at 05:35

## 2017-06-24 RX ADMIN — DESMOPRESSIN ACETATE 1 MICROGRAM(S): 0.1 TABLET ORAL at 22:06

## 2017-06-24 RX ADMIN — MEMANTINE HYDROCHLORIDE 10 MILLIGRAM(S): 10 TABLET ORAL at 17:58

## 2017-06-24 RX ADMIN — CEFEPIME 100 MILLIGRAM(S): 1 INJECTION, POWDER, FOR SOLUTION INTRAMUSCULAR; INTRAVENOUS at 18:06

## 2017-06-24 RX ADMIN — CLOPIDOGREL BISULFATE 75 MILLIGRAM(S): 75 TABLET, FILM COATED ORAL at 11:28

## 2017-06-24 RX ADMIN — HEPARIN SODIUM 5000 UNIT(S): 5000 INJECTION INTRAVENOUS; SUBCUTANEOUS at 05:35

## 2017-06-24 RX ADMIN — Medication 100 MILLIGRAM(S): at 06:24

## 2017-06-24 NOTE — PROGRESS NOTE ADULT - ASSESSMENT
83y Female whom presented to the hospital with  h/o DM Type II, PVD s/p Left  BKA, dementia, Afib, Major depression, HTN was admitted on 6/17 for lethargy, cough with renal evaluation of hypernatremia.     Hypernatremia - in setting of poor po intake   - dilute urine (urine osm 194) on 6/19 when serum na was 157 - responding to DDAVP  - DDAVP 1mcg subcut qhs, monitor Na levels daily - intranasal not available in hosp, likely will need 10 mcg nasal with d/c  - holding IVF with  DDAVP, avoid overcorrection   - Optimize osmolar intake  - unclear etiology for suspected  DI , CT without obvious etiology. Past medication use?

## 2017-06-25 LAB
ANION GAP SERPL CALC-SCNC: 5 MMOL/L — SIGNIFICANT CHANGE UP (ref 5–17)
BUN SERPL-MCNC: 13 MG/DL — SIGNIFICANT CHANGE UP (ref 7–23)
CALCIUM SERPL-MCNC: 8.9 MG/DL — SIGNIFICANT CHANGE UP (ref 8.5–10.1)
CHLORIDE SERPL-SCNC: 107 MMOL/L — SIGNIFICANT CHANGE UP (ref 96–108)
CO2 SERPL-SCNC: 28 MMOL/L — SIGNIFICANT CHANGE UP (ref 22–31)
CREAT SERPL-MCNC: 0.63 MG/DL — SIGNIFICANT CHANGE UP (ref 0.5–1.3)
CULTURE RESULTS: SIGNIFICANT CHANGE UP
GLUCOSE SERPL-MCNC: 101 MG/DL — HIGH (ref 70–99)
POTASSIUM SERPL-MCNC: 4 MMOL/L — SIGNIFICANT CHANGE UP (ref 3.5–5.3)
POTASSIUM SERPL-SCNC: 4 MMOL/L — SIGNIFICANT CHANGE UP (ref 3.5–5.3)
SODIUM SERPL-SCNC: 140 MMOL/L — SIGNIFICANT CHANGE UP (ref 135–145)
SPECIMEN SOURCE: SIGNIFICANT CHANGE UP

## 2017-06-25 RX ADMIN — HEPARIN SODIUM 5000 UNIT(S): 5000 INJECTION INTRAVENOUS; SUBCUTANEOUS at 06:13

## 2017-06-25 RX ADMIN — Medication 200 MILLIGRAM(S): at 12:33

## 2017-06-25 RX ADMIN — Medication 1 TABLET(S): at 17:51

## 2017-06-25 RX ADMIN — CLOPIDOGREL BISULFATE 75 MILLIGRAM(S): 75 TABLET, FILM COATED ORAL at 12:33

## 2017-06-25 RX ADMIN — HEPARIN SODIUM 5000 UNIT(S): 5000 INJECTION INTRAVENOUS; SUBCUTANEOUS at 17:50

## 2017-06-25 RX ADMIN — Medication 100 MILLIGRAM(S): at 07:17

## 2017-06-25 RX ADMIN — CEFEPIME 100 MILLIGRAM(S): 1 INJECTION, POWDER, FOR SOLUTION INTRAMUSCULAR; INTRAVENOUS at 17:50

## 2017-06-25 RX ADMIN — Medication 2: at 17:50

## 2017-06-25 RX ADMIN — INSULIN GLARGINE 20 UNIT(S): 100 INJECTION, SOLUTION SUBCUTANEOUS at 22:50

## 2017-06-25 RX ADMIN — MEMANTINE HYDROCHLORIDE 10 MILLIGRAM(S): 10 TABLET ORAL at 17:51

## 2017-06-25 RX ADMIN — Medication 12.5 MILLIGRAM(S): at 12:33

## 2017-06-25 RX ADMIN — MIRTAZAPINE 15 MILLIGRAM(S): 45 TABLET, ORALLY DISINTEGRATING ORAL at 22:50

## 2017-06-25 RX ADMIN — Medication 81 MILLIGRAM(S): at 12:33

## 2017-06-25 RX ADMIN — Medication 12.5 MILLIGRAM(S): at 17:56

## 2017-06-25 RX ADMIN — CEFEPIME 100 MILLIGRAM(S): 1 INJECTION, POWDER, FOR SOLUTION INTRAMUSCULAR; INTRAVENOUS at 06:13

## 2017-06-25 RX ADMIN — DESMOPRESSIN ACETATE 1 MICROGRAM(S): 0.1 TABLET ORAL at 22:53

## 2017-06-25 RX ADMIN — SIMVASTATIN 10 MILLIGRAM(S): 20 TABLET, FILM COATED ORAL at 22:50

## 2017-06-25 RX ADMIN — Medication 100 MILLIGRAM(S): at 18:50

## 2017-06-25 NOTE — PROVIDER CONTACT NOTE (OTHER) - REASON
Heparin to be administered 6/26/17 @ 0700 and last CBC drawn 6/22/17
Na 160
Nephrology Consult
Consult

## 2017-06-25 NOTE — PROVIDER CONTACT NOTE (OTHER) - SITUATION
left message with answering service;  aware of consult.
Heparin to be administered 6/26/17 @ 0700 and last CBC drawn 6/22/17 with a platelet count of 130
Na 160
Spoke to Adriane @ service

## 2017-06-25 NOTE — PROGRESS NOTE ADULT - ASSESSMENT
83y Female whom presented to the hospital with  h/o DM Type II, PVD s/p Left  BKA, dementia, Afib, Major depression, HTN was admitted on 6/17 for lethargy, cough with renal evaluation of hypernatremia.     Hypernatremia - in setting of poor po intake   - dilute urine (urine osm 194) on 6/19 when serum na was 157 - responding to DDAVP, today SNa 140 -- monitor daily, may need to decrease DDAVP dosing  - DDAVP 1mcg subcut qhs, monitor Na levels daily - intranasal not available in hosp, likely will need 10 mcg nasal with d/c  - holding IVF with  DDAVP, avoid overcorrection   - Optimize osmolar intake  - unclear etiology for suspected DI , CT without obvious etiology. Past medication use?      Dr. Chawla to resume care 6/26.

## 2017-06-26 LAB
ANION GAP SERPL CALC-SCNC: 2 MMOL/L — LOW (ref 5–17)
BUN SERPL-MCNC: 12 MG/DL — SIGNIFICANT CHANGE UP (ref 7–23)
CALCIUM SERPL-MCNC: 9.2 MG/DL — SIGNIFICANT CHANGE UP (ref 8.5–10.1)
CHLORIDE SERPL-SCNC: 107 MMOL/L — SIGNIFICANT CHANGE UP (ref 96–108)
CO2 SERPL-SCNC: 31 MMOL/L — SIGNIFICANT CHANGE UP (ref 22–31)
CREAT SERPL-MCNC: 0.73 MG/DL — SIGNIFICANT CHANGE UP (ref 0.5–1.3)
CULTURE RESULTS: SIGNIFICANT CHANGE UP
GLUCOSE SERPL-MCNC: 112 MG/DL — HIGH (ref 70–99)
HCT VFR BLD CALC: 37.6 % — SIGNIFICANT CHANGE UP (ref 34.5–45)
HGB BLD-MCNC: 12.1 G/DL — SIGNIFICANT CHANGE UP (ref 11.5–15.5)
MAGNESIUM SERPL-MCNC: 2.2 MG/DL — SIGNIFICANT CHANGE UP (ref 1.6–2.6)
MCHC RBC-ENTMCNC: 29.9 PG — SIGNIFICANT CHANGE UP (ref 27–34)
MCHC RBC-ENTMCNC: 32.2 GM/DL — SIGNIFICANT CHANGE UP (ref 32–36)
MCV RBC AUTO: 92.7 FL — SIGNIFICANT CHANGE UP (ref 80–100)
PHOSPHATE SERPL-MCNC: 2.9 MG/DL — SIGNIFICANT CHANGE UP (ref 2.5–4.5)
PLATELET # BLD AUTO: 136 K/UL — LOW (ref 150–400)
POTASSIUM SERPL-MCNC: 4.2 MMOL/L — SIGNIFICANT CHANGE UP (ref 3.5–5.3)
POTASSIUM SERPL-SCNC: 4.2 MMOL/L — SIGNIFICANT CHANGE UP (ref 3.5–5.3)
RBC # BLD: 4.06 M/UL — SIGNIFICANT CHANGE UP (ref 3.8–5.2)
RBC # FLD: 14.4 % — SIGNIFICANT CHANGE UP (ref 10.3–14.5)
SODIUM SERPL-SCNC: 140 MMOL/L — SIGNIFICANT CHANGE UP (ref 135–145)
SPECIMEN SOURCE: SIGNIFICANT CHANGE UP
WBC # BLD: 6.7 K/UL — SIGNIFICANT CHANGE UP (ref 3.8–10.5)
WBC # FLD AUTO: 6.7 K/UL — SIGNIFICANT CHANGE UP (ref 3.8–10.5)

## 2017-06-26 RX ORDER — DILTIAZEM HCL 120 MG
1 CAPSULE, EXT RELEASE 24 HR ORAL
Qty: 0 | Refills: 0 | COMMUNITY
Start: 2017-06-26

## 2017-06-26 RX ADMIN — CLOPIDOGREL BISULFATE 75 MILLIGRAM(S): 75 TABLET, FILM COATED ORAL at 11:46

## 2017-06-26 RX ADMIN — Medication 12.5 MILLIGRAM(S): at 17:48

## 2017-06-26 RX ADMIN — MIRTAZAPINE 15 MILLIGRAM(S): 45 TABLET, ORALLY DISINTEGRATING ORAL at 22:46

## 2017-06-26 RX ADMIN — Medication 2: at 12:10

## 2017-06-26 RX ADMIN — CEFEPIME 100 MILLIGRAM(S): 1 INJECTION, POWDER, FOR SOLUTION INTRAMUSCULAR; INTRAVENOUS at 22:36

## 2017-06-26 RX ADMIN — HEPARIN SODIUM 5000 UNIT(S): 5000 INJECTION INTRAVENOUS; SUBCUTANEOUS at 11:38

## 2017-06-26 RX ADMIN — Medication 100 MILLIGRAM(S): at 17:48

## 2017-06-26 RX ADMIN — HEPARIN SODIUM 5000 UNIT(S): 5000 INJECTION INTRAVENOUS; SUBCUTANEOUS at 17:57

## 2017-06-26 RX ADMIN — SIMVASTATIN 10 MILLIGRAM(S): 20 TABLET, FILM COATED ORAL at 22:46

## 2017-06-26 RX ADMIN — Medication 100 MILLIGRAM(S): at 07:22

## 2017-06-26 RX ADMIN — Medication 81 MILLIGRAM(S): at 11:40

## 2017-06-26 RX ADMIN — MEMANTINE HYDROCHLORIDE 10 MILLIGRAM(S): 10 TABLET ORAL at 17:57

## 2017-06-26 RX ADMIN — INSULIN GLARGINE 20 UNIT(S): 100 INJECTION, SOLUTION SUBCUTANEOUS at 22:42

## 2017-06-26 RX ADMIN — CEFEPIME 100 MILLIGRAM(S): 1 INJECTION, POWDER, FOR SOLUTION INTRAMUSCULAR; INTRAVENOUS at 06:34

## 2017-06-26 RX ADMIN — Medication 1 TABLET(S): at 11:40

## 2017-06-26 NOTE — PROGRESS NOTE ADULT - ASSESSMENT
83y Female whom presented to the hospital with  h/o DM Type II, PVD s/p Left  BKA, dementia, Afib, Major depression, HTN was admitted on 6/17 for lethargy, cough with renal evaluation of hypernatremia.     Hypernatremia - in setting of poor po intake   - dilute urine (urine osm 194) on 6/19 when serum na was 157 - responding to DDAVP, today SNa 140 may need to decrease DDAVP dosing  - DDAVP 10 mcg nasal with d/c, if Na <140 halve dose  - holding IVF with  DDAVP, avoid overcorrection   - Optimize osmolar intake  - unclear etiology for suspected DI , CT without obvious etiology. Past medication use?    can f/u with me as outpatient if able to visit office

## 2017-06-26 NOTE — DISCHARGE NOTE ADULT - SECONDARY DIAGNOSIS.
Hypernatremia Dementia without behavioral disturbance, unspecified dementia type Essential hypertension Hyperlipidemia, unspecified hyperlipidemia type Atrial fibrillation, unspecified type

## 2017-06-26 NOTE — DISCHARGE NOTE ADULT - CARE PROVIDER_API CALL
Dio Chawla), Internal Medicine; Nephrology  [address] [place] [address]  Phone: [phone]  Fax: [phone]    Nursing Home Physician,   recommend checking basic metabolic profile in 3 days  Phone: (   )    -  Fax: (   )    -

## 2017-06-26 NOTE — DISCHARGE NOTE ADULT - MEDICATION SUMMARY - MEDICATIONS TO CHANGE
I will SWITCH the dose or number of times a day I take the medications listed below when I get home from the hospital:    dilTIAZem 300 mg/24 hours oral capsule, extended release  -- 1 cap(s) by mouth once a day

## 2017-06-26 NOTE — DISCHARGE NOTE ADULT - PLAN OF CARE
prevent worsening completed IV antibiotic regimen while inpatient  no further antibiotic required.  Keep HOB elevated during meals maintain normal sodium level cotninue DDAVP inhalation  Repeat sodium levels in 1-3 days   f/u with your nephrologist or PCP in the facility supportive care contineu nameneda continue antihypertensive regimen continue statin drugs contineu lopressor and cardizem resolution continue namenda

## 2017-06-26 NOTE — DISCHARGE NOTE ADULT - PROVIDER TOKENS
TOKEN:'[id number]',FREE:[LAST:[Nursing Home Physician],PHONE:[(   )    -],FAX:[(   )    -],ADDRESS:[recommend checking basic metabolic profile in 3 days]]

## 2017-06-26 NOTE — DISCHARGE NOTE ADULT - CARE PLAN
Principal Discharge DX:	Aspiration pneumonia, unspecified aspiration pneumonia type, unspecified laterality, unspecified part of lung  Goal:	prevent worsening  Instructions for follow-up, activity and diet:	completed IV antibiotic regimen while inpatient  no further antibiotic required.  Keep HOB elevated during meals  Secondary Diagnosis:	Hypernatremia  Goal:	maintain normal sodium level  Instructions for follow-up, activity and diet:	cotninue DDAVP inhalation  Repeat sodium levels in 1-3 days   f/u with your nephrologist or PCP in the facility  Secondary Diagnosis:	Dementia without behavioral disturbance, unspecified dementia type  Goal:	supportive care  Instructions for follow-up, activity and diet:	contineu nameneda  Secondary Diagnosis:	Essential hypertension  Instructions for follow-up, activity and diet:	continue antihypertensive regimen  Secondary Diagnosis:	Hyperlipidemia, unspecified hyperlipidemia type  Instructions for follow-up, activity and diet:	continue statin drugs  Secondary Diagnosis:	Atrial fibrillation, unspecified type  Instructions for follow-up, activity and diet:	contineu lopressor and cardizem Principal Discharge DX:	Aspiration pneumonia, unspecified aspiration pneumonia type, unspecified laterality, unspecified part of lung  Goal:	resolution  Instructions for follow-up, activity and diet:	completed IV antibiotic regimen while inpatient  no further antibiotic required.  Keep HOB elevated during meals  Secondary Diagnosis:	Hypernatremia  Goal:	maintain normal sodium level  Instructions for follow-up, activity and diet:	cotninue DDAVP inhalation  Repeat sodium levels in 1-3 days   f/u with your nephrologist or PCP in the facility  Secondary Diagnosis:	Dementia without behavioral disturbance, unspecified dementia type  Goal:	supportive care  Instructions for follow-up, activity and diet:	continue namenda  Secondary Diagnosis:	Essential hypertension  Instructions for follow-up, activity and diet:	continue antihypertensive regimen  Secondary Diagnosis:	Hyperlipidemia, unspecified hyperlipidemia type  Instructions for follow-up, activity and diet:	continue statin drugs  Secondary Diagnosis:	Atrial fibrillation, unspecified type  Instructions for follow-up, activity and diet:	contineu lopressor and cardizem

## 2017-06-26 NOTE — PROGRESS NOTE ADULT - ASSESSMENT
84 y/o Female with h/o DM Type II, PVD s/p Left  BKA, dementia, Afib, Major depression, HTN was admitted on 6/17 for lethargy, cough. She is reported with dry, nonproductive cough X 2 weeks. She is alert at baseline but not oriented. Reported with a nonbilious vomiting episode the day prior to admission, for which she received IVF in the facility . She was brought to ED today because family concerned about patient being more lethargic.    1. S/p sepsis with CNST. Multifocal pneumonia resolving. Pyuria. Likely UTI. CRF stage 3.  -repeat BC x 2 is negative  -on cefepime 1 gm IV q12h # 7 and vancomycin 500 mg IV q12h # 8  -vancomycin trough level is therapeutic  -f/u CBC, BMP  -would complete abx therapy today  -aspiration precautions  -monitor temps  -f/u CBC  -supportive care  2. Other issues: DM Type II, PVD s/p Left  BKA, dementia, Afib, Major depression, HTN   -care per medicine

## 2017-06-26 NOTE — DISCHARGE NOTE ADULT - HOSPITAL COURSE
82 y/o F with Pmhx of DM Type II on Insulin (LAst A1c 10.6), PVD s/p Left  BKA, dementia, Afib, Major depression, HTN who presented to the ED with C/o lethargy, cough.   Cough X 2 weeks, dry, nonproductive. She is alert at baseline but not oriented and has underlying dementia. Non verbal and requires help with feeding. Incontinent with bladder and bowel. There are also C/o nonbilious vomiting episode, for which she received IVF in the facility .She Had labs and CXR a week pta, which showed Na as 148 and Cr as 0.7 GFR was >60.  Was brought to ED because family concerned about patient being more lethargic. CT scan of chest showed possible b/l PNA.  Sodium was elevated at 165 (Corrected sodium was 169) which rapidly corrected to 155 (corrected ) after 300 ml of D5 given over 90 min in conjunction with Abx.  On telemetry she had multiple beat run of SVT. She was admitted to tele with Pneumonia/hypernatremia/dehydration and UTI. Hospital course notable for staph bacteremia. ID consulted and patient is currently on abx. Repeat blood cx have been negative. Was also seen by nephrology for hypernatremia. Felt to have some degree of DI. Started on ddavp. Sodium now stable. Mental status appears to be near baseline.     6/26- alert, follows simple commands. no overnight events      Review of system-unable to obtain due to dementia.    Vital Signs Last 24 Hrs  T(C): 36.3, Max: 36.7 (06-25 @ 21:44)  T(F): 97.4, Max: 98 (06-25 @ 21:44)  HR: 86 (65 - 86)  BP: 147/78 (117/72 - 151/69)  BP(mean): --  RR: 16 (16 - 18)  SpO2: 99% (94% - 99%)    PHYSICAL EXAM:    GENERAL: sleeping but arousable. minimally verbal.   HEAD:  Atraumatic, Normocephalic  EYES: EOMI, PERRLA  HEENT:dry mucous membranes  NECK: Supple, No JVD  NERVOUS SYSTEM: arousable. does not follow commands.   CHEST/LUNG: decreased bs at bases.  HEART: Regular rate and rhythm  ABDOMEN: Soft, Nontender, Nondistended; Bowel sounds present  GENITOURINARY- Voiding, no palpable bladder  EXTREMITIES:  No clubbing, cyanosis, or edema, L bka  MUSCULOSKELTAL- No muscle tenderness, No joint tenderness  SKIN-no rash    MEDICATIONS: reviewed  Laboratory values  06-26    140  |  107  |  12  ----------------------------<  112<H>  4.2   |  31  |  0.73    Ca    9.2      26 Jun 2017 05:53  Phos  2.9     06-26  Mg     2.2     06-26       PLAN  83F, pmh as above a/w:    Multifocal pneumonia presumed aspiration, with bacteremia and UTI:   -positive BCx for staph, abx per ID- completed IV antibiotic   -repeat blood cultures negative  -completed cefepime and vancomycin   - ID consult appreciated  -repeat xray 6/22 --> Bibasilar atelectasis with medial right lower lobe airspace disease, question pneumonia.  -leukocytosis resolved.    Left upper extremity edema: Dependent - multifactorial, immobility hypoalbuminemia/poor nutrition  -doppler negative for DVT      Hypernatremia secondary to dehydration and some degree chronic DI: RESOLVED.  -off IVFs  -per nephro --> c/w DDAVP.  Will need intranasal on discharge.- on Intranasal DDAVP as per nephrology recommendations- will need close f/u as an outpatient - avoid overcorrection of NA- recommend repeat Na level in 1-3 days  - f/u with neprhologist  -CT head negative for acute pathology.    Hypokalemia: RESOLVED.  -supplemental.    Metabolic encephalopathy in setting of advanced dementia: RESOLVED  -continue to treat underlying cause  -continue namenda.  -supportive care.     h/o Atrial fibrillation with svt on tele while in ED  -in sinus here  -continue ccb/bb  -not on a/c    PVD s/p left bka:  -continue plavix    HTN:  -continue diltiazem and lopressor    Uncontrolled DM: Pt eating less and needs frequent insulin adjustment.  -hgba1c >10 on admission.  -adjust insulin for goal FS < 200     Dyslipidemia:  -continue statin     DVT px    Dispo: Plan for discharge today. case d/w Dr Zamarripa. 84 y/o F with Pmhx of DM Type II on Insulin (LAst A1c 10.6), PVD s/p Left  BKA, dementia, Afib, Major depression, HTN who presented to the ED with C/o lethargy, cough.   Cough X 2 weeks, dry, nonproductive. She is alert at baseline but not oriented and has underlying dementia. Non verbal and requires help with feeding. Incontinent with bladder and bowel. There are also C/o nonbilious vomiting episode, for which she received IVF in the facility .She Had labs and CXR a week pta, which showed Na as 148 and Cr as 0.7 GFR was >60.  Was brought to ED because family concerned about patient being more lethargic. CT scan of chest showed possible b/l PNA.  Sodium was elevated at 165 (Corrected sodium was 169) which rapidly corrected to 155 (corrected ) after 300 ml of D5 given over 90 min in conjunction with Abx.  On telemetry she had multiple beat run of SVT. She was admitted to tele with Pneumonia/hypernatremia/dehydration and UTI. Hospital course notable for staph bacteremia. ID consulted and patient is currently on abx. Repeat blood cx have been negative. Was also seen by nephrology for hypernatremia. Felt to have some degree of DI. Started on ddavp. Sodium now stable. Mental status appears to be near baseline.     6/26- alert, follows simple commands. no overnight events      Review of system-unable to obtain due to dementia.    Vital Signs Last 24 Hrs  T(C): 36.3, Max: 36.7 (06-25 @ 21:44)  T(F): 97.4, Max: 98 (06-25 @ 21:44)  HR: 86 (65 - 86)  BP: 147/78 (117/72 - 151/69)  BP(mean): --  RR: 16 (16 - 18)  SpO2: 99% (94% - 99%)    PHYSICAL EXAM:    GENERAL: sleeping but arousable. minimally verbal.   HEAD:  Atraumatic, Normocephalic  EYES: EOMI, PERRLA  HEENT:dry mucous membranes  NECK: Supple, No JVD  NERVOUS SYSTEM: arousable. does not follow commands.   CHEST/LUNG: decreased bs at bases.  HEART: Regular rate and rhythm  ABDOMEN: Soft, Nontender, Nondistended; Bowel sounds present  GENITOURINARY- Voiding, no palpable bladder  EXTREMITIES:  No clubbing, cyanosis, or edema, L bka  MUSCULOSKELTAL- No muscle tenderness, No joint tenderness  SKIN-no rash    MEDICATIONS: reviewed  Laboratory values  06-26    140  |  107  |  12  ----------------------------<  112<H>  4.2   |  31  |  0.73    Ca    9.2      26 Jun 2017 05:53  Phos  2.9     06-26  Mg     2.2     06-26       PLAN  83F, pmh as above a/w:    Multifocal pneumonia presumed aspiration, with bacteremia and UTI:   -positive BCx for staph, abx per ID- completed IV antibiotic   -repeat blood cultures negative  -completed cefepime and vancomycin   - ID consult appreciated  -repeat xray 6/22 --> Bibasilar atelectasis with medial right lower lobe airspace disease, question pneumonia.  -leukocytosis resolved.    Left upper extremity edema: Dependent - multifactorial, immobility hypoalbuminemia/poor nutrition  -doppler negative for DVT      Hypernatremia secondary to dehydration and some degree chronic DI: RESOLVED.  -off IVFs  -per nephro --> c/w DDAVP.  Will need intranasal on discharge.- on Intranasal DDAVP as per nephrology recommendations- will need close f/u as an outpatient - avoid overcorrection of NA- recommend repeat Na level in 1-3 days  - f/u with neprhologist  -CT head negative for acute pathology.    Hypokalemia: RESOLVED.  -supplemental.    Metabolic encephalopathy in setting of advanced dementia: RESOLVED  -continue to treat underlying cause  -continue namenda.  -supportive care.     h/o Atrial fibrillation with svt on tele while in ED  -in sinus here  -continue ccb/bb  -not on a/c    PVD s/p left bka:  -continue plavix    HTN:  -continue diltiazem and lopressor    Uncontrolled DM: Pt eating less and needs frequent insulin adjustment.  -hgba1c >10 on admission.  -adjust insulin for goal FS < 200     Dyslipidemia:  -continue statin     DVT px    Dispo: Plan for discharge today. case d/w Dr Zamarripa.     Attending attestation: Agree with note per DUSTIN Mccain with edits made where appropriate. Time spent on discharge 50 minutes.  D/w Dr. Barillas

## 2017-06-26 NOTE — DISCHARGE NOTE ADULT - MEDICATION SUMMARY - MEDICATIONS TO STOP TAKING
I will STOP taking the medications listed below when I get home from the hospital:    famotidine 20 mg oral tablet  -- 1 tab(s) by mouth once a day    furosemide 20 mg oral tablet  -- 1 tab(s) by mouth once a day    potassium chloride 10 mEq oral capsule, extended release  -- 1 cap(s) by mouth once a day

## 2017-06-26 NOTE — DISCHARGE NOTE ADULT - MEDICATION SUMMARY - MEDICATIONS TO TAKE
I will START or STAY ON the medications listed below when I get home from the hospital:    aspirin 81 mg oral tablet, chewable  -- 1 tab(s) by mouth once a day  -- Indication: For CAD    dilTIAZem 60 mg oral tablet  -- 1 tab(s) by mouth every 6 hours  -- Indication: For Hypertension/ atrial fibrilaltion    mirtazapine 15 mg oral tablet  -- 1 tab(s) by mouth once a day (at bedtime)  -- Indication: For Antidepressant    Levemir FlexPen  -- 35 unit(s) subcutaneously once a day (at bedtime)  -- Indication: For Diabetes mellitus    NovoLOG FlexPen 100 units/mL subcutaneous solution  -- 15 unit(s) subcutaneous once a day (in the morning)  -- Indication: For Diabetes mellitus    NovoLOG FlexPen 100 units/mL subcutaneous solution  --  subcutaneous as per sliding scale   -- Indication: For Diabetes mellitus    desmopressin nasal  -- 10 microgram(s) into nose once a day  -- repeat sodium level in 1-3days  -- Indication: For Hypernatremia    simvastatin 10 mg oral tablet  -- 1 tab(s) by mouth once a day (at bedtime)  -- Indication: For Hyperlipidemia    Plavix 75 mg oral tablet  -- 1 tab(s) by mouth once a day  -- Indication: For CAD    Lopressor  -- 12.5 milligram(s) by mouth 2 times a day  -- Indication: For Hypertension    Muscle Rub 10%-15% topical cream  -- Apply on skin to affected area right knee every 6 hours as needed for pain  -- Indication: For general    Vitamin A & D topical ointment  -- Apply on skin to affected area right lower limb 2 times a day  -- Indication: For Supplement    Colace 100 mg oral capsule  -- 2 cap(s) by mouth once a day  -- Indication: For constipation    memantine 10 mg oral tablet  -- 1 tab(s) by mouth 2 times a day  -- Indication: For Dementai    melatonin 3 mg oral tablet  -- 1 tab(s) by mouth once (at bedtime)  -- Indication: For Sleep aid    calcium-vitamin D 500 mg-200 intl units oral tablet  -- 1 tab(s) by mouth 2 times a day  -- Indication: For Supplement

## 2017-06-27 VITALS
OXYGEN SATURATION: 100 % | RESPIRATION RATE: 18 BRPM | SYSTOLIC BLOOD PRESSURE: 135 MMHG | TEMPERATURE: 99 F | DIASTOLIC BLOOD PRESSURE: 59 MMHG | HEART RATE: 68 BPM

## 2017-06-27 RX ADMIN — MEMANTINE HYDROCHLORIDE 10 MILLIGRAM(S): 10 TABLET ORAL at 05:31

## 2017-06-27 RX ADMIN — Medication 81 MILLIGRAM(S): at 12:54

## 2017-06-27 RX ADMIN — Medication 100 MILLIGRAM(S): at 06:21

## 2017-06-27 RX ADMIN — Medication 2: at 09:14

## 2017-06-27 RX ADMIN — DESMOPRESSIN ACETATE 1 MICROGRAM(S): 0.1 TABLET ORAL at 00:06

## 2017-06-27 RX ADMIN — Medication 12.5 MILLIGRAM(S): at 05:31

## 2017-06-27 RX ADMIN — HEPARIN SODIUM 5000 UNIT(S): 5000 INJECTION INTRAVENOUS; SUBCUTANEOUS at 05:31

## 2017-06-27 RX ADMIN — CEFEPIME 100 MILLIGRAM(S): 1 INJECTION, POWDER, FOR SOLUTION INTRAMUSCULAR; INTRAVENOUS at 05:25

## 2017-06-27 RX ADMIN — CLOPIDOGREL BISULFATE 75 MILLIGRAM(S): 75 TABLET, FILM COATED ORAL at 12:54

## 2017-06-27 NOTE — PROGRESS NOTE ADULT - ASSESSMENT
83y Female whom presented to the hospital with  h/o DM Type II, PVD s/p Left  BKA, dementia, Afib, Major depression, HTN was admitted on 6/17 for lethargy, cough with renal evaluation of hypernatremia.     Hypernatremia - in setting of poor po intake   - dilute urine (urine osm 194) on 6/19 when serum na was 157 - responding to DDAVP  - DDAVP 10 mcg nasal with d/c, if Na <140 halve dose  - holding IVF with  DDAVP, avoid overcorrection   - Optimize osmolar intake  - unclear etiology for suspected DI , CT without obvious etiology. Past medication use?  -No labs today, check in AM

## 2017-06-27 NOTE — PROGRESS NOTE ADULT - SUBJECTIVE AND OBJECTIVE BOX
Patient is a 83y Female with no new events.   Intake limited  Poor Historian     REVIEW OF SYSTEMS:    UTO due to dementia    MEDICATIONS  (STANDING):  mirtazapine 15milliGRAM(s) Oral at bedtime  simvastatin 10milliGRAM(s) Oral at bedtime  clopidogrel Tablet 75milliGRAM(s) Oral daily  metoprolol 12.5milliGRAM(s) Oral two times a day  memantine 10milliGRAM(s) Oral two times a day  calcium carbonate 1250 mG + Vitamin D (OsCal 500 + D) 1Tablet(s) Oral two times a day  insulin lispro (HumaLOG) corrective regimen sliding scale  SubCutaneous three times a day before meals  dextrose 5%. 1000milliLiter(s) IV Continuous <Continuous>  dextrose 50% Injectable 12.5Gram(s) IV Push once  dextrose 50% Injectable 25Gram(s) IV Push once  dextrose 50% Injectable 25Gram(s) IV Push once  docusate sodium 200milliGRAM(s) Oral daily  cefepime  IVPB 1000milliGRAM(s) IV Intermittent every 12 hours  vancomycin  IVPB  IV Intermittent   vancomycin  IVPB 500milliGRAM(s) IV Intermittent every 12 hours  diltiazem    Tablet 60milliGRAM(s) Oral every 6 hours  desmopressin Injectable 1MICROGram(s) SubCutaneous at bedtime  insulin glargine Injectable (LANTUS) 20Unit(s) SubCutaneous at bedtime  aspirin  chewable 81milliGRAM(s) Oral daily  heparin  Injectable 5000Unit(s) SubCutaneous every 12 hours      Vital Signs Last 24 Hrs  T(C): 36.4, Max: 37.4 (06-21 @ 21:49)  T(F): 97.6, Max: 99.3 (06-21 @ 21:49)  HR: 66 (60 - 104)  BP: 115/61 (115/61 - 125/47)  BP(mean): --  RR: 14 (14 - 17)  SpO2: 97% (94% - 97%)    I&O's Summary    I & Os for current day (as of 22 Jun 2017 14:10)  =============================================  IN: 360 ml / OUT: 0 ml / NET: 360 ml        PHYSICAL EXAM:    Constitutional: frail  HEENT: PERRLA, EOMI,  MMM  Neck: No LAD, No JVD  Respiratory: distant  Cardiovascular: S1 and S2, RRR  Gastrointestinal: BS+, soft, NT/ND  Extremities: RLE No peripheral edema w  Left AKA   Neurological: Alert, not following command  : No Lopez  Skin: No rashes  Access: Not applicable        LABS:    145    |  113    |  12     ----------------------------<  178       22 Jun 2017 05:43  4.0     |  27     |  0.75     150    |  114    |  9      ----------------------------<  94        21 Jun 2017 16:36  3.7     |  31     |  0.74     153    |  119    |  11     ----------------------------<  127       21 Jun 2017 04:32  4.0     |  28     |  0.69       Basic Metabolic Panel (06.19.17 @ 06:42)    Sodium, Serum: 157 mmol/L    Potassium, Serum: 3.4 mmol/L    Chloride, Serum: 124 mmol/L    Carbon Dioxide, Serum: 28 mmol/L    Anion Gap, Serum: 5 mmol/L    Blood Urea Nitrogen, Serum: 12 mg/dL    Creatinine, Serum: 0.79 mg/dL    Glucose, Serum: 96 mg/dL    Calcium, Total Serum: 8.7 mg/dL    Urine Studies:    Chloride, Random Urine: 43 mmol/L (06-19 @ 15:40)  Creatinine, Random Urine: 19 mg/dL (06-19 @ 15:40)  Sodium, Random Urine: 39 mmol/L (06-19 @ 15:40)  Osmolality, Random Urine: 194 mos/kg (06-19 @ 15:40)        RADIOLOGY & ADDITIONAL STUDIES:    EXAM:  CHEST SINGLE VIEW FRONTAL                            PROCEDURE DATE:  06/17/2017        INTERPRETATION:  History: Cough.    Chest:  one view.      Comparison: 2/20/2014    AP radiograph of the chest demonstrates mild atelectasis or infiltrate in   the LEFT lower lobe. The cardiac silhouette is normal in size. Osseous   structures are intact.    Impression:mild atelectasis or infiltrate in the LEFT lower lobe.
Patient is a 83y Female, no events reported.     REVIEW OF SYSTEMS:    UTO dementia    MEDICATIONS  (STANDING):  mirtazapine 15milliGRAM(s) Oral at bedtime  simvastatin 10milliGRAM(s) Oral at bedtime  clopidogrel Tablet 75milliGRAM(s) Oral daily  metoprolol 12.5milliGRAM(s) Oral two times a day  memantine 10milliGRAM(s) Oral two times a day  calcium carbonate 1250 mG + Vitamin D (OsCal 500 + D) 1Tablet(s) Oral two times a day  insulin lispro (HumaLOG) corrective regimen sliding scale  SubCutaneous three times a day before meals  dextrose 5%. 1000milliLiter(s) IV Continuous <Continuous>  dextrose 50% Injectable 12.5Gram(s) IV Push once  dextrose 50% Injectable 25Gram(s) IV Push once  dextrose 50% Injectable 25Gram(s) IV Push once  docusate sodium 200milliGRAM(s) Oral daily  cefepime  IVPB 1000milliGRAM(s) IV Intermittent every 12 hours  vancomycin  IVPB  IV Intermittent   vancomycin  IVPB 500milliGRAM(s) IV Intermittent every 12 hours  diltiazem    Tablet 60milliGRAM(s) Oral every 6 hours  desmopressin Injectable 1MICROGram(s) SubCutaneous at bedtime  insulin glargine Injectable (LANTUS) 20Unit(s) SubCutaneous at bedtime  aspirin  chewable 81milliGRAM(s) Oral daily  heparin  Injectable 5000Unit(s) SubCutaneous every 12 hours    MEDICATIONS  (PRN):  dextrose Gel 1Dose(s) Oral once PRN Blood Glucose LESS THAN 70 milliGRAM(s)/deciliter  glucagon  Injectable 1milliGRAM(s) IntraMuscular once PRN Glucose LESS THAN 70 milligrams/deciliter        T(C): , Max: 36.8 (06-23 @ 20:55)  T(F): , Max: 98.2 (06-23 @ 20:55)  HR: 83  BP: 132/85  BP(mean): --  RR: 18  SpO2: 100%  Wt(kg): --    I & Os for current day (as of 06-24 @ 10:59)  =============================================  IN: 200 ml / OUT: 0 ml / NET: 200 ml        PHYSICAL EXAM:    Constitutional: frail  HEENT: PERRLA, EOMI,  MMM  Neck: No LAD, No JVD  Respiratory: CTAB  Cardiovascular: S1 and S2, RRR  Gastrointestinal: BS+, soft, NT/ND  Extremities: No peripheral edema, Le amp  Neurological: Arousable but aslee  : No Lopez  Skin: No rashes  Access: Not applicable        LABS:    24 Jun 2017 05:33    145    |  109    |  14     ----------------------------<  167    4.1     |  31     |  0.70   23 Jun 2017 15:57    145    |  109    |  13     ----------------------------<  157    4.0     |  32     |  0.78   22 Jun 2017 14:48    145    |  x      |  x      ----------------------------<  x      x       |  x      |  x      22 Jun 2017 05:43    145    |  113    |  12     ----------------------------<  178    4.0     |  27     |  0.75   21 Jun 2017 16:36    150    |  114    |  9      ----------------------------<  94     3.7     |  31     |  0.74     Ca    9.0        24 Jun 2017 05:33  Ca    8.9        23 Jun 2017 15:57  Ca    8.9        22 Jun 2017 05:43  Ca    9.0        21 Jun 2017 16:36  Ca    8.8        21 Jun 2017 04:32            Urine Studies:          RADIOLOGY & ADDITIONAL STUDIES:
CHIEF COMPLAINT: lethargy, cough    SUBJECTIVE: No O/N events. alert, follows simple commands.    REVIEW OF SYSTEMS:  uto d/t baseline dementia    Vital Signs Last 24 Hrs  T(C): 37.1 (27 Jun 2017 11:30), Max: 37.1 (27 Jun 2017 11:30)  T(F): 98.8 (27 Jun 2017 11:30), Max: 98.8 (27 Jun 2017 11:30)  HR: 68 (27 Jun 2017 11:30) (65 - 68)  BP: 135/59 (27 Jun 2017 11:30) (134/42 - 151/63)  BP(mean): --  RR: 18 (27 Jun 2017 11:30) (16 - 19)  SpO2: 100% (27 Jun 2017 11:30) (97% - 100%)    CAPILLARY BLOOD GLUCOSE  115 (27 Jun 2017 11:30)  194 (27 Jun 2017 08:45)  156 (26 Jun 2017 22:41)  130 (26 Jun 2017 17:05)    PHYSICAL EXAM:  GENERAL: sleeping but arousable. minimally verbal.   HEAD:  Atraumatic, Normocephalic  EYES: EOMI, PERRLA  HEENT:dry mucous membranes  NECK: Supple, No JVD  NERVOUS SYSTEM: arousable. does not follow commands.   CHEST/LUNG: decreased bs at bases.  HEART: Regular rate and rhythm  ABDOMEN: Soft, Nontender, Nondistended; Bowel sounds present  GENITOURINARY- Voiding, no palpable bladder  EXTREMITIES:  No clubbing, cyanosis, or edema, L bka  MUSCULOSKELTAL- No muscle tenderness, No joint tenderness  SKIN-no rash    MEDICATIONS:  MEDICATIONS  (STANDING):  mirtazapine 15 milliGRAM(s) Oral at bedtime  simvastatin 10 milliGRAM(s) Oral at bedtime  clopidogrel Tablet 75 milliGRAM(s) Oral daily  metoprolol 12.5 milliGRAM(s) Oral two times a day  memantine 10 milliGRAM(s) Oral two times a day  calcium carbonate 1250 mG + Vitamin D (OsCal 500 + D) 1 Tablet(s) Oral two times a day  insulin lispro (HumaLOG) corrective regimen sliding scale   SubCutaneous three times a day before meals  dextrose 5%. 1000 milliLiter(s) (50 mL/Hr) IV Continuous <Continuous>  dextrose 50% Injectable 12.5 Gram(s) IV Push once  dextrose 50% Injectable 25 Gram(s) IV Push once  dextrose 50% Injectable 25 Gram(s) IV Push once  docusate sodium 200 milliGRAM(s) Oral daily  cefepime  IVPB 1000 milliGRAM(s) IV Intermittent every 12 hours  vancomycin  IVPB   IV Intermittent   vancomycin  IVPB 500 milliGRAM(s) IV Intermittent every 12 hours  diltiazem    Tablet 60 milliGRAM(s) Oral every 6 hours  desmopressin Injectable 1 MICROGram(s) SubCutaneous at bedtime  insulin glargine Injectable (LANTUS) 20 Unit(s) SubCutaneous at bedtime  aspirin  chewable 81 milliGRAM(s) Oral daily  heparin  Injectable 5000 Unit(s) SubCutaneous every 12 hours    LABS: All Labs Reviewed:                        12.1   6.7   )-----------( 136      ( 26 Jun 2017 05:53 )             37.6     06-26    140  |  107  |  12  ----------------------------<  112<H>  4.2   |  31  |  0.73    Ca    9.2      26 Jun 2017 05:53  Phos  2.9     06-26  Mg     2.2     06-26
COVERING FOR DR. SOL 6/25    Patient is a 83y Female, no events reported.     REVIEW OF SYSTEMS:    UTO dementia    MEDICATIONS  (STANDING):  mirtazapine 15milliGRAM(s) Oral at bedtime  simvastatin 10milliGRAM(s) Oral at bedtime  clopidogrel Tablet 75milliGRAM(s) Oral daily  metoprolol 12.5milliGRAM(s) Oral two times a day  memantine 10milliGRAM(s) Oral two times a day  calcium carbonate 1250 mG + Vitamin D (OsCal 500 + D) 1Tablet(s) Oral two times a day  insulin lispro (HumaLOG) corrective regimen sliding scale  SubCutaneous three times a day before meals  dextrose 5%. 1000milliLiter(s) IV Continuous <Continuous>  dextrose 50% Injectable 12.5Gram(s) IV Push once  dextrose 50% Injectable 25Gram(s) IV Push once  dextrose 50% Injectable 25Gram(s) IV Push once  docusate sodium 200milliGRAM(s) Oral daily  cefepime  IVPB 1000milliGRAM(s) IV Intermittent every 12 hours  vancomycin  IVPB  IV Intermittent   vancomycin  IVPB 500milliGRAM(s) IV Intermittent every 12 hours  diltiazem    Tablet 60milliGRAM(s) Oral every 6 hours  desmopressin Injectable 1MICROGram(s) SubCutaneous at bedtime  insulin glargine Injectable (LANTUS) 20Unit(s) SubCutaneous at bedtime  aspirin  chewable 81milliGRAM(s) Oral daily  heparin  Injectable 5000Unit(s) SubCutaneous every 12 hours      Vital Signs Last 24 Hrs  T(C): 36.9, Max: 36.9 (06-24 @ 11:51)  T(F): 98.4, Max: 98.4 (06-24 @ 11:51)  HR: 64 (64 - 80)  BP: 150/60 (126/42 - 150/60)  BP(mean): --  RR: 18 (18 - 18)  SpO2: 100% (98% - 100%)  Daily     Daily   I&O's Summary    I & Os for current day (as of 25 Jun 2017 11:24)  =============================================  IN: 60 ml / OUT: 0 ml / NET: 60 ml    I & Os for current day (as of 06-24 @ 10:59)  =============================================  IN: 200 ml / OUT: 0 ml / NET: 200 ml        PHYSICAL EXAM:    Constitutional: frail  HEENT: PERRLA, EOMI,  MMM  Neck: No LAD, No JVD  Respiratory: CTAB, no wheeze  Cardiovascular: S1 and S2, RRR  Gastrointestinal: BS+, soft, NT/ND  Extremities: No peripheral edema, RLE amp  Neurological: Arousable but asleep  : No Lopez  Skin: No rashes  Access: Not applicable        LABS:  06-25    140  |  107  |  13  ----------------------------<  101<H>  4.0   |  28  |  0.63    Ca    8.9      25 Jun 2017 07:08    24 Jun 2017 05:33    145    |  109    |  14     ----------------------------<  167    4.1     |  31     |  0.70   23 Jun 2017 15:57    145    |  109    |  13     ----------------------------<  157    4.0     |  32     |  0.78   22 Jun 2017 14:48    145    |  x      |  x      ----------------------------<  x      x       |  x      |  x      22 Jun 2017 05:43    145    |  113    |  12     ----------------------------<  178    4.0     |  27     |  0.75   21 Jun 2017 16:36    150    |  114    |  9      ----------------------------<  94     3.7     |  31     |  0.74     Ca    9.0        24 Jun 2017 05:33  Ca    8.9        23 Jun 2017 15:57  Ca    8.9        22 Jun 2017 05:43  Ca    9.0        21 Jun 2017 16:36  Ca    8.8        21 Jun 2017 04:32            Urine Studies:          RADIOLOGY & ADDITIONAL STUDIES:
Notified by RN patient with gram positive cocci in clusters in blood culture. 1 dose vancomycin 1g given stat, follow-up ID recommendations.
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     REVIEW OF SYSTEMS:    UTO, dementia    MEDICATIONS  (STANDING):  aspirin enteric coated 81milliGRAM(s) Oral daily  mirtazapine 15milliGRAM(s) Oral at bedtime  simvastatin 10milliGRAM(s) Oral at bedtime  clopidogrel Tablet 75milliGRAM(s) Oral daily  metoprolol 12.5milliGRAM(s) Oral two times a day  memantine 10milliGRAM(s) Oral two times a day  potassium chloride    Tablet ER 10milliEquivalent(s) Oral daily  calcium carbonate 1250 mG + Vitamin D (OsCal 500 + D) 1Tablet(s) Oral two times a day  heparin  Injectable 5000Unit(s) SubCutaneous every 8 hours  insulin glargine Injectable (LANTUS) 35Unit(s) SubCutaneous at bedtime  insulin lispro (HumaLOG) corrective regimen sliding scale  SubCutaneous three times a day before meals  dextrose 5%. 1000milliLiter(s) IV Continuous <Continuous>  dextrose 50% Injectable 12.5Gram(s) IV Push once  dextrose 50% Injectable 25Gram(s) IV Push once  dextrose 50% Injectable 25Gram(s) IV Push once  docusate sodium 200milliGRAM(s) Oral daily  cefepime  IVPB 1000milliGRAM(s) IV Intermittent every 12 hours  dextrose 5% 1000milliLiter(s) IV Continuous <Continuous>  vancomycin  IVPB  IV Intermittent   vancomycin  IVPB 500milliGRAM(s) IV Intermittent every 12 hours  diltiazem    Tablet 60milliGRAM(s) Oral every 6 hours    MEDICATIONS  (PRN):  dextrose Gel 1Dose(s) Oral once PRN Blood Glucose LESS THAN 70 milliGRAM(s)/deciliter  glucagon  Injectable 1milliGRAM(s) IntraMuscular once PRN Glucose LESS THAN 70 milligrams/deciliter        T(C): , Max: 37.2 (06-19 @ 16:50)  T(F): , Max: 98.9 (06-19 @ 16:50)  HR: 68  BP: 142/49  BP(mean): --  RR: 18  SpO2: 99%  Wt(kg): --        PHYSICAL EXAM:    Constitutional: NAD  HEENT: PERRLA, EOMI,  MMM  Neck: No LAD, No JVD  Respiratory: CTAB  Cardiovascular: S1 and S2, RRR  Gastrointestinal: BS+, soft, NT/ND  Extremities: No peripheral edema  Neurological: Alert  Psychiatric: demented  : No Lopez  Skin: No rashes  Access: Not applicable        LABS:                        12.2   9.3   )-----------( 112      ( 19 Jun 2017 06:42 )             39.1     20 Jun 2017 05:44    150    |  117    |  16     ----------------------------<  224    4.1     |  27     |  0.90   19 Jun 2017 06:42    157    |  124    |  12     ----------------------------<  96     3.4     |  28     |  0.79   18 Jun 2017 19:35    160    |  127    |  16     ----------------------------<  99     3.4     |  28     |  0.73   18 Jun 2017 12:18    159    |  128    |  15     ----------------------------<  118    3.4     |  30     |  0.68   18 Jun 2017 06:12    162    |  127    |  16     ----------------------------<  148    3.6     |  32     |  0.81     Ca    8.7        20 Jun 2017 05:44  Ca    8.7        19 Jun 2017 06:42  Ca    8.8        18 Jun 2017 19:35  Ca    9.0        18 Jun 2017 12:18  Ca    8.8        18 Jun 2017 06:12  Phos  2.3       19 Jun 2017 06:42  Mg     2.1       19 Jun 2017 06:42  Mg     2.2       17 Jun 2017 17:47    TPro  6.7    /  Alb  2.7    /  TBili  0.4    /  DBili  x      /  AST  14     /  ALT  11     /  AlkPhos  113    17 Jun 2017 17:47          Urine Studies:          RADIOLOGY & ADDITIONAL STUDIES:
Patient is a 83y Female w no events reported    REVIEW OF SYSTEMS:    UTO dementia    MEDICATIONS  (STANDING):  mirtazapine 15 milliGRAM(s) Oral at bedtime  simvastatin 10 milliGRAM(s) Oral at bedtime  clopidogrel Tablet 75 milliGRAM(s) Oral daily  metoprolol 12.5 milliGRAM(s) Oral two times a day  memantine 10 milliGRAM(s) Oral two times a day  calcium carbonate 1250 mG + Vitamin D (OsCal 500 + D) 1 Tablet(s) Oral two times a day  insulin lispro (HumaLOG) corrective regimen sliding scale   SubCutaneous three times a day before meals  dextrose 5%. 1000 milliLiter(s) (50 mL/Hr) IV Continuous <Continuous>  dextrose 50% Injectable 12.5 Gram(s) IV Push once  dextrose 50% Injectable 25 Gram(s) IV Push once  dextrose 50% Injectable 25 Gram(s) IV Push once  docusate sodium 200 milliGRAM(s) Oral daily  cefepime  IVPB 1000 milliGRAM(s) IV Intermittent every 12 hours  vancomycin  IVPB   IV Intermittent   vancomycin  IVPB 500 milliGRAM(s) IV Intermittent every 12 hours  diltiazem    Tablet 60 milliGRAM(s) Oral every 6 hours  desmopressin Injectable 1 MICROGram(s) SubCutaneous at bedtime  insulin glargine Injectable (LANTUS) 20 Unit(s) SubCutaneous at bedtime  aspirin  chewable 81 milliGRAM(s) Oral daily  heparin  Injectable 5000 Unit(s) SubCutaneous every 12 hours    MEDICATIONS  (PRN):  dextrose Gel 1 Dose(s) Oral once PRN Blood Glucose LESS THAN 70 milliGRAM(s)/deciliter  glucagon  Injectable 1 milliGRAM(s) IntraMuscular once PRN Glucose LESS THAN 70 milligrams/deciliter        T(C): , Max: 37.1 (06-27-17 @ 11:30)  T(F): , Max: 98.8 (06-27-17 @ 11:30)  HR: 68 (06-27-17 @ 11:30)  BP: 135/59 (06-27-17 @ 11:30)  BP(mean): --  RR: 18 (06-27-17 @ 11:30)  SpO2: 100% (06-27-17 @ 11:30)  Wt(kg): --    06-26 @ 07:01  -  06-27 @ 07:00  --------------------------------------------------------  IN: 160 mL / OUT: 2 mL / NET: 158 mL          PHYSICAL EXAM:    Constitutional: NAD,  HEENT: PERRLA, EOMI,  MMM  Neck: No LAD, No JVD  Respiratory: CTAB  Cardiovascular: S1 and S2, RRR  Gastrointestinal: BS+, soft, NT/ND  Extremities: amp  Neurological: Arousable  : No Lopez        LABS:                        12.1   6.7   )-----------( 136      ( 26 Jun 2017 05:53 )             37.6     26 Jun 2017 05:53    140    |  107    |  12     ----------------------------<  112    4.2     |  31     |  0.73   25 Jun 2017 07:08    140    |  107    |  13     ----------------------------<  101    4.0     |  28     |  0.63   24 Jun 2017 05:33    145    |  109    |  14     ----------------------------<  167    4.1     |  31     |  0.70   23 Jun 2017 15:57    145    |  109    |  13     ----------------------------<  157    4.0     |  32     |  0.78     Ca    9.2        26 Jun 2017 05:53  Ca    8.9        25 Jun 2017 07:08  Ca    9.0        24 Jun 2017 05:33  Ca    8.9        23 Jun 2017 15:57  Phos  2.9       26 Jun 2017 05:53  Mg     2.2       26 Jun 2017 05:53            Urine Studies:          RADIOLOGY & ADDITIONAL STUDIES:
Patient is a 83y Female who reports no events reported.     REVIEW OF SYSTEMS:    UTO secondary to dementia    MEDICATIONS  (STANDING):  mirtazapine 15milliGRAM(s) Oral at bedtime  simvastatin 10milliGRAM(s) Oral at bedtime  clopidogrel Tablet 75milliGRAM(s) Oral daily  metoprolol 12.5milliGRAM(s) Oral two times a day  memantine 10milliGRAM(s) Oral two times a day  calcium carbonate 1250 mG + Vitamin D (OsCal 500 + D) 1Tablet(s) Oral two times a day  insulin lispro (HumaLOG) corrective regimen sliding scale  SubCutaneous three times a day before meals  dextrose 5%. 1000milliLiter(s) IV Continuous <Continuous>  dextrose 50% Injectable 12.5Gram(s) IV Push once  dextrose 50% Injectable 25Gram(s) IV Push once  dextrose 50% Injectable 25Gram(s) IV Push once  docusate sodium 200milliGRAM(s) Oral daily  cefepime  IVPB 1000milliGRAM(s) IV Intermittent every 12 hours  vancomycin  IVPB  IV Intermittent   vancomycin  IVPB 500milliGRAM(s) IV Intermittent every 12 hours  diltiazem    Tablet 60milliGRAM(s) Oral every 6 hours  desmopressin Injectable 1MICROGram(s) SubCutaneous at bedtime  insulin glargine Injectable (LANTUS) 20Unit(s) SubCutaneous at bedtime  aspirin  chewable 81milliGRAM(s) Oral daily  heparin  Injectable 5000Unit(s) SubCutaneous every 12 hours    MEDICATIONS  (PRN):  dextrose Gel 1Dose(s) Oral once PRN Blood Glucose LESS THAN 70 milliGRAM(s)/deciliter  glucagon  Injectable 1milliGRAM(s) IntraMuscular once PRN Glucose LESS THAN 70 milligrams/deciliter        T(C): , Max: 36.9 (06-23 @ 05:04)  T(F): , Max: 98.4 (06-23 @ 05:04)  HR: 66  BP: 130/49  BP(mean): --  RR: 18  SpO2: 99%  Wt(kg): --    I & Os for current day (as of 06-23 @ 16:59)  =============================================  IN: 510 ml / OUT: 0 ml / NET: 510 ml        PHYSICAL EXAM:    Constitutional:frail  HEENT: PERRLA, EOMI,  MMM  Neck: No LAD, No JVD  Respiratory: CTAB  Cardiovascular: S1 and S2, RRR  Gastrointestinal: BS+, soft, NT/ND  Extremities: No peripheral edema, amp L  Neurological: Asleep but arousable  : No Lopez  Skin: No rashes  Access: Not applicable        LABS:                        12.4   6.8   )-----------( 130      ( 22 Jun 2017 05:43 )             39.7     23 Jun 2017 15:57    145    |  109    |  13     ----------------------------<  157    4.0     |  32     |  0.78   22 Jun 2017 14:48    145    |  x      |  x      ----------------------------<  x      x       |  x      |  x      22 Jun 2017 05:43    145    |  113    |  12     ----------------------------<  178    4.0     |  27     |  0.75   21 Jun 2017 16:36    150    |  114    |  9      ----------------------------<  94     3.7     |  31     |  0.74   21 Jun 2017 04:32    153    |  119    |  11     ----------------------------<  127    4.0     |  28     |  0.69     Ca    8.9        23 Jun 2017 15:57  Ca    8.9        22 Jun 2017 05:43  Ca    9.0        21 Jun 2017 16:36  Ca    8.8        21 Jun 2017 04:32  Ca    8.7        20 Jun 2017 05:44            Urine Studies:          RADIOLOGY & ADDITIONAL STUDIES:
Patient is a 83y Female with no events.     REVIEW OF SYSTEMS:    UTO secondary to dementia    MEDICATIONS  (STANDING):  mirtazapine 15milliGRAM(s) Oral at bedtime  simvastatin 10milliGRAM(s) Oral at bedtime  clopidogrel Tablet 75milliGRAM(s) Oral daily  metoprolol 12.5milliGRAM(s) Oral two times a day  memantine 10milliGRAM(s) Oral two times a day  calcium carbonate 1250 mG + Vitamin D (OsCal 500 + D) 1Tablet(s) Oral two times a day  insulin lispro (HumaLOG) corrective regimen sliding scale  SubCutaneous three times a day before meals  dextrose 5%. 1000milliLiter(s) IV Continuous <Continuous>  dextrose 50% Injectable 12.5Gram(s) IV Push once  dextrose 50% Injectable 25Gram(s) IV Push once  dextrose 50% Injectable 25Gram(s) IV Push once  docusate sodium 200milliGRAM(s) Oral daily  cefepime  IVPB 1000milliGRAM(s) IV Intermittent every 12 hours  vancomycin  IVPB  IV Intermittent   vancomycin  IVPB 500milliGRAM(s) IV Intermittent every 12 hours  diltiazem    Tablet 60milliGRAM(s) Oral every 6 hours  desmopressin Injectable 1MICROGram(s) SubCutaneous at bedtime  insulin glargine Injectable (LANTUS) 20Unit(s) SubCutaneous at bedtime  aspirin  chewable 81milliGRAM(s) Oral daily  heparin  Injectable 5000Unit(s) SubCutaneous every 12 hours    MEDICATIONS  (PRN):  dextrose Gel 1Dose(s) Oral once PRN Blood Glucose LESS THAN 70 milliGRAM(s)/deciliter  glucagon  Injectable 1milliGRAM(s) IntraMuscular once PRN Glucose LESS THAN 70 milligrams/deciliter        T(C): , Max: 36.7 (06-25 @ 21:44)  T(F): , Max: 98 (06-25 @ 21:44)  HR: 86  BP: 147/78  BP(mean): --  RR: 16  SpO2: 99%  Wt(kg): --  I & Os for 24h ending 06-26 @ 07:00  =============================================  IN: 60 ml / OUT: 0 ml / NET: 60 ml    I & Os for current day (as of 06-26 @ 16:45)  =============================================  IN: 110 ml / OUT: 2 ml / NET: 108 ml        PHYSICAL EXAM:    Constitutional: frail  HEENT: PERRLA, EOMI,  MMM  Neck: No LAD, No JVD  Respiratory: dist bs  Cardiovascular: S1 and S2, RRR  Gastrointestinal: BS+, soft, NT/ND  Extremities: No peripheral edema  Neurological: Arousable  : No Lopez          LABS:                        12.1   6.7   )-----------( 136      ( 26 Jun 2017 05:53 )             37.6     26 Jun 2017 05:53    140    |  107    |  12     ----------------------------<  112    4.2     |  31     |  0.73   25 Jun 2017 07:08    140    |  107    |  13     ----------------------------<  101    4.0     |  28     |  0.63   24 Jun 2017 05:33    145    |  109    |  14     ----------------------------<  167    4.1     |  31     |  0.70   23 Jun 2017 15:57    145    |  109    |  13     ----------------------------<  157    4.0     |  32     |  0.78     Ca    9.2        26 Jun 2017 05:53  Ca    8.9        25 Jun 2017 07:08  Ca    9.0        24 Jun 2017 05:33  Ca    8.9        23 Jun 2017 15:57  Phos  2.9       26 Jun 2017 05:53  Mg     2.2       26 Jun 2017 05:53            Urine Studies:          RADIOLOGY & ADDITIONAL STUDIES:
Patient is a 83y Female with no new events. Intake limited    REVIEW OF SYSTEMS:    UTO due to dementia    MEDICATIONS  (STANDING):  aspirin enteric coated 81milliGRAM(s) Oral daily  mirtazapine 15milliGRAM(s) Oral at bedtime  simvastatin 10milliGRAM(s) Oral at bedtime  clopidogrel Tablet 75milliGRAM(s) Oral daily  metoprolol 12.5milliGRAM(s) Oral two times a day  memantine 10milliGRAM(s) Oral two times a day  calcium carbonate 1250 mG + Vitamin D (OsCal 500 + D) 1Tablet(s) Oral two times a day  heparin  Injectable 5000Unit(s) SubCutaneous every 8 hours  insulin glargine Injectable (LANTUS) 35Unit(s) SubCutaneous at bedtime  insulin lispro (HumaLOG) corrective regimen sliding scale  SubCutaneous three times a day before meals  dextrose 5%. 1000milliLiter(s) IV Continuous <Continuous>  dextrose 50% Injectable 12.5Gram(s) IV Push once  dextrose 50% Injectable 25Gram(s) IV Push once  dextrose 50% Injectable 25Gram(s) IV Push once  docusate sodium 200milliGRAM(s) Oral daily  cefepime  IVPB 1000milliGRAM(s) IV Intermittent every 12 hours  vancomycin  IVPB  IV Intermittent   vancomycin  IVPB 500milliGRAM(s) IV Intermittent every 12 hours  diltiazem    Tablet 60milliGRAM(s) Oral every 6 hours  dextrose 5% 1000milliLiter(s) IV Continuous <Continuous>  potassium chloride   Powder 10milliEquivalent(s) Oral daily    MEDICATIONS  (PRN):  dextrose Gel 1Dose(s) Oral once PRN Blood Glucose LESS THAN 70 milliGRAM(s)/deciliter  glucagon  Injectable 1milliGRAM(s) IntraMuscular once PRN Glucose LESS THAN 70 milligrams/deciliter        T(C): , Max: 37.6 (06-20 @ 22:01)  T(F): , Max: 99.6 (06-20 @ 22:01)  HR: 63  BP: 133/56  BP(mean): --  RR: 17  SpO2: 98%  Wt(kg): --    I & Os for current day (as of 06-21 @ 12:28)  =============================================  IN: 1000 ml / OUT: 0 ml / NET: 1000 ml        PHYSICAL EXAM:    Constitutional: frail  HEENT: PERRLA, EOMI,  MMM  Neck: No LAD, No JVD  Respiratory: distant  Cardiovascular: S1 and S2, RRR  Gastrointestinal: BS+, soft, NT/ND  Extremities: No peripheral edema  Neurological: Alert, not following command  : No Lopez  Skin: No rashes  Access: Not applicable        LABS:    21 Jun 2017 04:32    153    |  119    |  11     ----------------------------<  127    4.0     |  28     |  0.69   20 Jun 2017 05:44    150    |  117    |  16     ----------------------------<  224    4.1     |  27     |  0.90   19 Jun 2017 06:42    157    |  124    |  12     ----------------------------<  96     3.4     |  28     |  0.79   18 Jun 2017 19:35    160    |  127    |  16     ----------------------------<  99     3.4     |  28     |  0.73   18 Jun 2017 12:18    159    |  128    |  15     ----------------------------<  118    3.4     |  30     |  0.68     Ca    8.8        21 Jun 2017 04:32  Ca    8.7        20 Jun 2017 05:44  Ca    8.7        19 Jun 2017 06:42  Ca    8.8        18 Jun 2017 19:35  Ca    9.0        18 Jun 2017 12:18  Phos  2.3       19 Jun 2017 06:42  Mg     2.1       19 Jun 2017 06:42  Mg     2.2       17 Jun 2017 17:47    TPro  6.7    /  Alb  2.7    /  TBili  0.4    /  DBili  x      /  AST  14     /  ALT  11     /  AlkPhos  113    17 Jun 2017 17:47          Urine Studies:    Chloride, Random Urine: 43 mmol/L (06-19 @ 15:40)  Creatinine, Random Urine: 19 mg/dL (06-19 @ 15:40)  Sodium, Random Urine: 39 mmol/L (06-19 @ 15:40)  Osmolality, Random Urine: 194 mos/kg (06-19 @ 15:40)        RADIOLOGY & ADDITIONAL STUDIES:
Patient is a 83y old  Female who presents with a chief complaint of sob (2017 01:00)    HPI:  82 y/o Female with h/o DM Type II, PVD s/p Left  BKA, dementia, Afib, Major depression, HTN was admitted on  for lethargy, cough. She is reported with dry, nonproductive cough X 2 weeks. She is alert at baseline but not oriented. Reported with a nonbilious vomiting episode the day prior to admission, for which she received IVF in the facility . She was brought to ED today because family concerned about patient being more lethargic. In ED : given Vanco  1 gm + Cefepime 1gm.     Lying in bed in NAD  Confused  Dose not seem in pain    MEDICATIONS  (STANDING):  aspirin enteric coated 81milliGRAM(s) Oral daily  diltiazem   CD 300milliGRAM(s) Oral daily  mirtazapine 15milliGRAM(s) Oral at bedtime  simvastatin 10milliGRAM(s) Oral at bedtime  clopidogrel Tablet 75milliGRAM(s) Oral daily  metoprolol 12.5milliGRAM(s) Oral two times a day  memantine 10milliGRAM(s) Oral two times a day  potassium chloride    Tablet ER 10milliEquivalent(s) Oral daily  calcium carbonate 1250 mG + Vitamin D (OsCal 500 + D) 1Tablet(s) Oral two times a day  heparin  Injectable 5000Unit(s) SubCutaneous every 8 hours  insulin glargine Injectable (LANTUS) 35Unit(s) SubCutaneous at bedtime  insulin lispro (HumaLOG) corrective regimen sliding scale  SubCutaneous three times a day before meals  dextrose 5%. 1000milliLiter(s) IV Continuous <Continuous>  dextrose 50% Injectable 12.5Gram(s) IV Push once  dextrose 50% Injectable 25Gram(s) IV Push once  dextrose 50% Injectable 25Gram(s) IV Push once  docusate sodium 200milliGRAM(s) Oral daily  cefepime  IVPB 1000milliGRAM(s) IV Intermittent every 12 hours  dextrose 5% 1000milliLiter(s) IV Continuous <Continuous>    MEDICATIONS  (PRN):  dextrose Gel 1Dose(s) Oral once PRN Blood Glucose LESS THAN 70 milliGRAM(s)/deciliter  glucagon  Injectable 1milliGRAM(s) IntraMuscular once PRN Glucose LESS THAN 70 milligrams/deciliter      Vital Signs Last 24 Hrs  T(C): 36.2, Max: 37.1 ( @ 16:00)  T(F): 97.2, Max: 98.8 ( @ 16:00)  HR: 70 (61 - 70)  BP: 120/89 (111/44 - 150/57)  BP(mean): --  RR: --  SpO2: 100% (92% - 100%)    Physical Exam:      Constitutional: frail looking  HEENT: NC/AT, EOMI, PERRLA  Neck: supple  Back: no tenderness  Respiratory: crackles at bases  Cardiovascular: S1S2 regular, no murmurs  Abdomen: soft, not tender, not distended, positive BS  Genitourinary: deferred  Rectal: deferred  Musculoskeletal: no muscle tenderness, no joint swelling or tenderness  Extremities: no pedal edema  Neurological: confused, moving all extremities, no focal deficits  Skin: no rashes    Labs:                        12.2   9.3   )-----------( 112      ( 2017 06:42 )             39.1     -    157<H>  |  124<H>  |  12  ----------------------------<  96  3.4<L>   |  28  |  0.79    Ca    8.7      2017 06:42  Phos  2.3     -  Mg     2.1         TPro  6.7  /  Alb  2.7<L>  /  TBili  0.4  /  DBili  x   /  AST  14<L>  /  ALT  11<L>  /  AlkPhos  113             Cultures:                         14.1   11.1  )-----------( 156      ( 2017 17:47 )             45.2     06-17    148<H>  |  115<H>  |  16  ----------------------------<  512<HH>  3.2<L>   |  27  |  1.02    Ca    8.5      2017 21:38  Mg     2.2     -    TPro  6.7  /  Alb  2.7<L>  /  TBili  0.4  /  DBili  x   /  AST  14<L>  /  ALT  11<L>  /  AlkPhos  113  -     LIVER FUNCTIONS - ( 2017 17:47 )  Alb: 2.7 g/dL / Pro: 6.7 gm/dL / ALK PHOS: 113 U/L / ALT: 11 U/L / AST: 14 U/L / GGT: x           Urinalysis Basic - ( 2017 21:37 )    Color: Yellow / Appearance: very cloudy / S.025 / pH: x  Gluc: x / Ketone: Negative  / Bili: Negative / Urobili: Negative mg/dL   Blood: x / Protein: 30 mg/dL / Nitrite: Negative   Leuk Esterase: Moderate / RBC: 25-50 /HPF / WBC >50   Sq Epi: x / Non Sq Epi: Few / Bacteria: Moderate    Culture - Blood (17 @ 19:26)    Gram Stain:   Growth in anaerobic bottle: Gram Positive Cocci in Clusters    Specimen Source: .Blood None    Culture Results:   Growth in anaerobic bottle: Gram Positive Cocci in Clusters    Culture - Blood (17 @ 19:26)    Gram Stain:   Growth in aerobic bottle: Gram Positive Cocci in Clusters    Specimen Source: .Blood None    Culture Results:   Growth in aerobic bottle: Gram Positive Cocci in Clusters        Radiology:    CT chest:  Patchy airspace opacities at the lung bases which may be infectious in  etiology. Follow-up is suggested until resolution.  Indeterminate 1 cm exophytic lesion along the interpolar left kidney.   Ultrasound suggested for further evaluation.  4 mm pulmonary nodule in the right upper lobe.      Advanced directives addressed: full resuscitation
Patient is a 83y old  Female who presents with a chief complaint of sob (2017 01:00)    HPI:  82 y/o Female with h/o DM Type II, PVD s/p Left  BKA, dementia, Afib, Major depression, HTN was admitted on  for lethargy, cough. She is reported with dry, nonproductive cough X 2 weeks. She is alert at baseline but not oriented. Reported with a nonbilious vomiting episode the day prior to admission, for which she received IVF in the facility . She was brought to ED today because family concerned about patient being more lethargic. In ED : given Vanco  1 gm + Cefepime 1gm.     Lying in bed in NAD  Confused  Dose not seem in pain  Alert  Appetite is improved  No fever or chills    MEDICATIONS  (STANDING):  mirtazapine 15milliGRAM(s) Oral at bedtime  simvastatin 10milliGRAM(s) Oral at bedtime  clopidogrel Tablet 75milliGRAM(s) Oral daily  metoprolol 12.5milliGRAM(s) Oral two times a day  memantine 10milliGRAM(s) Oral two times a day  calcium carbonate 1250 mG + Vitamin D (OsCal 500 + D) 1Tablet(s) Oral two times a day  insulin lispro (HumaLOG) corrective regimen sliding scale  SubCutaneous three times a day before meals  dextrose 5%. 1000milliLiter(s) IV Continuous <Continuous>  dextrose 50% Injectable 12.5Gram(s) IV Push once  dextrose 50% Injectable 25Gram(s) IV Push once  dextrose 50% Injectable 25Gram(s) IV Push once  docusate sodium 200milliGRAM(s) Oral daily  cefepime  IVPB 1000milliGRAM(s) IV Intermittent every 12 hours  vancomycin  IVPB  IV Intermittent   vancomycin  IVPB 500milliGRAM(s) IV Intermittent every 12 hours  diltiazem    Tablet 60milliGRAM(s) Oral every 6 hours  desmopressin Injectable 1MICROGram(s) SubCutaneous at bedtime  insulin glargine Injectable (LANTUS) 20Unit(s) SubCutaneous at bedtime  aspirin  chewable 81milliGRAM(s) Oral daily  heparin  Injectable 5000Unit(s) SubCutaneous every 12 hours    MEDICATIONS  (PRN):  dextrose Gel 1Dose(s) Oral once PRN Blood Glucose LESS THAN 70 milliGRAM(s)/deciliter  glucagon  Injectable 1milliGRAM(s) IntraMuscular once PRN Glucose LESS THAN 70 milligrams/deciliter      Vital Signs Last 24 Hrs  T(C): 36.4, Max: 36.9 ( @ 05:04)  T(F): 97.6, Max: 98.4 ( @ 05:04)  HR: 66 (64 - 68)  BP: 130/49 (123/92 - 148/73)  BP(mean): --  RR: 18 (17 - 18)  SpO2: 99% (98% - 99%)    Physical Exam:        Constitutional: frail looking  HEENT: NC/AT, EOMI, PERRLA  Neck: supple  Back: no tenderness  Respiratory: crackles at bases  Cardiovascular: S1S2 regular, no murmurs  Abdomen: soft, not tender, not distended, positive BS  Genitourinary: deferred  Rectal: deferred  Musculoskeletal: no muscle tenderness, no joint swelling or tenderness  Extremities: no pedal edema  Neurological: confused, moving all extremities, no focal deficits  Skin: no rashes    Labs:                        12.4   6.8   )-----------( 130      ( 2017 05:43 )             39.7     -    145  |  x   |  x   ----------------------------<  x   x    |  x   |  x     Ca    8.9      2017 05:43         Vancomycin Level, Trough: 18.9 ug/mL ( @ 16:36)      Cultures:                         12.2   9.3   )-----------( 112      ( 2017 06:42 )             39.1     -    150<H>  |  117<H>  |  16  ----------------------------<  224<H>  4.1   |  27  |  0.90    Ca    8.7      2017 05:44  Phos  2.3     -  Mg     2.1                               12.2   9.3   )-----------( 112      ( 2017 06:42 )             39.1     -    157<H>  |  124<H>  |  12  ----------------------------<  96  3.4<L>   |  28  |  0.79    Ca    8.7      2017 06:42  Phos  2.3       Mg     2.1         TPro  6.7  /  Alb  2.7<L>  /  TBili  0.4  /  DBili  x   /  AST  14<L>  /  ALT  11<L>  /  AlkPhos  113             Cultures:                         14.1   11.1  )-----------( 156      ( 2017 17:47 )             45.2         148<H>  |  115<H>  |  16  ----------------------------<  512<HH>  3.2<L>   |  27  |  1.02    Ca    8.5      2017 21:38  Mg     2.2         TPro  6.7  /  Alb  2.7<L>  /  TBili  0.4  /  DBili  x   /  AST  14<L>  /  ALT  11<L>  /  AlkPhos  113       LIVER FUNCTIONS - ( 2017 17:47 )  Alb: 2.7 g/dL / Pro: 6.7 gm/dL / ALK PHOS: 113 U/L / ALT: 11 U/L / AST: 14 U/L / GGT: x           Urinalysis Basic - ( 2017 21:37 )    Color: Yellow / Appearance: very cloudy / S.025 / pH: x  Gluc: x / Ketone: Negative  / Bili: Negative / Urobili: Negative mg/dL   Blood: x / Protein: 30 mg/dL / Nitrite: Negative   Leuk Esterase: Moderate / RBC: 25-50 /HPF / WBC >50   Sq Epi: x / Non Sq Epi: Few / Bacteria: Moderate    Culture - Blood (17 @ 19:26)    Gram Stain:   Growth in anaerobic bottle: Gram Positive Cocci in Clusters    Specimen Source: .Blood None    Culture Results:   Growth in anaerobic bottle: Gram Positive Cocci in Clusters    Culture - Blood (17 @ 19:26)    Gram Stain:   Growth in aerobic bottle: Gram Positive Cocci in Clusters    Specimen Source: .Blood None    Culture Results:   Growth in aerobic bottle: Gram Positive Cocci in Clusters    Culture - Blood (17 @ 19:26)    -  Clindamycin: R 0.5    -  Moxifloxacin(Aerobic): S 2    -  RIF- Rifampin: S <=1    -  Tetra/Doxy: S <=1    -  Vancomycin: S 2    -  Ciprofloxacin: R >2    -  Erythromycin: R >4    -  Gentamicin: S <=1    -  Penicillin: R 8    -  Ampicillin/Sulbactam: R <=8/4    -  Cefazolin: R <=4    -  Levofloxacin: R >4    -  Oxacillin: R >2    -  Trimethoprim/Sulfamethoxazole: S    Gram Stain:   Growth in aerobic bottle: Gram Positive Cocci in Clusters    Specimen Source: .Blood None    Organism: Coag Negative Staphylococcus    Culture Results:   Growth in aerobic bottle: Coag Negative Staphylococcus  ***********Note************  This isolate demonstrates inducible  clindamycin resistance.  Clindamycin may still be effective in some patients.    Organism Identification: Coag Negative Staphylococcus    Method Type: LEWIS    Culture - Blood (17 @ 16:36)    Specimen Source: .Blood None    Culture Results:   No growth to date.        Radiology:    CT chest:  Patchy airspace opacities at the lung bases which may be infectious in  etiology. Follow-up is suggested until resolution.  Indeterminate 1 cm exophytic lesion along the interpolar left kidney.   Ultrasound suggested for further evaluation.  4 mm pulmonary nodule in the right upper lobe.      Advanced directives addressed: full resuscitation
Patient is a 83y old  Female who presents with a chief complaint of sob (2017 01:00)    HPI:  82 y/o Female with h/o DM Type II, PVD s/p Left  BKA, dementia, Afib, Major depression, HTN was admitted on  for lethargy, cough. She is reported with dry, nonproductive cough X 2 weeks. She is alert at baseline but not oriented. Reported with a nonbilious vomiting episode the day prior to admission, for which she received IVF in the facility . She was brought to ED today because family concerned about patient being more lethargic. In ED : given Vanco  1 gm + Cefepime 1gm.     Lying in bed in NAD  Confused  Dose not seem in pain  No fever or chills    MEDICATIONS  (STANDING):  mirtazapine 15milliGRAM(s) Oral at bedtime  simvastatin 10milliGRAM(s) Oral at bedtime  clopidogrel Tablet 75milliGRAM(s) Oral daily  metoprolol 12.5milliGRAM(s) Oral two times a day  memantine 10milliGRAM(s) Oral two times a day  calcium carbonate 1250 mG + Vitamin D (OsCal 500 + D) 1Tablet(s) Oral two times a day  insulin lispro (HumaLOG) corrective regimen sliding scale  SubCutaneous three times a day before meals  dextrose 5%. 1000milliLiter(s) IV Continuous <Continuous>  dextrose 50% Injectable 12.5Gram(s) IV Push once  dextrose 50% Injectable 25Gram(s) IV Push once  dextrose 50% Injectable 25Gram(s) IV Push once  docusate sodium 200milliGRAM(s) Oral daily  cefepime  IVPB 1000milliGRAM(s) IV Intermittent every 12 hours  vancomycin  IVPB  IV Intermittent   vancomycin  IVPB 500milliGRAM(s) IV Intermittent every 12 hours  diltiazem    Tablet 60milliGRAM(s) Oral every 6 hours  desmopressin Injectable 1MICROGram(s) SubCutaneous at bedtime  insulin glargine Injectable (LANTUS) 20Unit(s) SubCutaneous at bedtime  aspirin  chewable 81milliGRAM(s) Oral daily  heparin  Injectable 5000Unit(s) SubCutaneous every 12 hours    MEDICATIONS  (PRN):  dextrose Gel 1Dose(s) Oral once PRN Blood Glucose LESS THAN 70 milliGRAM(s)/deciliter  glucagon  Injectable 1milliGRAM(s) IntraMuscular once PRN Glucose LESS THAN 70 milligrams/deciliter      Vital Signs Last 24 Hrs  T(C): 36.3, Max: 36.7 ( @ 21:44)  T(F): 97.4, Max: 98 ( @ 21:44)  HR: 86 (65 - 86)  BP: 147/78 (117/72 - 151/69)  BP(mean): --  RR: 16 (16 - 18)  SpO2: 99% (94% - 99%)    Physical Exam:        Constitutional: frail looking  HEENT: NC/AT, EOMI, PERRLA  Neck: supple  Back: no tenderness  Respiratory: crackles at bases  Cardiovascular: S1S2 regular, no murmurs  Abdomen: soft, not tender, not distended, positive BS  Genitourinary: deferred  Rectal: deferred  Musculoskeletal: no muscle tenderness, no joint swelling or tenderness  Extremities: no pedal edema  Neurological: confused, moving all extremities, no focal deficits  Skin: no rashes    Labs:                        12.1   6.7   )-----------( 136      ( 2017 05:53 )             37.6     -    140  |  107  |  12  ----------------------------<  112<H>  4.2   |  31  |  0.73    Ca    9.2      2017 05:53  Phos  2.9     -  Mg     2.2     -                          12.4   6.8   )-----------( 130      ( 2017 05:43 )             39.7     -    145  |  x   |  x   ----------------------------<  x   x    |  x   |  x     Ca    8.9      2017 05:43         Vancomycin Level, Trough: 18.9 ug/mL ( @ 16:36)      Cultures:                         12.2   9.3   )-----------( 112      ( 2017 06:42 )             39.1     06-20    150<H>  |  117<H>  |  16  ----------------------------<  224<H>  4.1   |  27  |  0.90    Ca    8.7      2017 05:44  Phos  2.3     06-19  Mg     2.1     06-19                          12.2   9.3   )-----------( 112      ( 2017 06:42 )             39.1     06-19    157<H>  |  124<H>  |  12  ----------------------------<  96  3.4<L>   |  28  |  0.79    Ca    8.7      2017 06:42  Phos  2.3     06-19  Mg     2.1     06-19    TPro  6.7  /  Alb  2.7<L>  /  TBili  0.4  /  DBili  x   /  AST  14<L>  /  ALT  11<L>  /  AlkPhos  113  17           Cultures:                         14.1   11.1  )-----------( 156      ( 2017 17:47 )             45.2     06-17    148<H>  |  115<H>  |  16  ----------------------------<  512<HH>  3.2<L>   |  27  |  1.02    Ca    8.5      2017 21:38  Mg     2.2     -17    TPro  6.7  /  Alb  2.7<L>  /  TBili  0.4  /  DBili  x   /  AST  14<L>  /  ALT  11<L>  /  AlkPhos  113  17     LIVER FUNCTIONS - ( 2017 17:47 )  Alb: 2.7 g/dL / Pro: 6.7 gm/dL / ALK PHOS: 113 U/L / ALT: 11 U/L / AST: 14 U/L / GGT: x           Urinalysis Basic - ( 2017 21:37 )    Color: Yellow / Appearance: very cloudy / S.025 / pH: x  Gluc: x / Ketone: Negative  / Bili: Negative / Urobili: Negative mg/dL   Blood: x / Protein: 30 mg/dL / Nitrite: Negative   Leuk Esterase: Moderate / RBC: 25-50 /HPF / WBC >50   Sq Epi: x / Non Sq Epi: Few / Bacteria: Moderate    Culture - Blood (17 @ 19:26)    Gram Stain:   Growth in anaerobic bottle: Gram Positive Cocci in Clusters    Specimen Source: .Blood None    Culture Results:   Growth in anaerobic bottle: Gram Positive Cocci in Clusters    Culture - Blood (17 @ 19:26)    Gram Stain:   Growth in aerobic bottle: Gram Positive Cocci in Clusters    Specimen Source: .Blood None    Culture Results:   Growth in aerobic bottle: Gram Positive Cocci in Clusters    Culture - Blood (17 @ 19:26)    -  Clindamycin: R 0.5    -  Moxifloxacin(Aerobic): S 2    -  RIF- Rifampin: S <=1    -  Tetra/Doxy: S <=1    -  Vancomycin: S 2    -  Ciprofloxacin: R >2    -  Erythromycin: R >4    -  Gentamicin: S <=1    -  Penicillin: R 8    -  Ampicillin/Sulbactam: R <=8/4    -  Cefazolin: R <=4    -  Levofloxacin: R >4    -  Oxacillin: R >2    -  Trimethoprim/Sulfamethoxazole: S    Gram Stain:   Growth in aerobic bottle: Gram Positive Cocci in Clusters    Specimen Source: .Blood None    Organism: Coag Negative Staphylococcus    Culture Results:   Growth in aerobic bottle: Coag Negative Staphylococcus  ***********Note************  This isolate demonstrates inducible  clindamycin resistance.  Clindamycin may still be effective in some patients.    Organism Identification: Coag Negative Staphylococcus    Method Type: LEWIS    Culture - Blood (17 @ 16:36)    Specimen Source: .Blood None    Culture Results:   No growth to date.        Radiology:    CT chest:  Patchy airspace opacities at the lung bases which may be infectious in  etiology. Follow-up is suggested until resolution.  Indeterminate 1 cm exophytic lesion along the interpolar left kidney.   Ultrasound suggested for further evaluation.  4 mm pulmonary nodule in the right upper lobe.      Advanced directives addressed: full resuscitation
Patient is a 83y old  Female who presents with a chief complaint of sob (2017 01:00)    HPI:  82 y/o Female with h/o DM Type II, PVD s/p Left  BKA, dementia, Afib, Major depression, HTN was admitted on  for lethargy, cough. She is reported with dry, nonproductive cough X 2 weeks. She is alert at baseline but not oriented. Reported with a nonbilious vomiting episode the day prior to admission, for which she received IVF in the facility . She was brought to ED today because family concerned about patient being more lethargic. In ED : given Vanco  1 gm + Cefepime 1gm.     Lying in bed in NAD  Confused  Dose not seem in pain  Weak looking    MEDICATIONS  (STANDING):  aspirin enteric coated 81milliGRAM(s) Oral daily  mirtazapine 15milliGRAM(s) Oral at bedtime  simvastatin 10milliGRAM(s) Oral at bedtime  clopidogrel Tablet 75milliGRAM(s) Oral daily  metoprolol 12.5milliGRAM(s) Oral two times a day  memantine 10milliGRAM(s) Oral two times a day  potassium chloride    Tablet ER 10milliEquivalent(s) Oral daily  calcium carbonate 1250 mG + Vitamin D (OsCal 500 + D) 1Tablet(s) Oral two times a day  heparin  Injectable 5000Unit(s) SubCutaneous every 8 hours  insulin glargine Injectable (LANTUS) 35Unit(s) SubCutaneous at bedtime  insulin lispro (HumaLOG) corrective regimen sliding scale  SubCutaneous three times a day before meals  dextrose 5%. 1000milliLiter(s) IV Continuous <Continuous>  dextrose 50% Injectable 12.5Gram(s) IV Push once  dextrose 50% Injectable 25Gram(s) IV Push once  dextrose 50% Injectable 25Gram(s) IV Push once  docusate sodium 200milliGRAM(s) Oral daily  cefepime  IVPB 1000milliGRAM(s) IV Intermittent every 12 hours  dextrose 5% 1000milliLiter(s) IV Continuous <Continuous>  vancomycin  IVPB  IV Intermittent   vancomycin  IVPB 500milliGRAM(s) IV Intermittent every 12 hours  diltiazem    Tablet 60milliGRAM(s) Oral every 6 hours    MEDICATIONS  (PRN):  dextrose Gel 1Dose(s) Oral once PRN Blood Glucose LESS THAN 70 milliGRAM(s)/deciliter  glucagon  Injectable 1milliGRAM(s) IntraMuscular once PRN Glucose LESS THAN 70 milligrams/deciliter      Vital Signs Last 24 Hrs  T(C): 36.8, Max: 37.2 ( @ 16:50)  T(F): 98.3, Max: 98.9 ( @ 16:50)  HR: 67 (67 - 73)  BP: 146/62 (113/58 - 146/62)  BP(mean): --  RR: 17 (17 - 17)  SpO2: 100% (96% - 100%)    Physical Exam:      Constitutional: frail looking  HEENT: NC/AT, EOMI, PERRLA  Neck: supple  Back: no tenderness  Respiratory: crackles at bases  Cardiovascular: S1S2 regular, no murmurs  Abdomen: soft, not tender, not distended, positive BS  Genitourinary: deferred  Rectal: deferred  Musculoskeletal: no muscle tenderness, no joint swelling or tenderness  Extremities: no pedal edema  Neurological: confused, moving all extremities, no focal deficits  Skin: no rashes    Labs:                        12.2   9.3   )-----------( 112      ( 2017 06:42 )             39.1     06-20    150<H>  |  117<H>  |  16  ----------------------------<  224<H>  4.1   |  27  |  0.90    Ca    8.7      2017 05:44  Phos  2.3     06-19  Mg     2.1     -19                          12.2   9.3   )-----------( 112      ( 2017 06:42 )             39.1     06-19    157<H>  |  124<H>  |  12  ----------------------------<  96  3.4<L>   |  28  |  0.79    Ca    8.7      2017 06:42  Phos  2.3     06-19  Mg     2.1     -19    TPro  6.7  /  Alb  2.7<L>  /  TBili  0.4  /  DBili  x   /  AST  14<L>  /  ALT  11<L>  /  AlkPhos  113             Cultures:                         14.1   11.1  )-----------( 156      ( 2017 17:47 )             45.2         148<H>  |  115<H>  |  16  ----------------------------<  512<HH>  3.2<L>   |  27  |  1.02    Ca    8.5      2017 21:38  Mg     2.2         TPro  6.7  /  Alb  2.7<L>  /  TBili  0.4  /  DBili  x   /  AST  14<L>  /  ALT  11<L>  /  AlkPhos  113       LIVER FUNCTIONS - ( 2017 17:47 )  Alb: 2.7 g/dL / Pro: 6.7 gm/dL / ALK PHOS: 113 U/L / ALT: 11 U/L / AST: 14 U/L / GGT: x           Urinalysis Basic - ( 2017 21:37 )    Color: Yellow / Appearance: very cloudy / S.025 / pH: x  Gluc: x / Ketone: Negative  / Bili: Negative / Urobili: Negative mg/dL   Blood: x / Protein: 30 mg/dL / Nitrite: Negative   Leuk Esterase: Moderate / RBC: 25-50 /HPF / WBC >50   Sq Epi: x / Non Sq Epi: Few / Bacteria: Moderate    Culture - Blood (17 @ 19:26)    Gram Stain:   Growth in anaerobic bottle: Gram Positive Cocci in Clusters    Specimen Source: .Blood None    Culture Results:   Growth in anaerobic bottle: Gram Positive Cocci in Clusters    Culture - Blood (17 @ 19:26)    Gram Stain:   Growth in aerobic bottle: Gram Positive Cocci in Clusters    Specimen Source: .Blood None    Culture Results:   Growth in aerobic bottle: Gram Positive Cocci in Clusters        Radiology:    CT chest:  Patchy airspace opacities at the lung bases which may be infectious in  etiology. Follow-up is suggested until resolution.  Indeterminate 1 cm exophytic lesion along the interpolar left kidney.   Ultrasound suggested for further evaluation.  4 mm pulmonary nodule in the right upper lobe.      Advanced directives addressed: full resuscitation
Patient is a 83y old  Female who presents with a chief complaint of sob (2017 01:00)    HPI:  82 y/o Female with h/o DM Type II, PVD s/p Left  BKA, dementia, Afib, Major depression, HTN was admitted on  for lethargy, cough. She is reported with dry, nonproductive cough X 2 weeks. She is alert at baseline but not oriented. Reported with a nonbilious vomiting episode the day prior to admission, for which she received IVF in the facility . She was brought to ED today because family concerned about patient being more lethargic. In ED : given Vanco  1 gm + Cefepime 1gm.     Lying in bed in NAD  Confused  Dose not seem in pain  Weak looking  Tmax 99.5F    MEDICATIONS  (STANDING):  aspirin enteric coated 81milliGRAM(s) Oral daily  mirtazapine 15milliGRAM(s) Oral at bedtime  simvastatin 10milliGRAM(s) Oral at bedtime  clopidogrel Tablet 75milliGRAM(s) Oral daily  metoprolol 12.5milliGRAM(s) Oral two times a day  memantine 10milliGRAM(s) Oral two times a day  calcium carbonate 1250 mG + Vitamin D (OsCal 500 + D) 1Tablet(s) Oral two times a day  heparin  Injectable 5000Unit(s) SubCutaneous every 8 hours  insulin glargine Injectable (LANTUS) 35Unit(s) SubCutaneous at bedtime  insulin lispro (HumaLOG) corrective regimen sliding scale  SubCutaneous three times a day before meals  dextrose 5%. 1000milliLiter(s) IV Continuous <Continuous>  dextrose 50% Injectable 12.5Gram(s) IV Push once  dextrose 50% Injectable 25Gram(s) IV Push once  dextrose 50% Injectable 25Gram(s) IV Push once  docusate sodium 200milliGRAM(s) Oral daily  cefepime  IVPB 1000milliGRAM(s) IV Intermittent every 12 hours  vancomycin  IVPB  IV Intermittent   vancomycin  IVPB 500milliGRAM(s) IV Intermittent every 12 hours  diltiazem    Tablet 60milliGRAM(s) Oral every 6 hours  dextrose 5% 1000milliLiter(s) IV Continuous <Continuous>  potassium chloride   Powder 10milliEquivalent(s) Oral daily    MEDICATIONS  (PRN):  dextrose Gel 1Dose(s) Oral once PRN Blood Glucose LESS THAN 70 milliGRAM(s)/deciliter  glucagon  Injectable 1milliGRAM(s) IntraMuscular once PRN Glucose LESS THAN 70 milligrams/deciliter      Vital Signs Last 24 Hrs  T(C): 36.8, Max: 37.6 ( @ 22:01)  T(F): 98.2, Max: 99.6 ( @ 22:01)  HR: 64 (64 - 68)  BP: 131/70 (117/46 - 142/49)  BP(mean): --  RR: 17 (16 - 18)  SpO2: 99% (98% - 99%)    Physical Exam:        Constitutional: frail looking  HEENT: NC/AT, EOMI, PERRLA  Neck: supple  Back: no tenderness  Respiratory: crackles at bases  Cardiovascular: S1S2 regular, no murmurs  Abdomen: soft, not tender, not distended, positive BS  Genitourinary: deferred  Rectal: deferred  Musculoskeletal: no muscle tenderness, no joint swelling or tenderness  Extremities: no pedal edema  Neurological: confused, moving all extremities, no focal deficits  Skin: no rashes    Labs:                        12.2   9.3   )-----------( 112      ( 2017 06:42 )             39.1     06-20    150<H>  |  117<H>  |  16  ----------------------------<  224<H>  4.1   |  27  |  0.90    Ca    8.7      2017 05:44  Phos  2.3     06-19  Mg     2.1     06-19                          12.2   9.3   )-----------( 112      ( 2017 06:42 )             39.1     06-19    157<H>  |  124<H>  |  12  ----------------------------<  96  3.4<L>   |  28  |  0.79    Ca    8.7      2017 06:42  Phos  2.3     06-19  Mg     2.1     06-19    TPro  6.7  /  Alb  2.7<L>  /  TBili  0.4  /  DBili  x   /  AST  14<L>  /  ALT  11<L>  /  AlkPhos  113  06-17           Cultures:                         14.1   11.1  )-----------( 156      ( 2017 17:47 )             45.2         148<H>  |  115<H>  |  16  ----------------------------<  512<HH>  3.2<L>   |  27  |  1.02    Ca    8.5      2017 21:38  Mg     2.2         TPro  6.7  /  Alb  2.7<L>  /  TBili  0.4  /  DBili  x   /  AST  14<L>  /  ALT  11<L>  /  AlkPhos  113       LIVER FUNCTIONS - ( 2017 17:47 )  Alb: 2.7 g/dL / Pro: 6.7 gm/dL / ALK PHOS: 113 U/L / ALT: 11 U/L / AST: 14 U/L / GGT: x           Urinalysis Basic - ( 2017 21:37 )    Color: Yellow / Appearance: very cloudy / S.025 / pH: x  Gluc: x / Ketone: Negative  / Bili: Negative / Urobili: Negative mg/dL   Blood: x / Protein: 30 mg/dL / Nitrite: Negative   Leuk Esterase: Moderate / RBC: 25-50 /HPF / WBC >50   Sq Epi: x / Non Sq Epi: Few / Bacteria: Moderate    Culture - Blood (17 @ 19:26)    Gram Stain:   Growth in anaerobic bottle: Gram Positive Cocci in Clusters    Specimen Source: .Blood None    Culture Results:   Growth in anaerobic bottle: Gram Positive Cocci in Clusters    Culture - Blood (17 @ 19:26)    Gram Stain:   Growth in aerobic bottle: Gram Positive Cocci in Clusters    Specimen Source: .Blood None    Culture Results:   Growth in aerobic bottle: Gram Positive Cocci in Clusters    Culture - Blood (17 @ 19:26)    -  Clindamycin: R 0.5    -  Moxifloxacin(Aerobic): S 2    -  RIF- Rifampin: S <=1    -  Tetra/Doxy: S <=1    -  Vancomycin: S 2    -  Ciprofloxacin: R >2    -  Erythromycin: R >4    -  Gentamicin: S <=1    -  Penicillin: R 8    -  Ampicillin/Sulbactam: R <=8/4    -  Cefazolin: R <=4    -  Levofloxacin: R >4    -  Oxacillin: R >2    -  Trimethoprim/Sulfamethoxazole: S    Gram Stain:   Growth in aerobic bottle: Gram Positive Cocci in Clusters    Specimen Source: .Blood None    Organism: Coag Negative Staphylococcus    Culture Results:   Growth in aerobic bottle: Coag Negative Staphylococcus  ***********Note************  This isolate demonstrates inducible  clindamycin resistance.  Clindamycin may still be effective in some patients.    Organism Identification: Coag Negative Staphylococcus    Method Type: LEWIS        Radiology:    CT chest:  Patchy airspace opacities at the lung bases which may be infectious in  etiology. Follow-up is suggested until resolution.  Indeterminate 1 cm exophytic lesion along the interpolar left kidney.   Ultrasound suggested for further evaluation.  4 mm pulmonary nodule in the right upper lobe.      Advanced directives addressed: full resuscitation
Patient lethargic, will hold regular dose of lantus (35 units) and administer 18 units due to likely decreased PO intake.
c/c: sob    HPI: . Hx obtained from Chart/available records as pt with dementia and is unable to provide history.     82 y/o F with Pmhx of DM Type II on Insulin (LAst A1c 10.6), PVD s/p Left  BKA, dementia, Afib, Major depression, HTN who presented to the ED with C/o lethargy, cough.   Cough X 2 weeks, dry, nonproductive. She is alert at baseline but not oriented and has underlying dementia. Non verbal and requires help with feeding. Incontinent with bladder and bowel. There are also C/o nonbilious vomiting episode, for which she received IVF in the facility .She Had labs and CXR a week pta, which showed Na as 148 and Cr as 0.7 GFR was >60.  Was brought to ED because family concerned about patient being more lethargic. CT scan of chest showed possible b/l PNA.  Sodium was elevated at 165 (Corrected sodium was 169) which rapidly corrected to 155 (corrected ) after 300 ml of D5 given over 90 min in conjunction with Abx.  On telemetry she had multiple beat run of SVT. She was admitted to tele with Pneumonia/hypernatremia/dehydration and UTI. Hospital course notable for staph bacteremia. ID consulted and patient is currently on abx. Repeat blood cx have been negative. Was also seen by nephrology for hypernatremia. Felt to have some degree of DI. Started on ddavp. Sodium now stable. Mental status appears to be near baseline.     6/24: pt seen and examined earlier today. Chart/labs reviewed. Minimally verbal. Comfortable.       Review of system-unable to obtain due to dementia.    Vital Signs Last 24 Hrs  T(C): 36.9, Max: 36.9 (06-24 @ 11:51)  T(F): 98.4, Max: 98.4 (06-24 @ 11:51)  HR: 73 (68 - 83)  BP: 145/59 (102/84 - 145/59)  RR: 18 (15 - 18)  SpO2: 100% (97% - 100%)  PHYSICAL EXAM:    GENERAL: sleeping but arousable. minimally verbal.   HEAD:  Atraumatic, Normocephalic  EYES: EOMI, PERRLA  HEENT:dry mucous membranes  NECK: Supple, No JVD  NERVOUS SYSTEM: arousable. does not follow commands.   CHEST/LUNG: decreased bs at bases.  HEART: Regular rate and rhythm  ABDOMEN: Soft, Nontender, Nondistended; Bowel sounds present  GENITOURINARY- Voiding, no palpable bladder  EXTREMITIES:  No clubbing, cyanosis, or edema, L bka  MUSCULOSKELTAL- No muscle tenderness, No joint tenderness  SKIN-no rash    MEDICATIONS  (STANDING):  mirtazapine 15milliGRAM(s) Oral at bedtime  simvastatin 10milliGRAM(s) Oral at bedtime  clopidogrel Tablet 75milliGRAM(s) Oral daily  metoprolol 12.5milliGRAM(s) Oral two times a day  memantine 10milliGRAM(s) Oral two times a day  calcium carbonate 1250 mG + Vitamin D (OsCal 500 + D) 1Tablet(s) Oral two times a day  insulin lispro (HumaLOG) corrective regimen sliding scale  SubCutaneous three times a day before meals  dextrose 5%. 1000milliLiter(s) IV Continuous <Continuous>  dextrose 50% Injectable 12.5Gram(s) IV Push once  dextrose 50% Injectable 25Gram(s) IV Push once  dextrose 50% Injectable 25Gram(s) IV Push once  docusate sodium 200milliGRAM(s) Oral daily  cefepime  IVPB 1000milliGRAM(s) IV Intermittent every 12 hours  vancomycin  IVPB  IV Intermittent   vancomycin  IVPB 500milliGRAM(s) IV Intermittent every 12 hours  diltiazem    Tablet 60milliGRAM(s) Oral every 6 hours  desmopressin Injectable 1MICROGram(s) SubCutaneous at bedtime  insulin glargine Injectable (LANTUS) 20Unit(s) SubCutaneous at bedtime  aspirin  chewable 81milliGRAM(s) Oral daily  heparin  Injectable 5000Unit(s) SubCutaneous every 12 hours    MEDICATIONS  (PRN):  dextrose Gel 1Dose(s) Oral once PRN Blood Glucose LESS THAN 70 milliGRAM(s)/deciliter  glucagon  Injectable 1milliGRAM(s) IntraMuscular once PRN Glucose LESS THAN 70 milligrams/deciliter      LABS:    06-24    145  |  109<H>  |  14  ----------------------------<  167<H>  4.1   |  31  |  0.70    Ca    9.0      24 Jun 2017 05:33        CAPILLARY BLOOD GLUCOSE  177 (24 Jun 2017 11:52)  158 (24 Jun 2017 08:18)  287 (23 Jun 2017 21:51)  142 (23 Jun 2017 16:58)                  Assessment and Plan:   		  83F, pmh as above a/w:    Multifocal pneumonia presumed aspiration, with bacteremia and UTI:   -ID eval appreciated -continue cefepime and vanco to cover for gram negative and positive bacteria.  -positive BCx for staph, abx per ID  -repeat blood cultures negative  -repeat xray 6/22 --> Bibasilar atelectasis with medial right lower lobe airspace disease, question pneumonia.  -leukocytosis resolved.    Left upper extremity edema: Dependent - multifactorial, immobility hypoalbuminemia/poor nutrition  -doppler negative for DVT  -echo pending.    Hypernatremia secondary to dehydration and some degree chronic DI: RESOLVED.  -off IVFs  -per nephro --> c/w DDAVP.  Will need intranasal on discharge.  -CT head negative for acute pathology.    Hypokalemia: RESOLVED.  -supplemental.    Metabolic encephalopathy in setting of advanced dementia: RESOLVED, at baseline.  -continue to treat underlying cause  -continue namenda.  -supportive care.     h/o Atrial fibrillation with svt on tele while in ED  -in sinus here  -continue ccb/bb  -not on a/c    PVD s/p left bka:  -continue plavix    HTN:  -continue ccb/bb    Uncontrolled DM: Pt eating less and needs frequent insulin adjustment.  -hgba1c >10 on admission.  -adjust insulin for goal FS < 200     Dyslipidemia:  -continue statin     DVT px
c/c: sob    HPI: . Hx obtained from Chart/available records as pt with dementia and is unable to provide history.     82 y/o F with Pmhx of DM Type II on Insulin (LAst A1c 10.6), PVD s/p Left  BKA, dementia, Afib, Major depression, HTN who presented to the ED with C/o lethargy, cough.   Cough X 2 weeks, dry, nonproductive. She is alert at baseline but not oriented and has underlying dementia. Non verbal and requires help with feeding. Incontinent with bladder and bowel. There are also C/o nonbilious vomiting episode, for which she received IVF in the facility .She Had labs and CXR a week pta, which showed Na as 148 and Cr as 0.7 GFR was >60.  Was brought to ED because family concerned about patient being more lethargic. CT scan of chest showed possible b/l PNA.  Sodium was elevated at 165 (Corrected sodium was 169) which rapidly corrected to 155 (corrected ) after 300 ml of D5 given over 90 min in conjunction with Abx.  On telemetry she had multiple beat run of SVT. She was admitted to tele with Pneumonia/hypernatremia/dehydration and UTI. Hospital course notable for staph bacteremia. ID consulted and patient is currently on abx. Repeat blood cx have been negative. Was also seen by nephrology for hypernatremia. Felt to have some degree of DI. Started on ddavp. Sodium now stable. Mental status appears to be near baseline.     6/25: pt seen and examined earlier today. Comfortable.       Review of system-unable to obtain due to dementia.    Vital Signs Last 24 Hrs  T(C): 36.3, Max: 36.9 (06-25 @ 05:25)  T(F): 97.4, Max: 98.4 (06-25 @ 05:25)  HR: 70 (64 - 80)  BP: 151/61 (126/42 - 151/61)  BP(mean): --  RR: 17 (17 - 18)  SpO2: 99% (98% - 100%)  PHYSICAL EXAM:    GENERAL: sleeping but arousable. minimally verbal.   HEAD:  Atraumatic, Normocephalic  EYES: EOMI, PERRLA  HEENT:dry mucous membranes  NECK: Supple, No JVD  NERVOUS SYSTEM: arousable. does not follow commands.   CHEST/LUNG: decreased bs at bases.  HEART: Regular rate and rhythm  ABDOMEN: Soft, Nontender, Nondistended; Bowel sounds present  GENITOURINARY- Voiding, no palpable bladder  EXTREMITIES:  No clubbing, cyanosis, or edema, L bka  MUSCULOSKELTAL- No muscle tenderness, No joint tenderness  SKIN-no rash    MEDICATIONS  (STANDING):  mirtazapine 15milliGRAM(s) Oral at bedtime  simvastatin 10milliGRAM(s) Oral at bedtime  clopidogrel Tablet 75milliGRAM(s) Oral daily  metoprolol 12.5milliGRAM(s) Oral two times a day  memantine 10milliGRAM(s) Oral two times a day  calcium carbonate 1250 mG + Vitamin D (OsCal 500 + D) 1Tablet(s) Oral two times a day  insulin lispro (HumaLOG) corrective regimen sliding scale  SubCutaneous three times a day before meals  dextrose 5%. 1000milliLiter(s) IV Continuous <Continuous>  dextrose 50% Injectable 12.5Gram(s) IV Push once  dextrose 50% Injectable 25Gram(s) IV Push once  dextrose 50% Injectable 25Gram(s) IV Push once  docusate sodium 200milliGRAM(s) Oral daily  cefepime  IVPB 1000milliGRAM(s) IV Intermittent every 12 hours  vancomycin  IVPB  IV Intermittent   vancomycin  IVPB 500milliGRAM(s) IV Intermittent every 12 hours  diltiazem    Tablet 60milliGRAM(s) Oral every 6 hours  desmopressin Injectable 1MICROGram(s) SubCutaneous at bedtime  insulin glargine Injectable (LANTUS) 20Unit(s) SubCutaneous at bedtime  aspirin  chewable 81milliGRAM(s) Oral daily  heparin  Injectable 5000Unit(s) SubCutaneous every 12 hours    MEDICATIONS  (PRN):  dextrose Gel 1Dose(s) Oral once PRN Blood Glucose LESS THAN 70 milliGRAM(s)/deciliter  glucagon  Injectable 1milliGRAM(s) IntraMuscular once PRN Glucose LESS THAN 70 milligrams/deciliter      LABS:  LABS:    06-25    140  |  107  |  13  ----------------------------<  101<H>  4.0   |  28  |  0.63    Ca    8.9      25 Jun 2017 07:08        Assessment and Plan:   		  83F, pmh as above a/w:    Multifocal pneumonia presumed aspiration, with bacteremia and UTI:   -positive BCx for staph, abx per ID  -repeat blood cultures negative  -continue iv cefepime and vancomycin as per ID.   -f/u with  re: duration of abx.   -repeat xray 6/22 --> Bibasilar atelectasis with medial right lower lobe airspace disease, question pneumonia.  -leukocytosis resolved.    Left upper extremity edema: Dependent - multifactorial, immobility hypoalbuminemia/poor nutrition  -doppler negative for DVT  -echo results still pending.    Hypernatremia secondary to dehydration and some degree chronic DI: RESOLVED.  -off IVFs  -per nephro --> c/w DDAVP.  Will need intranasal on discharge.  -CT head negative for acute pathology.    Hypokalemia: RESOLVED.  -supplemental.    Metabolic encephalopathy in setting of advanced dementia: RESOLVED, at baseline.  -continue to treat underlying cause  -continue namenda.  -supportive care.     h/o Atrial fibrillation with svt on tele while in ED  -in sinus here  -continue ccb/bb  -not on a/c    PVD s/p left bka:  -continue plavix    HTN:  -continue ccb/bb    Uncontrolled DM: Pt eating less and needs frequent insulin adjustment.  -hgba1c >10 on admission.  -adjust insulin for goal FS < 200     Dyslipidemia:  -continue statin     DVT px    Dispo:   f/u 2Decho  f/u with ID re: abx regimen and duration upon dc, then dc planning.
c/c: sob    HPI: . Hx obtained from Chart/available records as pt with dementia and unable to provide history.     82 y/o F with Pmhx of DM Type II on Insulin (LAst A1c 10.6), PVD s/p Left  BKA, dementia, Afib, Major depression, HTN who presented to the ED with C/o lethargy, cough.   Cough X 2 weeks, dry, nonproductive. She is alert at baseline but not oriented and has underlying dementia. Non verbal and requires help with feeding. Incontinent with bladder and bowel. There are also C/o nonbilious vomiting episode yesterday, for which she received IVF in the facility .She Had labs and CXR a week ago, which shows Na as 148 and Cr as 0.7 GFR was >60.  Was brought to ED because family concerned about patient being more lethargic. CT scan of chest showed possible b/l PNA.  Sodium was elevated at 165 (Corrected sodium was 169) which rapidly corrected to 155 (corrected ) after 300 ml of D5 given over 90 min in conjunction with Abx.  On telemetry she had multiple beat run of SVT. She was admitted to tele with Pneumonia/hypernatremia/dehydration and UTI.     : pt seen and examined. chart/labs reviewed. Lethargic but arousable. Denies pain/sob.   : Alert, comfortable. Unable to elicit any response.       Review of system-unable to obtain due to dementia.    Vital Signs Last 24 Hrs  T(C): 36.2, Max: 37.1 (-18 @ 16:00)  T(F): 97.2, Max: 98.8 (18 @ 16:00)  HR: 70 (61 - 70)  BP: 120/89 (111/44 - 150/57)  BP(mean): --  RR: --  SpO2: 100% (92% - 100%)    PHYSICAL EXAM:    GENERAL: alert, NAD, a+o x 0  HEAD:  Atraumatic, Normocephalic  EYES: EOMI, PERRLA  HEENT:dry mucous membranes  NECK: Supple, No JVD  NERVOUS SYSTEM:  lethargic but arousable. does not follow commands.   CHEST/LUNG: decreased bs at bases.  HEART: Regular rate and rhythm  ABDOMEN: Soft, Nontender, Nondistended; Bowel sounds present  GENITOURINARY- Voiding, no palpable bladder  EXTREMITIES:  No clubbing, cyanosis, or edema, L bka  MUSCULOSKELTAL- No muscle tenderness, No joint tenderness  SKIN-no rash    CARDIAC MARKERS ( 2017 17:47 )  <0.015 ng/mL / x     / x     / x     / x                                12.2   9.3   )-----------( 112      ( 2017 06:42 )             39.1     06-19    157<H>  |  124<H>  |  12  ----------------------------<  96  3.4<L>   |  28  |  0.79    Ca    8.7      2017 06:42  Phos  2.3     -  Mg     2.1     -    TPro  6.7  /  Alb  2.7<L>  /  TBili  0.4  /  DBili  x   /  AST  14<L>  /  ALT  11<L>  /  AlkPhos  113  -17    CAPILLARY BLOOD GLUCOSE  170 (2017 12:13)  107 (2017 09:53)  70 (2017 09:15)  61 (2017 08:49)  69 (2017 08:48)  211 (2017 22:32)  107 (2017 17:32)    LIVER FUNCTIONS - ( 2017 17:47 )  Alb: 2.7 g/dL / Pro: 6.7 gm/dL / ALK PHOS: 113 U/L / ALT: 11 U/L / AST: 14 U/L / GGT: x             Urinalysis Basic - ( 2017 21:37 )    Color: Yellow / Appearance: very cloudy / S.025 / pH: x  Gluc: x / Ketone: Negative  / Bili: Negative / Urobili: Negative mg/dL   Blood: x / Protein: 30 mg/dL / Nitrite: Negative   Leuk Esterase: Moderate / RBC: 25-50 /HPF / WBC >50   Sq Epi: x / Non Sq Epi: Few / Bacteria: Moderate      ABG - ( 2017 23:48 )  pH: 7.42  /  pCO2: 44    /  pO2: 93    / HCO3: 28    / Base Excess: 3     /  SaO2: 97          MEDICATIONS  (STANDING):  aspirin enteric coated 81milliGRAM(s) Oral daily  diltiazem   CD 300milliGRAM(s) Oral daily  mirtazapine 15milliGRAM(s) Oral at bedtime  simvastatin 10milliGRAM(s) Oral at bedtime  clopidogrel Tablet 75milliGRAM(s) Oral daily  metoprolol 12.5milliGRAM(s) Oral two times a day  memantine 10milliGRAM(s) Oral two times a day  potassium chloride    Tablet ER 10milliEquivalent(s) Oral daily  calcium carbonate 1250 mG + Vitamin D (OsCal 500 + D) 1Tablet(s) Oral two times a day  heparin  Injectable 5000Unit(s) SubCutaneous every 8 hours  insulin glargine Injectable (LANTUS) 35Unit(s) SubCutaneous at bedtime  insulin lispro (HumaLOG) corrective regimen sliding scale  SubCutaneous three times a day before meals  dextrose 5%. 1000milliLiter(s) IV Continuous <Continuous>  dextrose 50% Injectable 12.5Gram(s) IV Push once  dextrose 50% Injectable 25Gram(s) IV Push once  dextrose 50% Injectable 25Gram(s) IV Push once  docusate sodium 200milliGRAM(s) Oral daily  cefepime  IVPB 1000milliGRAM(s) IV Intermittent every 12 hours  dextrose 5% 1000milliLiter(s) IV Continuous <Continuous>  vancomycin  IVPB  IV Intermittent   potassium chloride    Tablet ER 20milliEquivalent(s) Oral once    MEDICATIONS  (PRN):  dextrose Gel 1Dose(s) Oral once PRN Blood Glucose LESS THAN 70 milliGRAM(s)/deciliter  glucagon  Injectable 1milliGRAM(s) IntraMuscular once PRN Glucose LESS THAN 70 milligrams/deciliter          Assessment and Plan:   · Assessment		  83F, pmh as above a/w:    1. Multifocal pneumonia presumed aspiration, with bacteremia and UTI:   -ID eval appreciated -continue cefepime and vanco to cover for gram negative and positive bacteria.  -positive BCx, follow up sensitivities.  -leukocytosis resolved.  -o2 prn    2. Hypernatremia secondary to dehydration: IMPROVING.  -IVFs per nephro    Hypokalemia:  -nephro to add kcl to fluid   -supplemental.    3. Metabolic encephalopathy in setting of dementia: RESOLVED.  -continue to treat underlying cause  -supportive care.    4. h/o Atrial fibrillation with svt on tele while in ED  -in sinus here  -continue ccb/bb  -not on a/c    5. PVD s/p left bka:  -continue plavix    6. HTN:  -continue ccb/bb    7. Uncontrolled DM:  -hgba1c >10  -continue lantus/sliding scale  -adjust based on sugars    8. Dyslipidemia:  -continue statin    9. Advanced Dementia:  -continue namenda.   -supportive care.    10. DVT px    Attending Statement:  40 minutes spent on total encounter; more than 50% of the visit was spent counseling and/or coordinating care by the attending physician.
c/c: sob    HPI: . Hx obtained from Chart/available records as pt with dementia and unable to provide history.     84 y/o F with Pmhx of DM Type II on Insulin (LAst A1c 10.6), PVD s/p Left  BKA, dementia, Afib, Major depression, HTN who presented to the ED with C/o lethargy, cough.         Cough X 2 weeks, dry, nonproductive. She is alert at baseline but not oriented and has underlying dementia. Non verbal and requires help with feeding. Incontinent with bladder and bowel. There are also C/o nonbilious vomiting episode yesterday, for which she received IVF in the facility .She Had labs and CXR a week ago, which shows Na as 148 and Cr as 0.7 GFR was >60.  Was brought to ED because family concerned about patient being more lethargic.   CT scan of chest showed possible b/l PNA.  Sodium was elevated at 165 (Corrected sodium was 169) which rapidly corrected to 155 (corrected ) after 300 ml of D5 given over 90 min in conjunction with Abx.  On telemetry she had multiple beat run of SVT. She was admitted to tele with Pneumonia/hypernatremia/dehydration and UTI.     : pt seen and examined. chart/labs reviewed. Lethargic but arousable. Denies pain/sob.       Review of system-unable to obtain due to lethargy/dementia.          T(C): 36.3, Max: 37.9 (-17 @ 16:50)  HR: 63 (63 - 92)  BP: 145/50 (114/59 - 147/70)  RR: 16 (16 - 20)  SpO2: 99% (95% - 100%)    PHYSICAL EXAM:    GENERAL: lethargic but arousable, no distress  HEAD:  Atraumatic, Normocephalic  EYES: EOMI, PERRLA  HEENT:dry mucous membranes  NECK: Supple, No JVD  NERVOUS SYSTEM:  lethargic but arousable. does not follow commands.   CHEST/LUNG: decreased bs at bases.  HEART: Regular rate and rhythm  ABDOMEN: Soft, Nontender, Nondistended; Bowel sounds present  GENITOURINARY- Voiding, no palpable bladder  EXTREMITIES:  No clubbing, cyanosis, or edema, L bka  MUSCULOSKELTAL- No muscle tenderness, No joint tenderness  SKIN-no rash        LABS:                        13.3   12.0  )-----------( 134      ( 2017 06:12 )             42.3     -18    162<HH>  |  127<H>  |  16  ----------------------------<  148<H>  3.6   |  32<H>  |  0.81    Ca    8.8      2017 06:12  Mg     2.2         TPro  6.7  /  Alb  2.7<L>  /  TBili  0.4  /  DBili  x   /  AST  14<L>  /  ALT  11<L>  /  AlkPhos  113        Urinalysis Basic - ( 2017 21:37 )    Color: Yellow / Appearance: very cloudy / S.025 / pH: x  Gluc: x / Ketone: Negative  / Bili: Negative / Urobili: Negative mg/dL   Blood: x / Protein: 30 mg/dL / Nitrite: Negative   Leuk Esterase: Moderate / RBC: 25-50 /HPF / WBC >50   Sq Epi: x / Non Sq Epi: Few / Bacteria: Moderate        CAPILLARY BLOOD GLUCOSE  90 (2017 11:31)  85 (2017 08:17)  228 (2017 00:04)  201 (2017 15:59)      ABG - ( 2017 23:48 )  pH: 7.42  /  pCO2: 44    /  pO2: 93    / HCO3: 28    / Base Excess: 3     /  SaO2: 97                    MEDS  aspirin enteric coated 81milliGRAM(s) Oral daily  diltiazem   CD 300milliGRAM(s) Oral daily  mirtazapine 15milliGRAM(s) Oral at bedtime  simvastatin 10milliGRAM(s) Oral at bedtime  clopidogrel Tablet 75milliGRAM(s) Oral daily  metoprolol 12.5milliGRAM(s) Oral two times a day  memantine 10milliGRAM(s) Oral two times a day  potassium chloride    Tablet ER 10milliEquivalent(s) Oral daily  calcium carbonate 1250 mG + Vitamin D (OsCal 500 + D) 1Tablet(s) Oral two times a day  heparin  Injectable 5000Unit(s) SubCutaneous every 8 hours  insulin glargine Injectable (LANTUS) 35Unit(s) SubCutaneous at bedtime  insulin lispro (HumaLOG) corrective regimen sliding scale  SubCutaneous three times a day before meals  dextrose 5%. 1000milliLiter(s) IV Continuous <Continuous>  dextrose Gel 1Dose(s) Oral once PRN  dextrose 50% Injectable 12.5Gram(s) IV Push once  dextrose 50% Injectable 25Gram(s) IV Push once  dextrose 50% Injectable 25Gram(s) IV Push once  glucagon  Injectable 1milliGRAM(s) IntraMuscular once PRN  docusate sodium 200milliGRAM(s) Oral daily  cefepime  IVPB 1000milliGRAM(s) IV Intermittent every 12 hours  dextrose 5% + sodium chloride 0.45%. 1000milliLiter(s) IV Continuous <Continuous>
c/c: sob    HPI: . Hx obtained from Chart/available records as pt with dementia and unable to provide history.     84 y/o F with Pmhx of DM Type II on Insulin (LAst A1c 10.6), PVD s/p Left  BKA, dementia, Afib, Major depression, HTN who presented to the ED with C/o lethargy, cough.   Cough X 2 weeks, dry, nonproductive. She is alert at baseline but not oriented and has underlying dementia. Non verbal and requires help with feeding. Incontinent with bladder and bowel. There are also C/o nonbilious vomiting episode yesterday, for which she received IVF in the facility .She Had labs and CXR a week ago, which shows Na as 148 and Cr as 0.7 GFR was >60.  Was brought to ED because family concerned about patient being more lethargic. CT scan of chest showed possible b/l PNA.  Sodium was elevated at 165 (Corrected sodium was 169) which rapidly corrected to 155 (corrected ) after 300 ml of D5 given over 90 min in conjunction with Abx.  On telemetry she had multiple beat run of SVT. She was admitted to tele with Pneumonia/hypernatremia/dehydration and UTI.     6/18: pt seen and examined. chart/labs reviewed. Lethargic but arousable. Denies pain/sob.   6/19: Alert, comfortable. Unable to elicit any response.   6/20: More alert today, responding to basic questions. Otherwise pleasantly confused. Denies fever, chills, N, V, abd pain, CP, SOB.      Review of system-unable to obtain due to dementia.    Vital Signs Last 24 Hrs  T(C): 36.7, Max: 37.2 (06-19 @ 16:50)  T(F): 98, Max: 98.9 (06-19 @ 16:50)  HR: 68 (67 - 73)  BP: 142/49 (113/58 - 148/64)  BP(mean): --  RR: 18 (17 - 18)  SpO2: 99% (99% - 100%)    PHYSICAL EXAM:    GENERAL: alert, NAD, a+o x 0  HEAD:  Atraumatic, Normocephalic  EYES: EOMI, PERRLA  HEENT:dry mucous membranes  NECK: Supple, No JVD  NERVOUS SYSTEM:  lethargic but arousable. does not follow commands.   CHEST/LUNG: decreased bs at bases.  HEART: Regular rate and rhythm  ABDOMEN: Soft, Nontender, Nondistended; Bowel sounds present  GENITOURINARY- Voiding, no palpable bladder  EXTREMITIES:  No clubbing, cyanosis, or edema, L bka  MUSCULOSKELTAL- No muscle tenderness, No joint tenderness  SKIN-no rash    MEDICATIONS  (STANDING):  aspirin enteric coated 81milliGRAM(s) Oral daily  mirtazapine 15milliGRAM(s) Oral at bedtime  simvastatin 10milliGRAM(s) Oral at bedtime  clopidogrel Tablet 75milliGRAM(s) Oral daily  metoprolol 12.5milliGRAM(s) Oral two times a day  memantine 10milliGRAM(s) Oral two times a day  potassium chloride    Tablet ER 10milliEquivalent(s) Oral daily  calcium carbonate 1250 mG + Vitamin D (OsCal 500 + D) 1Tablet(s) Oral two times a day  heparin  Injectable 5000Unit(s) SubCutaneous every 8 hours  insulin glargine Injectable (LANTUS) 35Unit(s) SubCutaneous at bedtime  insulin lispro (HumaLOG) corrective regimen sliding scale  SubCutaneous three times a day before meals  dextrose 5%. 1000milliLiter(s) IV Continuous <Continuous>  dextrose 50% Injectable 12.5Gram(s) IV Push once  dextrose 50% Injectable 25Gram(s) IV Push once  dextrose 50% Injectable 25Gram(s) IV Push once  docusate sodium 200milliGRAM(s) Oral daily  cefepime  IVPB 1000milliGRAM(s) IV Intermittent every 12 hours  vancomycin  IVPB  IV Intermittent   vancomycin  IVPB 500milliGRAM(s) IV Intermittent every 12 hours  diltiazem    Tablet 60milliGRAM(s) Oral every 6 hours  dextrose 5% 1000milliLiter(s) IV Continuous <Continuous>    MEDICATIONS  (PRN):  dextrose Gel 1Dose(s) Oral once PRN Blood Glucose LESS THAN 70 milliGRAM(s)/deciliter  glucagon  Injectable 1milliGRAM(s) IntraMuscular once PRN Glucose LESS THAN 70 milligrams/deciliter                              12.2   9.3   )-----------( 112      ( 19 Jun 2017 06:42 )             39.1     06-20    150<H>  |  117<H>  |  16  ----------------------------<  224<H>  4.1   |  27  |  0.90    Ca    8.7      20 Jun 2017 05:44  Phos  2.3     06-19  Mg     2.1     06-19      CAPILLARY BLOOD GLUCOSE  133 (20 Jun 2017 12:31)  179 (20 Jun 2017 08:47)  184 (19 Jun 2017 17:28)                      Assessment and Plan:   · Assessment		  83F, pmh as above a/w:    1. Multifocal pneumonia presumed aspiration, with bacteremia and UTI:   -ID eval appreciated -continue cefepime and vanco to cover for gram negative and positive bacteria.  -positive BCx, follow up sensitivities. Possible contamination.  -leukocytosis resolved.  -o2 prn    2. Hypernatremia secondary to dehydration: IMPROVING.  -IVFs per nephro    Hypokalemia: RESOLVED.  -supplemental.    3. Metabolic encephalopathy in setting of dementia: RESOLVED.  -continue to treat underlying cause  -supportive care.    4. h/o Atrial fibrillation with svt on tele while in ED  -in sinus here  -continue ccb/bb  -not on a/c    5. PVD s/p left bka:  -continue plavix    6. HTN:  -continue ccb/bb    7. Uncontrolled DM:  -hgba1c >10  -adjust insulin for goal FS < 200    8. Dyslipidemia:  -continue statin    9. Advanced Dementia:  -continue namenda.   -supportive care.    10. DVT px    Attending Statement:  40 minutes spent on total encounter; more than 50% of the visit was spent counseling and/or coordinating care by the attending physician.
c/c: sob    HPI: . Hx obtained from Chart/available records as pt with dementia and unable to provide history.     84 y/o F with Pmhx of DM Type II on Insulin (LAst A1c 10.6), PVD s/p Left  BKA, dementia, Afib, Major depression, HTN who presented to the ED with C/o lethargy, cough.   Cough X 2 weeks, dry, nonproductive. She is alert at baseline but not oriented and has underlying dementia. Non verbal and requires help with feeding. Incontinent with bladder and bowel. There are also C/o nonbilious vomiting episode yesterday, for which she received IVF in the facility .She Had labs and CXR a week ago, which shows Na as 148 and Cr as 0.7 GFR was >60.  Was brought to ED because family concerned about patient being more lethargic. CT scan of chest showed possible b/l PNA.  Sodium was elevated at 165 (Corrected sodium was 169) which rapidly corrected to 155 (corrected ) after 300 ml of D5 given over 90 min in conjunction with Abx.  On telemetry she had multiple beat run of SVT. She was admitted to tele with Pneumonia/hypernatremia/dehydration and UTI.     6/18: pt seen and examined. chart/labs reviewed. Lethargic but arousable. Denies pain/sob.   6/19: Alert, comfortable. Unable to elicit any response.   6/20: More alert today, responding to basic questions. Otherwise pleasantly confused. Denies fever, chills, N, V, abd pain, CP, SOB.  6/21: Status quo. Not very responsive. Has low grade fevers. Otherwise comfortable.      Review of system-unable to obtain due to dementia.    Vital Signs Last 24 Hrs  T(C): 36.3, Max: 37.6 (06-20 @ 22:01)  T(F): 97.3, Max: 99.6 (06-20 @ 22:01)  HR: 63 (63 - 65)  BP: 133/56 (117/46 - 133/56)  BP(mean): --  RR: 17 (16 - 18)  SpO2: 98% (98% - 99%)    PHYSICAL EXAM:    GENERAL: alert, NAD, a+o x 0  HEAD:  Atraumatic, Normocephalic  EYES: EOMI, PERRLA  HEENT:dry mucous membranes  NECK: Supple, No JVD  NERVOUS SYSTEM: arousable. does not follow commands.   CHEST/LUNG: decreased bs at bases.  HEART: Regular rate and rhythm  ABDOMEN: Soft, Nontender, Nondistended; Bowel sounds present  GENITOURINARY- Voiding, no palpable bladder  EXTREMITIES:  No clubbing, cyanosis, or edema, L bka  MUSCULOSKELTAL- No muscle tenderness, No joint tenderness  SKIN-no rash    MEDICATIONS  (STANDING):  mirtazapine 15milliGRAM(s) Oral at bedtime  simvastatin 10milliGRAM(s) Oral at bedtime  clopidogrel Tablet 75milliGRAM(s) Oral daily  metoprolol 12.5milliGRAM(s) Oral two times a day  memantine 10milliGRAM(s) Oral two times a day  calcium carbonate 1250 mG + Vitamin D (OsCal 500 + D) 1Tablet(s) Oral two times a day  heparin  Injectable 5000Unit(s) SubCutaneous every 8 hours  insulin lispro (HumaLOG) corrective regimen sliding scale  SubCutaneous three times a day before meals  dextrose 5%. 1000milliLiter(s) IV Continuous <Continuous>  dextrose 50% Injectable 12.5Gram(s) IV Push once  dextrose 50% Injectable 25Gram(s) IV Push once  dextrose 50% Injectable 25Gram(s) IV Push once  docusate sodium 200milliGRAM(s) Oral daily  cefepime  IVPB 1000milliGRAM(s) IV Intermittent every 12 hours  vancomycin  IVPB  IV Intermittent   vancomycin  IVPB 500milliGRAM(s) IV Intermittent every 12 hours  diltiazem    Tablet 60milliGRAM(s) Oral every 6 hours  desmopressin Injectable 1MICROGram(s) SubCutaneous at bedtime  insulin glargine Injectable (LANTUS) 20Unit(s) SubCutaneous at bedtime  aspirin  chewable 81milliGRAM(s) Oral daily    MEDICATIONS  (PRN):  dextrose Gel 1Dose(s) Oral once PRN Blood Glucose LESS THAN 70 milliGRAM(s)/deciliter  glucagon  Injectable 1milliGRAM(s) IntraMuscular once PRN Glucose LESS THAN 70 milligrams/deciliter          06-21    153<H>  |  119<H>  |  11  ----------------------------<  127<H>  4.0   |  28  |  0.69    Ca    8.8      21 Jun 2017 04:32      CAPILLARY BLOOD GLUCOSE  78 (21 Jun 2017 13:15)  98 (21 Jun 2017 08:25)  228 (20 Jun 2017 22:56)  114 (20 Jun 2017 17:12)                        Assessment and Plan:   · Assessment		  83F, pmh as above a/w:    1. Multifocal pneumonia presumed aspiration, with bacteremia and UTI:   -ID eval appreciated -continue cefepime and vanco to cover for gram negative and positive bacteria.  -positive BCx for staph, abx per ID  -f/u echo and repeat BCx  -leukocytosis resolved.    2. Hypernatremia secondary to dehydration: IMPROVING.  -stop IVFs  -per nephro --> trial DDAVP, may have some degree chronic DI    Hypokalemia: RESOLVED.  -supplemental.    3. Metabolic encephalopathy in setting of dementia: RESOLVED, at baseline.  -continue to treat underlying cause  -supportive care.    4. h/o Atrial fibrillation with svt on tele while in ED  -in sinus here  -continue ccb/bb  -not on a/c    5. PVD s/p left bka:  -continue plavix    6. HTN:  -continue ccb/bb    7. Uncontrolled DM: Pt eating less and needs frequent insulin adjustment.  -hgba1c >10 on admission.  -adjust insulin for goal FS < 200    8. Dyslipidemia:  -continue statin    9. Advanced Dementia:  -continue namenda.   -supportive care.    10. DVT px    Attending Statement:  40 minutes spent on total encounter; more than 50% of the visit was spent counseling and/or coordinating care by the attending physician.
c/c: sob    HPI: . Hx obtained from Chart/available records as pt with dementia and unable to provide history.     84 y/o F with Pmhx of DM Type II on Insulin (LAst A1c 10.6), PVD s/p Left  BKA, dementia, Afib, Major depression, HTN who presented to the ED with C/o lethargy, cough.   Cough X 2 weeks, dry, nonproductive. She is alert at baseline but not oriented and has underlying dementia. Non verbal and requires help with feeding. Incontinent with bladder and bowel. There are also C/o nonbilious vomiting episode yesterday, for which she received IVF in the facility .She Had labs and CXR a week ago, which shows Na as 148 and Cr as 0.7 GFR was >60.  Was brought to ED because family concerned about patient being more lethargic. CT scan of chest showed possible b/l PNA.  Sodium was elevated at 165 (Corrected sodium was 169) which rapidly corrected to 155 (corrected ) after 300 ml of D5 given over 90 min in conjunction with Abx.  On telemetry she had multiple beat run of SVT. She was admitted to tele with Pneumonia/hypernatremia/dehydration and UTI.     6/18: pt seen and examined. chart/labs reviewed. Lethargic but arousable. Denies pain/sob.   6/19: Alert, comfortable. Unable to elicit any response.   6/20: More alert today, responding to basic questions. Otherwise pleasantly confused. Denies fever, chills, N, V, abd pain, CP, SOB.  6/21: Status quo. Not very responsive. Has low grade fevers. Otherwise comfortable.  6/22: Status quo. Not very responsive. Sodium normalized.      Review of system-unable to obtain due to dementia.    Vital Signs Last 24 Hrs  T(C): 36.4, Max: 37.4 (06-21 @ 21:49)  T(F): 97.6, Max: 99.3 (06-21 @ 21:49)  HR: 66 (60 - 104)  BP: 115/61 (115/61 - 125/47)  BP(mean): --  RR: 14 (14 - 17)  SpO2: 97% (94% - 97%)    PHYSICAL EXAM:    GENERAL: alert, NAD, a+o x 0  HEAD:  Atraumatic, Normocephalic  EYES: EOMI, PERRLA  HEENT:dry mucous membranes  NECK: Supple, No JVD  NERVOUS SYSTEM: arousable. does not follow commands.   CHEST/LUNG: decreased bs at bases.  HEART: Regular rate and rhythm  ABDOMEN: Soft, Nontender, Nondistended; Bowel sounds present  GENITOURINARY- Voiding, no palpable bladder  EXTREMITIES:  No clubbing, cyanosis, or edema, L bka  MUSCULOSKELTAL- No muscle tenderness, No joint tenderness  SKIN-no rash    MEDICATIONS  (STANDING):  mirtazapine 15milliGRAM(s) Oral at bedtime  simvastatin 10milliGRAM(s) Oral at bedtime  clopidogrel Tablet 75milliGRAM(s) Oral daily  metoprolol 12.5milliGRAM(s) Oral two times a day  memantine 10milliGRAM(s) Oral two times a day  calcium carbonate 1250 mG + Vitamin D (OsCal 500 + D) 1Tablet(s) Oral two times a day  insulin lispro (HumaLOG) corrective regimen sliding scale  SubCutaneous three times a day before meals  dextrose 5%. 1000milliLiter(s) IV Continuous <Continuous>  dextrose 50% Injectable 12.5Gram(s) IV Push once  dextrose 50% Injectable 25Gram(s) IV Push once  dextrose 50% Injectable 25Gram(s) IV Push once  docusate sodium 200milliGRAM(s) Oral daily  cefepime  IVPB 1000milliGRAM(s) IV Intermittent every 12 hours  vancomycin  IVPB  IV Intermittent   vancomycin  IVPB 500milliGRAM(s) IV Intermittent every 12 hours  diltiazem    Tablet 60milliGRAM(s) Oral every 6 hours  desmopressin Injectable 1MICROGram(s) SubCutaneous at bedtime  insulin glargine Injectable (LANTUS) 20Unit(s) SubCutaneous at bedtime  aspirin  chewable 81milliGRAM(s) Oral daily  heparin  Injectable 5000Unit(s) SubCutaneous every 12 hours    MEDICATIONS  (PRN):  dextrose Gel 1Dose(s) Oral once PRN Blood Glucose LESS THAN 70 milliGRAM(s)/deciliter  glucagon  Injectable 1milliGRAM(s) IntraMuscular once PRN Glucose LESS THAN 70 milligrams/deciliter                              12.4   6.8   )-----------( 130      ( 22 Jun 2017 05:43 )             39.7     06-22    145  |  x   |  x   ----------------------------<  x   x    |  x   |  x     Ca    8.9      22 Jun 2017 05:43      CAPILLARY BLOOD GLUCOSE  182 (22 Jun 2017 11:41)  162 (22 Jun 2017 08:05)  148 (22 Jun 2017 03:12)  215 (21 Jun 2017 21:51)  87 (21 Jun 2017 16:58)        Assessment and Plan:   · Assessment		  83F, pmh as above a/w:    1. Multifocal pneumonia presumed aspiration, with bacteremia and UTI:   -ID eval appreciated -continue cefepime and vanco to cover for gram negative and positive bacteria.  -positive BCx for staph, abx per ID  -f/u echo and repeat BCx  -leukocytosis resolved.    2. Hypernatremia secondary to dehydration: RESOLVED.  -stop IVFs  -per nephro --> trial DDAVP, may have some degree chronic DI.  Will discuss with family further work up.    Hypokalemia: RESOLVED.  -supplemental.    3. Metabolic encephalopathy in setting of dementia: RESOLVED, at baseline.  -continue to treat underlying cause  -supportive care.    4. h/o Atrial fibrillation with svt on tele while in ED  -in sinus here  -continue ccb/bb  -not on a/c    5. PVD s/p left bka:  -continue plavix    6. HTN:  -continue ccb/bb    7. Uncontrolled DM: Pt eating less and needs frequent insulin adjustment.  -hgba1c >10 on admission.  -adjust insulin for goal FS < 200    8. Dyslipidemia:  -continue statin    9. Advanced Dementia:  -continue namenda.   -supportive care.    10. DVT px    Attending Statement:  40 minutes spent on total encounter; more than 50% of the visit was spent counseling and/or coordinating care by the attending physician.
c/c: sob    HPI: . Hx obtained from Chart/available records as pt with dementia and unable to provide history.     84 y/o F with Pmhx of DM Type II on Insulin (LAst A1c 10.6), PVD s/p Left  BKA, dementia, Afib, Major depression, HTN who presented to the ED with C/o lethargy, cough.   Cough X 2 weeks, dry, nonproductive. She is alert at baseline but not oriented and has underlying dementia. Non verbal and requires help with feeding. Incontinent with bladder and bowel. There are also C/o nonbilious vomiting episode yesterday, for which she received IVF in the facility .She Had labs and CXR a week ago, which shows Na as 148 and Cr as 0.7 GFR was >60.  Was brought to ED because family concerned about patient being more lethargic. CT scan of chest showed possible b/l PNA.  Sodium was elevated at 165 (Corrected sodium was 169) which rapidly corrected to 155 (corrected ) after 300 ml of D5 given over 90 min in conjunction with Abx.  On telemetry she had multiple beat run of SVT. She was admitted to tele with Pneumonia/hypernatremia/dehydration and UTI.     6/18: pt seen and examined. chart/labs reviewed. Lethargic but arousable. Denies pain/sob.   6/19: Alert, comfortable. Unable to elicit any response.   6/20: More alert today, responding to basic questions. Otherwise pleasantly confused. Denies fever, chills, N, V, abd pain, CP, SOB.  6/21: Status quo. Not very responsive. Has low grade fevers. Otherwise comfortable.  6/22: Status quo. Not very responsive. Sodium normalized.  6/23: Pt status quo. Nurse states she has a cough and some left upper extremity edema.  Spoke with family via phone and addressed her concerns to the best of my ability.      Review of system-unable to obtain due to dementia.    Vital Signs Last 24 Hrs  T(C): 36.4, Max: 36.9 (06-23 @ 05:04)  T(F): 97.6, Max: 98.4 (06-23 @ 05:04)  HR: 66 (64 - 68)  BP: 130/49 (123/92 - 148/73)  BP(mean): --  RR: 18 (17 - 18)  SpO2: 99% (98% - 99%)    PHYSICAL EXAM:    GENERAL: alert, NAD, a+o x 0  HEAD:  Atraumatic, Normocephalic  EYES: EOMI, PERRLA  HEENT:dry mucous membranes  NECK: Supple, No JVD  NERVOUS SYSTEM: arousable. does not follow commands.   CHEST/LUNG: decreased bs at bases.  HEART: Regular rate and rhythm  ABDOMEN: Soft, Nontender, Nondistended; Bowel sounds present  GENITOURINARY- Voiding, no palpable bladder  EXTREMITIES:  No clubbing, cyanosis, or edema, L bka  MUSCULOSKELTAL- No muscle tenderness, No joint tenderness  SKIN-no rash    MEDICATIONS  (STANDING):  mirtazapine 15milliGRAM(s) Oral at bedtime  simvastatin 10milliGRAM(s) Oral at bedtime  clopidogrel Tablet 75milliGRAM(s) Oral daily  metoprolol 12.5milliGRAM(s) Oral two times a day  memantine 10milliGRAM(s) Oral two times a day  calcium carbonate 1250 mG + Vitamin D (OsCal 500 + D) 1Tablet(s) Oral two times a day  insulin lispro (HumaLOG) corrective regimen sliding scale  SubCutaneous three times a day before meals  dextrose 5%. 1000milliLiter(s) IV Continuous <Continuous>  dextrose 50% Injectable 12.5Gram(s) IV Push once  dextrose 50% Injectable 25Gram(s) IV Push once  dextrose 50% Injectable 25Gram(s) IV Push once  docusate sodium 200milliGRAM(s) Oral daily  cefepime  IVPB 1000milliGRAM(s) IV Intermittent every 12 hours  vancomycin  IVPB  IV Intermittent   vancomycin  IVPB 500milliGRAM(s) IV Intermittent every 12 hours  diltiazem    Tablet 60milliGRAM(s) Oral every 6 hours  desmopressin Injectable 1MICROGram(s) SubCutaneous at bedtime  insulin glargine Injectable (LANTUS) 20Unit(s) SubCutaneous at bedtime  aspirin  chewable 81milliGRAM(s) Oral daily  heparin  Injectable 5000Unit(s) SubCutaneous every 12 hours    MEDICATIONS  (PRN):  dextrose Gel 1Dose(s) Oral once PRN Blood Glucose LESS THAN 70 milliGRAM(s)/deciliter  glucagon  Injectable 1milliGRAM(s) IntraMuscular once PRN Glucose LESS THAN 70 milligrams/deciliter                              12.4   6.8   )-----------( 130      ( 22 Jun 2017 05:43 )             39.7     06-22    145  |  x   |  x   ----------------------------<  x   x    |  x   |  x     Ca    8.9      22 Jun 2017 05:43      CAPILLARY BLOOD GLUCOSE  178 (23 Jun 2017 11:48)  167 (23 Jun 2017 07:49)  291 (22 Jun 2017 21:41)  195 (22 Jun 2017 18:26)      Assessment and Plan:   · Assessment		  83F, pmh as above a/w:    Multifocal pneumonia presumed aspiration, with bacteremia and UTI:   -ID eval appreciated -continue cefepime and vanco to cover for gram negative and positive bacteria.  -positive BCx for staph, abx per ID  -repeat blood cultures negative  -repeat xray 6/22 --> Bibasilar atelectasis with medial right lower lobe airspace disease, question pneumonia.  -leukocytosis resolved.    Left upper extremity edema: Dependent - multifactorial, immobility hypoalbuminemia/poor nutrition  -doppler negative for DVT  -echo pending.    Hypernatremia secondary to dehydration and some degree chronic DI: RESOLVED.  -stop IVFs  -per nephro --> c/w DDAVP.  Will need intranasal on discharge.  -CT head negative for acute pathology.    Hypokalemia: RESOLVED.  -supplemental.    Metabolic encephalopathy in setting of advanced dementia: RESOLVED, at baseline.  -continue to treat underlying cause  -continue namenda.  -supportive care.     h/o Atrial fibrillation with svt on tele while in ED  -in sinus here  -continue ccb/bb  -not on a/c    PVD s/p left bka:  -continue plavix    HTN:  -continue ccb/bb    Uncontrolled DM: Pt eating less and needs frequent insulin adjustment.  -hgba1c >10 on admission.  -adjust insulin for goal FS < 200     Dyslipidemia:  -continue statin     DVT px    Attending Statement:  40 minutes spent on total encounter; more than 50% of the visit was spent counseling and/or coordinating care by the attending physician.

## 2017-06-27 NOTE — PROGRESS NOTE ADULT - PROVIDER SPECIALTY LIST ADULT
Hospitalist
Infectious Disease
Nephrology

## 2017-06-27 NOTE — PROGRESS NOTE ADULT - ASSESSMENT
83F, pmh as above a/w:  # Multifocal pneumonia presumed aspiration, with bacteremia and UTI:   -positive BCx for staph, abx per ID- completed IV antibiotic   -repeat blood cultures negative  -completed cefepime and vancomycin   - ID consult appreciated  -repeat xray 6/22 --> Bibasilar atelectasis with medial right lower lobe airspace disease, question pneumonia.  -leukocytosis resolved.    Left upper extremity edema: Dependent - multifactorial, immobility hypoalbuminemia/poor nutrition  -doppler negative for DVT    Hypernatremia secondary to dehydration and some degree chronic DI: RESOLVED.  -off IVFs  -per nephro --> c/w DDAVP.  Will need intranasal on discharge.- on Intranasal DDAVP as per nephrology recommendations- will need close f/u as an outpatient - avoid overcorrection of NA- recommend repeat Na level in 1-3 days  - f/u with neprhologist  -CT head negative for acute pathology.    Hypokalemia: RESOLVED.  -supplemental.    Metabolic encephalopathy in setting of advanced dementia: RESOLVED  -continue to treat underlying cause  -continue namenda.  -supportive care.     h/o Atrial fibrillation with svt on tele while in ED  -in sinus here  -continue ccb/bb  -not on a/c    PVD s/p left bka:  -continue plavix    HTN:  -continue diltiazem and lopressor    Uncontrolled DM: Pt eating less and needs frequent insulin adjustment.  -hgba1c >10 on admission.  -adjust insulin for goal FS < 200     Dyslipidemia:  -continue statin

## 2017-06-30 DIAGNOSIS — Z91.048 OTHER NONMEDICINAL SUBSTANCE ALLERGY STATUS: ICD-10-CM

## 2017-06-30 DIAGNOSIS — E86.0 DEHYDRATION: ICD-10-CM

## 2017-06-30 DIAGNOSIS — E11.65 TYPE 2 DIABETES MELLITUS WITH HYPERGLYCEMIA: ICD-10-CM

## 2017-06-30 DIAGNOSIS — I47.1 SUPRAVENTRICULAR TACHYCARDIA: ICD-10-CM

## 2017-06-30 DIAGNOSIS — F03.90 UNSPECIFIED DEMENTIA WITHOUT BEHAVIORAL DISTURBANCE: ICD-10-CM

## 2017-06-30 DIAGNOSIS — Z79.4 LONG TERM (CURRENT) USE OF INSULIN: ICD-10-CM

## 2017-06-30 DIAGNOSIS — Z89.512 ACQUIRED ABSENCE OF LEFT LEG BELOW KNEE: ICD-10-CM

## 2017-06-30 DIAGNOSIS — F32.9 MAJOR DEPRESSIVE DISORDER, SINGLE EPISODE, UNSPECIFIED: ICD-10-CM

## 2017-06-30 DIAGNOSIS — E87.0 HYPEROSMOLALITY AND HYPERNATREMIA: ICD-10-CM

## 2017-06-30 DIAGNOSIS — R32 UNSPECIFIED URINARY INCONTINENCE: ICD-10-CM

## 2017-06-30 DIAGNOSIS — N39.0 URINARY TRACT INFECTION, SITE NOT SPECIFIED: ICD-10-CM

## 2017-06-30 DIAGNOSIS — Z88.2 ALLERGY STATUS TO SULFONAMIDES: ICD-10-CM

## 2017-06-30 DIAGNOSIS — I10 ESSENTIAL (PRIMARY) HYPERTENSION: ICD-10-CM

## 2017-06-30 DIAGNOSIS — J69.0 PNEUMONITIS DUE TO INHALATION OF FOOD AND VOMIT: ICD-10-CM

## 2017-06-30 DIAGNOSIS — E87.6 HYPOKALEMIA: ICD-10-CM

## 2017-06-30 DIAGNOSIS — R15.9 FULL INCONTINENCE OF FECES: ICD-10-CM

## 2017-06-30 DIAGNOSIS — A41.1 SEPSIS DUE TO OTHER SPECIFIED STAPHYLOCOCCUS: ICD-10-CM

## 2017-06-30 DIAGNOSIS — I73.9 PERIPHERAL VASCULAR DISEASE, UNSPECIFIED: ICD-10-CM

## 2017-06-30 DIAGNOSIS — I48.91 UNSPECIFIED ATRIAL FIBRILLATION: ICD-10-CM

## 2017-06-30 DIAGNOSIS — G93.41 METABOLIC ENCEPHALOPATHY: ICD-10-CM

## 2017-06-30 DIAGNOSIS — R05 COUGH: ICD-10-CM

## 2017-07-03 DIAGNOSIS — R78.81 BACTEREMIA: ICD-10-CM

## 2017-09-12 NOTE — DISCHARGE NOTE ADULT - FUNCTIONAL SCREEN CURRENT LEVEL: BATHING, MLM
Med: Warfarin  Dosage: 2.5mg  Si tablet daily  Quantity requested:  100  Form Requested: no  ID Number:  Not listed on fax  Insurance Phone Number: Not listed on fax  Go to [email or web address] and click \"Enter a Key\".  Key: [id number]    · Preferred pharmacy has been set up and verified.    · Original PA has been given to Phone Triage Nurse/MARQUIS Herrera.       (2) assistive person

## 2018-03-01 RX ORDER — ASPIRIN/CALCIUM CARB/MAGNESIUM 324 MG
1 TABLET ORAL
Qty: 0 | Refills: 0 | COMMUNITY

## 2018-03-01 RX ORDER — FAMOTIDINE 10 MG/ML
1 INJECTION INTRAVENOUS
Qty: 0 | Refills: 0 | COMMUNITY

## 2018-03-01 RX ORDER — DILTIAZEM HCL 120 MG
1 CAPSULE, EXT RELEASE 24 HR ORAL
Qty: 0 | Refills: 0 | COMMUNITY

## 2018-03-01 RX ORDER — POTASSIUM CHLORIDE 20 MEQ
1 PACKET (EA) ORAL
Qty: 0 | Refills: 0 | COMMUNITY

## 2018-03-01 RX ORDER — FUROSEMIDE 40 MG
1 TABLET ORAL
Qty: 0 | Refills: 0 | COMMUNITY

## 2019-02-15 RX ORDER — AMOXICILLIN 250 MG/5ML
1 SUSPENSION, RECONSTITUTED, ORAL (ML) ORAL
Qty: 0 | Refills: 0 | COMMUNITY
Start: 2019-02-15 | End: 2019-02-20

## 2019-02-18 ENCOUNTER — INPATIENT (INPATIENT)
Facility: HOSPITAL | Age: 84
LOS: 15 days | Discharge: NURSING, REHAB OR LONG-TERM CARE | End: 2019-03-06
Payer: MEDICARE

## 2019-02-18 VITALS
HEART RATE: 81 BPM | OXYGEN SATURATION: 95 % | SYSTOLIC BLOOD PRESSURE: 122 MMHG | DIASTOLIC BLOOD PRESSURE: 57 MMHG | RESPIRATION RATE: 15 BRPM

## 2019-02-18 DIAGNOSIS — Z89.512 ACQUIRED ABSENCE OF LEFT LEG BELOW KNEE: Chronic | ICD-10-CM

## 2019-02-18 PROBLEM — E78.5 HYPERLIPIDEMIA, UNSPECIFIED: Chronic | Status: ACTIVE | Noted: 2017-06-17

## 2019-02-18 PROBLEM — I48.91 UNSPECIFIED ATRIAL FIBRILLATION: Chronic | Status: ACTIVE | Noted: 2017-06-17

## 2019-02-18 PROBLEM — I73.9 PERIPHERAL VASCULAR DISEASE, UNSPECIFIED: Chronic | Status: ACTIVE | Noted: 2017-06-17

## 2019-02-18 PROBLEM — F03.90 UNSPECIFIED DEMENTIA WITHOUT BEHAVIORAL DISTURBANCE: Chronic | Status: ACTIVE | Noted: 2017-06-17

## 2019-02-18 PROBLEM — F32.9 MAJOR DEPRESSIVE DISORDER, SINGLE EPISODE, UNSPECIFIED: Chronic | Status: ACTIVE | Noted: 2017-06-17

## 2019-02-18 PROBLEM — E11.9 TYPE 2 DIABETES MELLITUS WITHOUT COMPLICATIONS: Chronic | Status: ACTIVE | Noted: 2017-06-17

## 2019-02-18 PROBLEM — I10 ESSENTIAL (PRIMARY) HYPERTENSION: Chronic | Status: ACTIVE | Noted: 2017-06-17

## 2019-02-18 LAB
ALBUMIN SERPL ELPH-MCNC: 1.8 G/DL — LOW (ref 3.3–5)
ALP SERPL-CCNC: 125 U/L — HIGH (ref 40–120)
ALT FLD-CCNC: 21 U/L — SIGNIFICANT CHANGE UP (ref 12–78)
ANION GAP SERPL CALC-SCNC: 6 MMOL/L — SIGNIFICANT CHANGE UP (ref 5–17)
APPEARANCE UR: ABNORMAL
APTT BLD: 26.4 SEC — LOW (ref 27.5–36.3)
AST SERPL-CCNC: 29 U/L — SIGNIFICANT CHANGE UP (ref 15–37)
BACTERIA # UR AUTO: ABNORMAL
BASE EXCESS BLDV CALC-SCNC: 2.2 MMOL/L — HIGH (ref -2–2)
BILIRUB SERPL-MCNC: 0.4 MG/DL — SIGNIFICANT CHANGE UP (ref 0.2–1.2)
BILIRUB UR-MCNC: NEGATIVE — SIGNIFICANT CHANGE UP
BUN SERPL-MCNC: 19 MG/DL — SIGNIFICANT CHANGE UP (ref 7–23)
CALCIUM SERPL-MCNC: 8.5 MG/DL — SIGNIFICANT CHANGE UP (ref 8.5–10.1)
CHLORIDE SERPL-SCNC: 109 MMOL/L — HIGH (ref 96–108)
CO2 SERPL-SCNC: 28 MMOL/L — SIGNIFICANT CHANGE UP (ref 22–31)
COLOR SPEC: YELLOW — SIGNIFICANT CHANGE UP
COMMENT - URINE: SIGNIFICANT CHANGE UP
CREAT SERPL-MCNC: 0.99 MG/DL — SIGNIFICANT CHANGE UP (ref 0.5–1.3)
DIFF PNL FLD: ABNORMAL
EPI CELLS # UR: SIGNIFICANT CHANGE UP
GLUCOSE SERPL-MCNC: 214 MG/DL — HIGH (ref 70–99)
GLUCOSE UR QL: NEGATIVE MG/DL — SIGNIFICANT CHANGE UP
GRAN CASTS # UR COMP ASSIST: ABNORMAL /LPF
HCO3 BLDV-SCNC: 27 MMOL/L — SIGNIFICANT CHANGE UP (ref 21–29)
HCT VFR BLD CALC: 37.9 % — SIGNIFICANT CHANGE UP (ref 34.5–45)
HGB BLD-MCNC: 11.7 G/DL — SIGNIFICANT CHANGE UP (ref 11.5–15.5)
INR BLD: 1.36 RATIO — HIGH (ref 0.88–1.16)
KETONES UR-MCNC: ABNORMAL
LACTATE SERPL-SCNC: 1.6 MMOL/L — SIGNIFICANT CHANGE UP (ref 0.7–2)
LEUKOCYTE ESTERASE UR-ACNC: ABNORMAL
MCHC RBC-ENTMCNC: 28.4 PG — SIGNIFICANT CHANGE UP (ref 27–34)
MCHC RBC-ENTMCNC: 30.9 GM/DL — LOW (ref 32–36)
MCV RBC AUTO: 92 FL — SIGNIFICANT CHANGE UP (ref 80–100)
NITRITE UR-MCNC: NEGATIVE — SIGNIFICANT CHANGE UP
NRBC # BLD: 0 /100 WBCS — SIGNIFICANT CHANGE UP (ref 0–0)
PCO2 BLDV: 44 MMHG — SIGNIFICANT CHANGE UP (ref 35–50)
PH BLDV: 7.4 — SIGNIFICANT CHANGE UP (ref 7.35–7.45)
PH UR: 5 — SIGNIFICANT CHANGE UP (ref 5–8)
PLATELET # BLD AUTO: 309 K/UL — SIGNIFICANT CHANGE UP (ref 150–400)
PO2 BLDV: 150 MMHG — HIGH (ref 25–45)
POTASSIUM SERPL-MCNC: 4.3 MMOL/L — SIGNIFICANT CHANGE UP (ref 3.5–5.3)
POTASSIUM SERPL-SCNC: 4.3 MMOL/L — SIGNIFICANT CHANGE UP (ref 3.5–5.3)
PROT SERPL-MCNC: 7.4 GM/DL — SIGNIFICANT CHANGE UP (ref 6–8.3)
PROT UR-MCNC: 100 MG/DL
PROTHROM AB SERPL-ACNC: 15.2 SEC — HIGH (ref 10–12.9)
RAPID RVP RESULT: SIGNIFICANT CHANGE UP
RBC # BLD: 4.12 M/UL — SIGNIFICANT CHANGE UP (ref 3.8–5.2)
RBC # FLD: 14.1 % — SIGNIFICANT CHANGE UP (ref 10.3–14.5)
RBC CASTS # UR COMP ASSIST: ABNORMAL /HPF (ref 0–4)
SAO2 % BLDV: 99 % — HIGH (ref 67–88)
SODIUM SERPL-SCNC: 143 MMOL/L — SIGNIFICANT CHANGE UP (ref 135–145)
SP GR SPEC: 1.02 — SIGNIFICANT CHANGE UP (ref 1.01–1.02)
UROBILINOGEN FLD QL: NEGATIVE MG/DL — SIGNIFICANT CHANGE UP
WBC # BLD: 15.23 K/UL — HIGH (ref 3.8–10.5)
WBC # FLD AUTO: 15.23 K/UL — HIGH (ref 3.8–10.5)
WBC UR QL: ABNORMAL

## 2019-02-18 PROCEDURE — 99285 EMERGENCY DEPT VISIT HI MDM: CPT

## 2019-02-18 PROCEDURE — 99285 EMERGENCY DEPT VISIT HI MDM: CPT | Mod: 25

## 2019-02-18 PROCEDURE — 93010 ELECTROCARDIOGRAM REPORT: CPT

## 2019-02-18 PROCEDURE — 70450 CT HEAD/BRAIN W/O DYE: CPT | Mod: 26

## 2019-02-18 PROCEDURE — 71045 X-RAY EXAM CHEST 1 VIEW: CPT | Mod: 26

## 2019-02-18 RX ORDER — METOPROLOL TARTRATE 50 MG
12.5 TABLET ORAL
Qty: 0 | Refills: 0 | Status: DISCONTINUED | OUTPATIENT
Start: 2019-02-18 | End: 2019-03-06

## 2019-02-18 RX ORDER — VANCOMYCIN HCL 1 G
1000 VIAL (EA) INTRAVENOUS ONCE
Qty: 0 | Refills: 0 | Status: COMPLETED | OUTPATIENT
Start: 2019-02-18 | End: 2019-02-18

## 2019-02-18 RX ORDER — MIRTAZAPINE 45 MG/1
15 TABLET, ORALLY DISINTEGRATING ORAL AT BEDTIME
Qty: 0 | Refills: 0 | Status: DISCONTINUED | OUTPATIENT
Start: 2019-02-18 | End: 2019-03-06

## 2019-02-18 RX ORDER — INSULIN DETEMIR 100/ML (3)
35 INSULIN PEN (ML) SUBCUTANEOUS
Qty: 0 | Refills: 0 | COMMUNITY

## 2019-02-18 RX ORDER — DEXTROSE 50 % IN WATER 50 %
12.5 SYRINGE (ML) INTRAVENOUS ONCE
Qty: 0 | Refills: 0 | Status: DISCONTINUED | OUTPATIENT
Start: 2019-02-18 | End: 2019-03-06

## 2019-02-18 RX ORDER — CEFTRIAXONE 500 MG/1
1 INJECTION, POWDER, FOR SOLUTION INTRAMUSCULAR; INTRAVENOUS ONCE
Qty: 0 | Refills: 0 | Status: DISCONTINUED | OUTPATIENT
Start: 2019-02-18 | End: 2019-02-18

## 2019-02-18 RX ORDER — SODIUM CHLORIDE 9 MG/ML
1850 INJECTION INTRAMUSCULAR; INTRAVENOUS; SUBCUTANEOUS ONCE
Qty: 0 | Refills: 0 | Status: COMPLETED | OUTPATIENT
Start: 2019-02-18 | End: 2019-02-18

## 2019-02-18 RX ORDER — PIPERACILLIN AND TAZOBACTAM 4; .5 G/20ML; G/20ML
3.38 INJECTION, POWDER, LYOPHILIZED, FOR SOLUTION INTRAVENOUS EVERY 8 HOURS
Qty: 0 | Refills: 0 | Status: DISCONTINUED | OUTPATIENT
Start: 2019-02-18 | End: 2019-03-06

## 2019-02-18 RX ORDER — CLOPIDOGREL BISULFATE 75 MG/1
1 TABLET, FILM COATED ORAL
Qty: 0 | Refills: 0 | COMMUNITY

## 2019-02-18 RX ORDER — GLUCAGON INJECTION, SOLUTION 0.5 MG/.1ML
1 INJECTION, SOLUTION SUBCUTANEOUS ONCE
Qty: 0 | Refills: 0 | Status: DISCONTINUED | OUTPATIENT
Start: 2019-02-18 | End: 2019-03-06

## 2019-02-18 RX ORDER — SODIUM CHLORIDE 9 MG/ML
1000 INJECTION INTRAMUSCULAR; INTRAVENOUS; SUBCUTANEOUS
Qty: 0 | Refills: 0 | Status: DISCONTINUED | OUTPATIENT
Start: 2019-02-18 | End: 2019-03-01

## 2019-02-18 RX ORDER — ACETAMINOPHEN 500 MG
650 TABLET ORAL EVERY 6 HOURS
Qty: 0 | Refills: 0 | Status: DISCONTINUED | OUTPATIENT
Start: 2019-02-18 | End: 2019-03-06

## 2019-02-18 RX ORDER — VERAPAMIL HCL 240 MG
80 CAPSULE, EXTENDED RELEASE PELLETS 24 HR ORAL DAILY
Qty: 0 | Refills: 0 | Status: DISCONTINUED | OUTPATIENT
Start: 2019-02-18 | End: 2019-03-06

## 2019-02-18 RX ORDER — INSULIN LISPRO 100/ML
VIAL (ML) SUBCUTANEOUS AT BEDTIME
Qty: 0 | Refills: 0 | Status: DISCONTINUED | OUTPATIENT
Start: 2019-02-18 | End: 2019-03-06

## 2019-02-18 RX ORDER — INSULIN ASPART 100 [IU]/ML
15 INJECTION, SOLUTION SUBCUTANEOUS
Qty: 0 | Refills: 0 | COMMUNITY

## 2019-02-18 RX ORDER — DEXTROSE 50 % IN WATER 50 %
25 SYRINGE (ML) INTRAVENOUS ONCE
Qty: 0 | Refills: 0 | Status: DISCONTINUED | OUTPATIENT
Start: 2019-02-18 | End: 2019-03-06

## 2019-02-18 RX ORDER — HEPARIN SODIUM 5000 [USP'U]/ML
5000 INJECTION INTRAVENOUS; SUBCUTANEOUS EVERY 8 HOURS
Qty: 0 | Refills: 0 | Status: DISCONTINUED | OUTPATIENT
Start: 2019-02-18 | End: 2019-03-05

## 2019-02-18 RX ORDER — ZINC SULFATE TAB 220 MG (50 MG ZINC EQUIVALENT) 220 (50 ZN) MG
220 TAB ORAL DAILY
Qty: 0 | Refills: 0 | Status: DISCONTINUED | OUTPATIENT
Start: 2019-02-18 | End: 2019-03-06

## 2019-02-18 RX ORDER — ASPIRIN/CALCIUM CARB/MAGNESIUM 324 MG
81 TABLET ORAL DAILY
Qty: 0 | Refills: 0 | Status: DISCONTINUED | OUTPATIENT
Start: 2019-02-18 | End: 2019-03-06

## 2019-02-18 RX ORDER — ACETAMINOPHEN 500 MG
650 TABLET ORAL ONCE
Qty: 0 | Refills: 0 | Status: DISCONTINUED | OUTPATIENT
Start: 2019-02-18 | End: 2019-03-06

## 2019-02-18 RX ORDER — SIMVASTATIN 20 MG/1
1 TABLET, FILM COATED ORAL
Qty: 0 | Refills: 0 | COMMUNITY

## 2019-02-18 RX ORDER — INSULIN LISPRO 100/ML
VIAL (ML) SUBCUTANEOUS
Qty: 0 | Refills: 0 | Status: DISCONTINUED | OUTPATIENT
Start: 2019-02-18 | End: 2019-03-06

## 2019-02-18 RX ORDER — ASPIRIN/CALCIUM CARB/MAGNESIUM 324 MG
325 TABLET ORAL DAILY
Qty: 0 | Refills: 0 | Status: DISCONTINUED | OUTPATIENT
Start: 2019-02-18 | End: 2019-02-18

## 2019-02-18 RX ORDER — ASCORBIC ACID 60 MG
500 TABLET,CHEWABLE ORAL DAILY
Qty: 0 | Refills: 0 | Status: DISCONTINUED | OUTPATIENT
Start: 2019-02-18 | End: 2019-03-06

## 2019-02-18 RX ORDER — MEMANTINE HYDROCHLORIDE 10 MG/1
10 TABLET ORAL
Qty: 0 | Refills: 0 | Status: DISCONTINUED | OUTPATIENT
Start: 2019-02-18 | End: 2019-03-06

## 2019-02-18 RX ORDER — IBUPROFEN 200 MG
600 TABLET ORAL ONCE
Qty: 0 | Refills: 0 | Status: COMPLETED | OUTPATIENT
Start: 2019-02-18 | End: 2019-02-18

## 2019-02-18 RX ORDER — DOCUSATE SODIUM 100 MG
100 CAPSULE ORAL
Qty: 0 | Refills: 0 | Status: DISCONTINUED | OUTPATIENT
Start: 2019-02-18 | End: 2019-03-06

## 2019-02-18 RX ORDER — INSULIN DETEMIR 100/ML (3)
45 INSULIN PEN (ML) SUBCUTANEOUS DAILY
Qty: 0 | Refills: 0 | Status: DISCONTINUED | OUTPATIENT
Start: 2019-02-18 | End: 2019-02-18

## 2019-02-18 RX ORDER — MENTHOL AND METHYL SALICYLATE 10; 30 G/100G; G/100G
0 CREAM TOPICAL
Qty: 0 | Refills: 0 | COMMUNITY

## 2019-02-18 RX ORDER — INSULIN GLARGINE 100 [IU]/ML
45 INJECTION, SOLUTION SUBCUTANEOUS AT BEDTIME
Qty: 0 | Refills: 0 | Status: DISCONTINUED | OUTPATIENT
Start: 2019-02-18 | End: 2019-02-19

## 2019-02-18 RX ORDER — ATORVASTATIN CALCIUM 80 MG/1
40 TABLET, FILM COATED ORAL AT BEDTIME
Qty: 0 | Refills: 0 | Status: DISCONTINUED | OUTPATIENT
Start: 2019-02-18 | End: 2019-03-06

## 2019-02-18 RX ORDER — SODIUM CHLORIDE 9 MG/ML
1000 INJECTION, SOLUTION INTRAVENOUS
Qty: 0 | Refills: 0 | Status: DISCONTINUED | OUTPATIENT
Start: 2019-02-18 | End: 2019-03-06

## 2019-02-18 RX ORDER — DESMOPRESSIN ACETATE 0.1 MG/1
1 TABLET ORAL DAILY
Qty: 0 | Refills: 0 | Status: DISCONTINUED | OUTPATIENT
Start: 2019-02-18 | End: 2019-03-06

## 2019-02-18 RX ORDER — LANOLIN ALCOHOL/MO/W.PET/CERES
3 CREAM (GRAM) TOPICAL AT BEDTIME
Qty: 0 | Refills: 0 | Status: DISCONTINUED | OUTPATIENT
Start: 2019-02-18 | End: 2019-03-06

## 2019-02-18 RX ORDER — CEFTRIAXONE 500 MG/1
1000 INJECTION, POWDER, FOR SOLUTION INTRAMUSCULAR; INTRAVENOUS ONCE
Qty: 0 | Refills: 0 | Status: COMPLETED | OUTPATIENT
Start: 2019-02-18 | End: 2019-02-18

## 2019-02-18 RX ORDER — DEXTROSE 50 % IN WATER 50 %
15 SYRINGE (ML) INTRAVENOUS ONCE
Qty: 0 | Refills: 0 | Status: DISCONTINUED | OUTPATIENT
Start: 2019-02-18 | End: 2019-03-06

## 2019-02-18 RX ADMIN — Medication 600 MILLIGRAM(S): at 20:00

## 2019-02-18 RX ADMIN — HEPARIN SODIUM 5000 UNIT(S): 5000 INJECTION INTRAVENOUS; SUBCUTANEOUS at 23:20

## 2019-02-18 RX ADMIN — Medication 100 MILLIGRAM(S): at 17:52

## 2019-02-18 RX ADMIN — CEFTRIAXONE 1000 MILLIGRAM(S): 500 INJECTION, POWDER, FOR SOLUTION INTRAMUSCULAR; INTRAVENOUS at 14:40

## 2019-02-18 RX ADMIN — Medication 250 MILLIGRAM(S): at 17:52

## 2019-02-18 RX ADMIN — SODIUM CHLORIDE 75 MILLILITER(S): 9 INJECTION INTRAMUSCULAR; INTRAVENOUS; SUBCUTANEOUS at 23:18

## 2019-02-18 RX ADMIN — MEMANTINE HYDROCHLORIDE 10 MILLIGRAM(S): 10 TABLET ORAL at 17:52

## 2019-02-18 RX ADMIN — Medication 12.5 MILLIGRAM(S): at 19:30

## 2019-02-18 RX ADMIN — SODIUM CHLORIDE 1850 MILLILITER(S): 9 INJECTION INTRAMUSCULAR; INTRAVENOUS; SUBCUTANEOUS at 11:18

## 2019-02-18 RX ADMIN — ATORVASTATIN CALCIUM 40 MILLIGRAM(S): 80 TABLET, FILM COATED ORAL at 23:20

## 2019-02-18 RX ADMIN — Medication 600 MILLIGRAM(S): at 17:51

## 2019-02-18 RX ADMIN — PIPERACILLIN AND TAZOBACTAM 25 GRAM(S): 4; .5 INJECTION, POWDER, LYOPHILIZED, FOR SOLUTION INTRAVENOUS at 23:20

## 2019-02-18 RX ADMIN — Medication 650 MILLIGRAM(S): at 14:27

## 2019-02-18 RX ADMIN — SODIUM CHLORIDE 1850 MILLILITER(S): 9 INJECTION INTRAMUSCULAR; INTRAVENOUS; SUBCUTANEOUS at 10:47

## 2019-02-18 RX ADMIN — Medication 500 MILLIGRAM(S): at 17:52

## 2019-02-18 RX ADMIN — Medication 1 TABLET(S): at 23:32

## 2019-02-18 RX ADMIN — Medication 3 MILLIGRAM(S): at 23:20

## 2019-02-18 RX ADMIN — MIRTAZAPINE 15 MILLIGRAM(S): 45 TABLET, ORALLY DISINTEGRATING ORAL at 23:20

## 2019-02-18 RX ADMIN — Medication 650 MILLIGRAM(S): at 15:37

## 2019-02-18 RX ADMIN — ZINC SULFATE TAB 220 MG (50 MG ZINC EQUIVALENT) 220 MILLIGRAM(S): 220 (50 ZN) TAB at 17:52

## 2019-02-18 RX ADMIN — INSULIN GLARGINE 45 UNIT(S): 100 INJECTION, SOLUTION SUBCUTANEOUS at 23:32

## 2019-02-18 NOTE — CONSULT NOTE ADULT - ASSESSMENT
Patient is a  82 y/o F with PMHx of DM Type II on Insulin, PVD s/p Left  BKA, dementia, Hypernatremia, Afib on ASA 81mg, Major depression, HTN who presented to the ED with new onset L facial droop, LUE weakness that started around 8am. HCT neg for hemorrhage or acute infarct.     A/P: acute stroke vs febrile syndrome   - Admit to telemetry  - No IV tpa given at this time. Fever workup in progress. In discussion with patient Daughter Deepti she deferred tpa and expressed understanding of the risks and benefits of tpa use.  - Follow up Blood Cultures  - Cardio consult  - TTE  - Continue ASA 81mg  - Dysphagia screen  - Full dose Statin  - SQH, SCDs for DVT prophylaxis  - MRI/A brain-carotids w/o gado    Discussed with Dr. Suresh

## 2019-02-18 NOTE — CONSULT NOTE ADULT - SUBJECTIVE AND OBJECTIVE BOX
HPI:  Patient is a  82 y/o F with Pmhx of DM Type II on Insulin, PVD s/p Left  BKA, dementia, Hypernatremia, Afib on ASA 81mg, Major depression, HTN who presented to the ED with new onset L facial droop, LUE weakness that started around 8am. Patient arrival HH ED at 0946. Spoke with patient Daughter Deepti as patient unable to provide history. Patient aide noticed at 8am patient with left sided facial dropping, LUE weakness which is new for patient. At baseline, patient wheelchair bound at nursing home facility with advanced dementia, limited communication. Patient daughter states patient has had generalized weakness that she has noticed over the past few weeks. Febrile in ED Tm 101.9.    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      Vital Signs Last 24 Hrs  T(C): 38.8 (18 Feb 2019 09:50), Max: 38.8 (18 Feb 2019 09:50)  T(F): 101.9 (18 Feb 2019 09:50), Max: 101.9 (18 Feb 2019 09:50)  HR: 81 (18 Feb 2019 09:46) (81 - 81)  BP: 122/57 (18 Feb 2019 09:46) (122/57 - 122/57)  BP(mean): --  RR: 15 (18 Feb 2019 09:46) (15 - 15)  SpO2: 95% (18 Feb 2019 09:46) (95% - 95%)      PHYSICAL EXAM:  Constitutional: awake, unable to follow simple commands  HEENT: PERRLA, EOMI  Neck: Supple.  Respiratory: Breath sounds are clear bilaterally  Cardiovascular: S1 and S2, regular  rhythm  Gastrointestinal: soft, nontender  Extremities:  no edema  Vascular: Caritid Bruit - no  Musculoskeletal: no joint swelling/tenderness, no abnormal movements  Skin: No rashes    Neurological exam:  HF: awake, drowsy, unable to follow simple commands  CN: ILENE, EOMI,  Palate moves equally, SCM equal bilaterally  Motor: RUE lifts antigravity spontaneously, LUE 2/5 increased tone  L BKA, RLE 2/5  Sens: w/d to noxious on R side, LUE  Reflexes: upgoing toes R LE  Coord:  unable to assess  Gait/Balance: Cannot test    NIHSS: 18          Labs:                  Radiology report:  CT Brain Stroke Protocol (02.18.19 @ 09:59)  IMPRESSION:   No acute intracranial hemorrhage, mass effect, or midline shift.

## 2019-02-18 NOTE — ED PROVIDER NOTE - SKIN, MLM
Skin normal color for race, warm, dry and intact. No evidence of rash.  Well healed AKA site, dry w/o oozing or bandages.

## 2019-02-18 NOTE — ED PROVIDER NOTE - OBJECTIVE STATEMENT
PMHx of Afib, pacemaker, Dementia, DM, HTN, HLD, PVD, Depression, GERD ns/p AKA presents to the ED BIBA from [facility] for AMS w/ concern for CVA. Pt was seen at 8AM by staff and found to be at baseline. PTA pt found to be non-responsive to verbal commands.  Pt has left sided weakeness and decreased muscle strength on the left. Pt has signed MOLST form, attempt CPR, no DNR.  Unable to obtain a complete history as pt has h/o dementia and is non-verbal.  Pt on 81mg ASA daily and Plavix. Allergy to iodine and sulfa drugs.

## 2019-02-18 NOTE — H&P ADULT - ASSESSMENT
85F with acute CVA and fever      *Acute CVA 85F with acute CVA and fever      *Acute/Subacute CVA  -left sided facial droop and LUE weakness, s/p left AKA  -Neuro Cx  -ASA  -Lipitor  -TTE  -Cardiology eval  -speech and swallow/dysphagia eval  -fall px, aspiration px  -PT eval  -BP control  -MRI brain, MRA head and neck      *Fever, Stage 4 sacral ulcer; r/o OM, aspiration PNA.   -recently on Amoxicillin for dental infection (2 more days of prescription remains)  -empiric broad spectrum abx s/p Vanco and Rocephin in ED, c/w zosyn   -ID Cx  -f/u all cultures, blood/urine  -UA not strongly suggestive of UTI  -f/u CXR  -f/u MRI pelvis to assess for OM  -tylenol prn fever  -gentle IVF hydration  -rvp (-)      *AFib, not on AC  -unsure why patient is not anticoagulated, NH unaure, daughter did not asnwer the phone  -cardiology eval  -c/w metoprolol, verapamil  -rate controlled      *PVD s/p left AKA  -fall px  -c/w ASA      *HTN:  -c/w metoprolol, verapamil      *Dementia  -supportive care  -aspiration/fall px      *Depression  -mood appears stable      *DVT px:  -Heparin sq

## 2019-02-18 NOTE — H&P ADULT - NSHPPHYSICALEXAM_GEN_ALL_CORE
PHYSICAL EXAM:    Constitutional: NAD, awake and alert, well-developed  HEENT: PERR, EOMI, Normal Hearing, MMM  Neck: Soft and supple, No LAD, No JVD  Respiratory: Breath sounds are clear bilaterally, No wheezing, rales or rhonchi  Cardiovascular: S1 and S2, irregularly irregular rate and rhythm, no Murmurs, gallops or rubs  Gastrointestinal: Bowel Sounds present, soft, nontender, nondistended, no guarding, no rebound  Extremities: No peripheral edema  Vascular: 2+ peripheral pulses  Neurological: A/O x 0, non-verbal. left facial droop, left upper ext strength 4+/5, moving LLE (s/p AKA)  Musculoskeletal: 5/5 strength right upper and lower extremities. s/p left AKA. 4+/5 strength LUE  Skin: No rashes. stage 4 sacral decubitus

## 2019-02-18 NOTE — H&P ADULT - PMH
Above knee amputation of left lower extremity    Atrial fibrillation    Dementia    Depression    Diabetes mellitus  type II  Essential hypertension    Hyperlipidemia    PVD (peripheral vascular disease)

## 2019-02-18 NOTE — H&P ADULT - HISTORY OF PRESENT ILLNESS
85F with hx of DM Type II on Insulin, PVD s/p Left  AKA, dementia (non-verbal at baseline), Afib (not on AC), Major depression, HTN sent from [facility] for unresponsiveness and left sided weakness with left facial droop. Stroke code called, but patient noted to be febrile, tPA not given, d/w Daughter per Neurology note. Patient unable to provide any hx secondary to dementia and non-verbal status. Alert in ED, appears comfortable.

## 2019-02-18 NOTE — ED ADULT NURSE REASSESSMENT NOTE - NS ED NURSE REASSESS COMMENT FT1
Received patient from previous nurse. Pt is resting comfortably in her bed at this time. Pt does not present with any signs of pain or discomfort. Safety measures in place, bed in lowest position and call bell within reach. Hourly rounding will be done on my time. Will continue to monitor.

## 2019-02-18 NOTE — ED PROVIDER NOTE - CLINICAL SUMMARY MEDICAL DECISION MAKING FREE TEXT BOX
Pt w MAS, possible stroke, reported left sided facial droop and not acting as normal. CT negative foor bleed, UA positive, fever 101, Pt cultured, put on Ceftriaxone, CXR pending, fluids ordered, Neuro says not a TPA candidate, Admitted to dr Don Asif

## 2019-02-18 NOTE — ED PROVIDER NOTE - NEUROLOGICAL, MLM
Alert and non-responsive.  Motor deficit noted in the left upper extremity. Alert and non-responsive.  Motor deficit noted in the left upper extremity.  Left sided facial droop.

## 2019-02-18 NOTE — ED ADULT NURSE NOTE - NSIMPLEMENTINTERV_GEN_ALL_ED
Implemented All Fall with Harm Risk Interventions:  [place] to call system. Call bell, personal items and telephone within reach. Instruct patient to call for assistance. Room bathroom lighting operational. Non-slip footwear when patient is off stretcher. Physically safe environment: no spills, clutter or unnecessary equipment. Stretcher in lowest position, wheels locked, appropriate side rails in place. Provide visual cue, wrist band, yellow gown, etc. Monitor gait and stability. Monitor for mental status changes and reorient to person, place, and time. Review medications for side effects contributing to fall risk. Reinforce activity limits and safety measures with patient and family. Provide visual clues: red socks.

## 2019-02-18 NOTE — ED PROVIDER NOTE - MUSCULOSKELETAL, MLM
Spine appears normal, range of motion is not limited, no muscle or joint tenderness.. +AKA on left LE.

## 2019-02-18 NOTE — H&P ADULT - NSHPLABSRESULTS_GEN_ALL_CORE
T(C): 38.4 (19 @ 16:41), Max: 38.8 (19 @ 09:50)  HR: 85 (19 @ 17:48) (81 - 86)  BP: 160/48 (19 @ 17:48) (122/57 - 160/48)  RR: 19 (19 @ 17:48) (14 - 21)  SpO2: 100% (19 @ 17:48) (95% - 100%)  Wt(kg): --                              11.7   15.23 )-----------( 309      ( 2019 10:19 )             37.9     2019 10:19    143    |  109    |  19     ----------------------------<  214    4.3     |  28     |  0.99     Ca    8.5        2019 10:19    TPro  7.4    /  Alb  1.8    /  TBili  0.4    /  DBili  x      /  AST  29     /  ALT  21     /  AlkPhos  125    2019 10:19    PT/INR - ( 2019 10:19 )   PT: 15.2 sec;   INR: 1.36 ratio         PTT - ( 2019 10:19 )  PTT:26.4 sec  CAPILLARY BLOOD GLUCOSE        LIVER FUNCTIONS - ( 2019 10:19 )  Alb: 1.8 g/dL / Pro: 7.4 gm/dL / ALK PHOS: 125 U/L / ALT: 21 U/L / AST: 29 U/L / GGT: x           Urinalysis Basic - ( 2019 12:30 )    Color: Yellow / Appearance: Slightly Turbid / S.020 / pH: x  Gluc: x / Ketone: Trace  / Bili: Negative / Urobili: Negative mg/dL   Blood: x / Protein: 100 mg/dL / Nitrite: Negative   Leuk Esterase: Small / RBC: 3-5 /HPF / WBC 6-10   Sq Epi: x / Non Sq Epi: Few / Bacteria: Many      EKG: AFIb @92bpm

## 2019-02-18 NOTE — ED ADULT NURSE REASSESSMENT NOTE - NS ED NURSE REASSESS COMMENT FT1
Patient confused, nonverbal as baselined. Left facial droop noted, pt unable to follow commands. Febrile, tylenol suppository administered as prescribed. All other VSS. No s/s of distress present. NS bolus infusing. Unstageable pressure ulcer to coccyx, dressing changed; Hospitalist notified for wound care consult. Hygiene care performed, pt repositioned to left side with pillows. Hospitalist consult pending. Safety & comfort measures in place, isolation precautions maintained. Will continue to monitor.

## 2019-02-19 DIAGNOSIS — E78.5 HYPERLIPIDEMIA, UNSPECIFIED: ICD-10-CM

## 2019-02-19 DIAGNOSIS — R41.82 ALTERED MENTAL STATUS, UNSPECIFIED: ICD-10-CM

## 2019-02-19 DIAGNOSIS — I48.91 UNSPECIFIED ATRIAL FIBRILLATION: ICD-10-CM

## 2019-02-19 DIAGNOSIS — I10 ESSENTIAL (PRIMARY) HYPERTENSION: ICD-10-CM

## 2019-02-19 PROBLEM — Z89.512 ACQUIRED ABSENCE OF LEFT LEG BELOW KNEE: Chronic | Status: INACTIVE | Noted: 2017-06-17 | Resolved: 2019-02-18

## 2019-02-19 LAB
CULTURE RESULTS: SIGNIFICANT CHANGE UP
SPECIMEN SOURCE: SIGNIFICANT CHANGE UP

## 2019-02-19 PROCEDURE — 93306 TTE W/DOPPLER COMPLETE: CPT | Mod: 26

## 2019-02-19 PROCEDURE — 99223 1ST HOSP IP/OBS HIGH 75: CPT

## 2019-02-19 PROCEDURE — 99233 SBSQ HOSP IP/OBS HIGH 50: CPT

## 2019-02-19 RX ORDER — METOPROLOL TARTRATE 50 MG
12.5 TABLET ORAL
Qty: 0 | Refills: 0 | COMMUNITY

## 2019-02-19 RX ORDER — INSULIN GLARGINE 100 [IU]/ML
35 INJECTION, SOLUTION SUBCUTANEOUS AT BEDTIME
Qty: 0 | Refills: 0 | Status: DISCONTINUED | OUTPATIENT
Start: 2019-02-19 | End: 2019-02-20

## 2019-02-19 RX ORDER — VERAPAMIL HCL 240 MG
1 CAPSULE, EXTENDED RELEASE PELLETS 24 HR ORAL
Qty: 0 | Refills: 0 | COMMUNITY

## 2019-02-19 RX ORDER — DOCUSATE SODIUM 100 MG
2 CAPSULE ORAL
Qty: 0 | Refills: 0 | COMMUNITY

## 2019-02-19 RX ORDER — LANOLIN ALCOHOL/MO/W.PET/CERES
1 CREAM (GRAM) TOPICAL
Qty: 0 | Refills: 0 | COMMUNITY

## 2019-02-19 RX ORDER — MIRTAZAPINE 45 MG/1
1 TABLET, ORALLY DISINTEGRATING ORAL
Qty: 0 | Refills: 0 | COMMUNITY

## 2019-02-19 RX ORDER — INSULIN ASPART 100 [IU]/ML
0 INJECTION, SOLUTION SUBCUTANEOUS
Qty: 0 | Refills: 0 | COMMUNITY

## 2019-02-19 RX ORDER — ASPIRIN/CALCIUM CARB/MAGNESIUM 324 MG
1 TABLET ORAL
Qty: 0 | Refills: 0 | COMMUNITY

## 2019-02-19 RX ORDER — VANCOMYCIN HCL 1 G
750 VIAL (EA) INTRAVENOUS EVERY 12 HOURS
Qty: 0 | Refills: 0 | Status: DISCONTINUED | OUTPATIENT
Start: 2019-02-19 | End: 2019-03-02

## 2019-02-19 RX ORDER — DEXTROSE 50 % IN WATER 50 %
50 SYRINGE (ML) INTRAVENOUS ONCE
Qty: 0 | Refills: 0 | Status: COMPLETED | OUTPATIENT
Start: 2019-02-19 | End: 2019-02-19

## 2019-02-19 RX ADMIN — HEPARIN SODIUM 5000 UNIT(S): 5000 INJECTION INTRAVENOUS; SUBCUTANEOUS at 16:19

## 2019-02-19 RX ADMIN — MIRTAZAPINE 15 MILLIGRAM(S): 45 TABLET, ORALLY DISINTEGRATING ORAL at 21:14

## 2019-02-19 RX ADMIN — Medication 80 MILLIGRAM(S): at 06:52

## 2019-02-19 RX ADMIN — MEMANTINE HYDROCHLORIDE 10 MILLIGRAM(S): 10 TABLET ORAL at 18:34

## 2019-02-19 RX ADMIN — Medication 500 MILLIGRAM(S): at 12:24

## 2019-02-19 RX ADMIN — DESMOPRESSIN ACETATE 1 SPRAY(S): 0.1 TABLET ORAL at 16:19

## 2019-02-19 RX ADMIN — PIPERACILLIN AND TAZOBACTAM 25 GRAM(S): 4; .5 INJECTION, POWDER, LYOPHILIZED, FOR SOLUTION INTRAVENOUS at 16:18

## 2019-02-19 RX ADMIN — HEPARIN SODIUM 5000 UNIT(S): 5000 INJECTION INTRAVENOUS; SUBCUTANEOUS at 06:52

## 2019-02-19 RX ADMIN — Medication 50 MILLILITER(S): at 17:31

## 2019-02-19 RX ADMIN — Medication 3 MILLIGRAM(S): at 21:14

## 2019-02-19 RX ADMIN — HEPARIN SODIUM 5000 UNIT(S): 5000 INJECTION INTRAVENOUS; SUBCUTANEOUS at 21:14

## 2019-02-19 RX ADMIN — Medication 81 MILLIGRAM(S): at 12:24

## 2019-02-19 RX ADMIN — MEMANTINE HYDROCHLORIDE 10 MILLIGRAM(S): 10 TABLET ORAL at 06:52

## 2019-02-19 RX ADMIN — Medication 1 TABLET(S): at 18:35

## 2019-02-19 RX ADMIN — Medication 100 MILLIGRAM(S): at 06:52

## 2019-02-19 RX ADMIN — PIPERACILLIN AND TAZOBACTAM 25 GRAM(S): 4; .5 INJECTION, POWDER, LYOPHILIZED, FOR SOLUTION INTRAVENOUS at 06:51

## 2019-02-19 RX ADMIN — Medication 12.5 MILLIGRAM(S): at 06:52

## 2019-02-19 RX ADMIN — Medication 250 MILLIGRAM(S): at 21:14

## 2019-02-19 RX ADMIN — Medication 1 TABLET(S): at 06:52

## 2019-02-19 RX ADMIN — Medication 12.5 MILLIGRAM(S): at 18:35

## 2019-02-19 RX ADMIN — ATORVASTATIN CALCIUM 40 MILLIGRAM(S): 80 TABLET, FILM COATED ORAL at 21:13

## 2019-02-19 RX ADMIN — PIPERACILLIN AND TAZOBACTAM 25 GRAM(S): 4; .5 INJECTION, POWDER, LYOPHILIZED, FOR SOLUTION INTRAVENOUS at 21:35

## 2019-02-19 NOTE — ADVANCED PRACTICE NURSE CONSULT - RECOMMEDATIONS
1) Continue to turn and position every 2 hours  2) Continue to elevate heels off mattress  3) Synergy air elite low air loss mattress ordered for pressure redistribution and moisture management 2/19/2019  4) Change dressing to sacrum every other day with Medihoney guaze and foam dressing  5) Albumin- 1.8 on 2/18/2019. Patient being followed by dietician for nutritional assessment and assistance.

## 2019-02-19 NOTE — CHART NOTE - NSCHARTNOTEFT_GEN_A_CORE
Upon Nutritional Assessment by the Registered Dietitian your patient was determined to meet criteria / has evidence of the following diagnosis/diagnoses:          [ ]  Mild Protein Calorie Malnutrition        [ ]  Moderate Protein Calorie Malnutrition        [x] Severe Protein Calorie Malnutrition        [ ] Unspecified Protein Calorie Malnutrition        [ ] Underweight / BMI <19        [ ] Morbid Obesity / BMI > 40      Findings as based on:  •  Comprehensive nutrition assessment and consultation  •  Calorie counts (nutrient intake analysis)  •  Food acceptance and intake status from observations by staff  •  Follow up  •  Patient education  •  Intervention secondary to interdisciplinary rounds  •   concerns     Pt meets criteria for severe protein-calorie malnutrition in context of chronic disease.    Nutrition focused physical exam reveals   moderate muscle wasting (clavicles, shoulders, scapula, interosseus, temples, thighs, calf),   moderate/severe fat wasting (triceps.)    PO intake < 75% nutritional needs > one month    Treatment:    The following diet has been recommended:  Suggest change diet to pureed Consistent Carbohydrate  to increase intake,   add Glucerna 8 oz tid, add Gelatein BID.    Add vit C 500 mg, bid.    Add Zn sulfate 220 mg x 10 days.    Weekly weights.    Record PO intake in EMR after each meal (nursing).    Monitor labs, meds and wt.  PROVIDER Section:     By signing this assessment you are acknowledging and agree with the diagnosis/diagnoses assigned by the Registered Dietitian    Comments:

## 2019-02-19 NOTE — PHYSICAL THERAPY INITIAL EVALUATION ADULT - GENERAL OBSERVATIONS, REHAB EVAL
Pt found sidelying on left side with her eyes closed. Pt minimally verbal and not answering therapist's questions. Pt having difficulty following commands. PAINAD-0/10.

## 2019-02-19 NOTE — DIETITIAN INITIAL EVALUATION ADULT. - PERTINENT MEDS FT
MEDICATIONS  (STANDING):  acetaminophen  Suppository .. 650 milliGRAM(s) Rectal once  ascorbic acid 500 milliGRAM(s) Oral daily  aspirin  chewable 81 milliGRAM(s) Oral daily  atorvastatin 40 milliGRAM(s) Oral at bedtime  calcium carbonate 1250 mG  + Vitamin D (OsCal 500 + D) 1 Tablet(s) Oral two times a day  desmopressin 0.01% Nasal 1 Spray(s) Nasal daily  dextrose 5%. 1000 milliLiter(s) (50 mL/Hr) IV Continuous <Continuous>  dextrose 50% Injectable 12.5 Gram(s) IV Push once  dextrose 50% Injectable 25 Gram(s) IV Push once  dextrose 50% Injectable 25 Gram(s) IV Push once  docusate sodium 100 milliGRAM(s) Oral two times a day  heparin  Injectable 5000 Unit(s) SubCutaneous every 8 hours  insulin glargine Injectable (LANTUS) 45 Unit(s) SubCutaneous at bedtime  insulin lispro (HumaLOG) corrective regimen sliding scale   SubCutaneous three times a day before meals  insulin lispro (HumaLOG) corrective regimen sliding scale   SubCutaneous at bedtime  melatonin 3 milliGRAM(s) Oral at bedtime  memantine 10 milliGRAM(s) Oral two times a day  metoprolol tartrate 12.5 milliGRAM(s) Oral two times a day  mirtazapine 15 milliGRAM(s) Oral at bedtime  piperacillin/tazobactam IVPB. 3.375 Gram(s) IV Intermittent every 8 hours  sodium chloride 0.9%. 1000 milliLiter(s) (75 mL/Hr) IV Continuous <Continuous>  verapamil 80 milliGRAM(s) Oral daily  zinc sulfate 220 milliGRAM(s) Oral daily    MEDICATIONS  (PRN):  acetaminophen   Tablet .. 650 milliGRAM(s) Oral every 6 hours PRN Temp greater or equal to 38C (100.4F), Mild Pain (1 - 3)  dextrose 40% Gel 15 Gram(s) Oral once PRN Blood Glucose LESS THAN 70 milliGRAM(s)/deciliter  glucagon  Injectable 1 milliGRAM(s) IntraMuscular once PRN Glucose LESS THAN 70 milligrams/deciliter

## 2019-02-19 NOTE — CONSULT NOTE ADULT - PROBLEM SELECTOR RECOMMENDATION 2
controlled ventricular rate , patient was not on anticoagulation ,( unclear history )  would continue verapamil , BB , ecotrin

## 2019-02-19 NOTE — PROGRESS NOTE ADULT - SUBJECTIVE AND OBJECTIVE BOX
HPI:  Patient is a  82 y/o F with Pmhx of DM Type II on Insulin, PVD s/p Left  BKA, dementia, Hypernatremia, Afib on ASA 81mg, Major depression, HTN who presented to the ED with new onset L facial droop, LUE weakness that started around 8am. Patient arrival HH ED at 0946. Spoke with patient Daughter Deepti as patient unable to provide history. Patient aide noticed at 8am patient with left sided facial dropping, LUE weakness which is new for patient. At baseline, patient wheelchair bound at nursing home facility with advanced dementia, limited communication. Patient daughter states patient has had generalized weakness that she has noticed over the past few weeks. Febrile in ED Tm 101.9.  NIHSS:18  2/19/19 : Pt lethargic, unable to give any information. bilat weakness left UE > Rt. Can not assess   due to Ms.   MEDICATIONS  (STANDING):  acetaminophen  Suppository .. 650 milliGRAM(s) Rectal once  ascorbic acid 500 milliGRAM(s) Oral daily  aspirin  chewable 81 milliGRAM(s) Oral daily  atorvastatin 40 milliGRAM(s) Oral at bedtime  calcium carbonate 1250 mG  + Vitamin D (OsCal 500 + D) 1 Tablet(s) Oral two times a day  desmopressin 0.01% Nasal 1 Spray(s) Nasal daily  dextrose 5%. 1000 milliLiter(s) (50 mL/Hr) IV Continuous <Continuous>  dextrose 50% Injectable 12.5 Gram(s) IV Push once  dextrose 50% Injectable 25 Gram(s) IV Push once  dextrose 50% Injectable 25 Gram(s) IV Push once  docusate sodium 100 milliGRAM(s) Oral two times a day  heparin  Injectable 5000 Unit(s) SubCutaneous every 8 hours  insulin glargine Injectable (LANTUS) 45 Unit(s) SubCutaneous at bedtime  insulin lispro (HumaLOG) corrective regimen sliding scale   SubCutaneous three times a day before meals  insulin lispro (HumaLOG) corrective regimen sliding scale   SubCutaneous at bedtime  melatonin 3 milliGRAM(s) Oral at bedtime  memantine 10 milliGRAM(s) Oral two times a day  metoprolol tartrate 12.5 milliGRAM(s) Oral two times a day  mirtazapine 15 milliGRAM(s) Oral at bedtime  piperacillin/tazobactam IVPB. 3.375 Gram(s) IV Intermittent every 8 hours  sodium chloride 0.9%. 1000 milliLiter(s) (75 mL/Hr) IV Continuous <Continuous>  verapamil 80 milliGRAM(s) Oral daily  zinc sulfate 220 milliGRAM(s) Oral daily      Vital Signs Last 24 Hrs  T(C): 37 (19 Feb 2019 06:48), Max: 38.7 (18 Feb 2019 14:11)  T(F): 98.6 (19 Feb 2019 06:48), Max: 101.7 (18 Feb 2019 14:11)  HR: 84 (19 Feb 2019 06:48) (70 - 86)  BP: 152/97 (19 Feb 2019 06:48) (128/55 - 160/48)  BP(mean): --  RR: 17 (19 Feb 2019 06:48) (14 - 21)  SpO2: 97% (19 Feb 2019 06:48) (96% - 100%)    Neurological exam:  HF: Lethargic,  drowsy, unable to follow simple commands  CN: ILENE, EOMI,  Palate moves equally, SCM equal bilaterally  Motor: RUE lifts antigravity spontaneously, LUE 2/5 increased tone  L BKA, RLE 2/5  Sens: w/d to noxious on R side, LUE  Reflexes: upgoing toes R LE  Coord:  unable to assess  Gait/Balance: Cannot test    NIHSS: 18                        11.7   15.23 )-----------( 309      ( 18 Feb 2019 10:19 )             37.9     02-18    145  |  115<H>  |  14  ----------------------------<  204<H>  3.8   |  25  |  0.68    Ca    7.8<L>      18 Feb 2019 18:32    TPro  7.4  /  Alb  1.8<L>  /  TBili  0.4  /  DBili  x   /  AST  29  /  ALT  21  /  AlkPhos  125<H>  02-18    Radiology report:  CT Brain Stroke Protocol (02.18.19 @ 09:59)  IMPRESSION:   No acute intracranial hemorrhage, mass effect, or midline shift.

## 2019-02-19 NOTE — CONSULT NOTE ADULT - SUBJECTIVE AND OBJECTIVE BOX
CHIEF COMPLAINT:    HPI:  85F with hx of DM Type II on Insulin, PVD s/p Left  AKA, dementia (non-verbal at baseline), Afib (not on AC), Major depression, HTN sent from [facility] for unresponsiveness and left sided weakness with left facial droop. Stroke code called, but patient noted to be febrile, tPA not given, d/w Daughter per Neurology note. Patient unable to provide any hx secondary to dementia and non-verbal status. Alert in ED, appears comfortable. 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 650 milliGRAM(s) Rectal once  ascorbic acid 500 milliGRAM(s) Oral daily  aspirin  chewable 81 milliGRAM(s) Oral daily  atorvastatin 40 milliGRAM(s) Oral at bedtime  calcium carbonate 1250 mG  + Vitamin D (OsCal 500 + D) 1 Tablet(s) Oral two times a day  desmopressin 0.01% Nasal 1 Spray(s) Nasal daily  dextrose 5%. 1000 milliLiter(s) (50 mL/Hr) IV Continuous <Continuous>  dextrose 50% Injectable 12.5 Gram(s) IV Push once  dextrose 50% Injectable 25 Gram(s) IV Push once  dextrose 50% Injectable 25 Gram(s) IV Push once  docusate sodium 100 milliGRAM(s) Oral two times a day  heparin  Injectable 5000 Unit(s) SubCutaneous every 8 hours  insulin glargine Injectable (LANTUS) 45 Unit(s) SubCutaneous at bedtime  insulin lispro (HumaLOG) corrective regimen sliding scale   SubCutaneous three times a day before meals  insulin lispro (HumaLOG) corrective regimen sliding scale   SubCutaneous at bedtime  melatonin 3 milliGRAM(s) Oral at bedtime  memantine 10 milliGRAM(s) Oral two times a day  metoprolol tartrate 12.5 milliGRAM(s) Oral two times a day  mirtazapine 15 milliGRAM(s) Oral at bedtime  piperacillin/tazobactam IVPB. 3.375 Gram(s) IV Intermittent every 8 hours  sodium chloride 0.9%. 1000 milliLiter(s) (75 mL/Hr) IV Continuous <Continuous>  vancomycin  IVPB 750 milliGRAM(s) IV Intermittent every 12 hours  verapamil 80 milliGRAM(s) Oral daily  zinc sulfate 220 milliGRAM(s) Oral daily    MEDICATIONS  (PRN):  acetaminophen   Tablet .. 650 milliGRAM(s) Oral every 6 hours PRN Temp greater or equal to 38C (100.4F), Mild Pain (1 - 3)  dextrose 40% Gel 15 Gram(s) Oral once PRN Blood Glucose LESS THAN 70 milliGRAM(s)/deciliter  glucagon  Injectable 1 milliGRAM(s) IntraMuscular once PRN Glucose LESS THAN 70 milligrams/deciliter      REVIEW OF SYSTEMS:  non communicative can not be obtained       Vital Signs Last 24 Hrs  T(C): 37 (19 Feb 2019 11:06), Max: 38.4 (18 Feb 2019 16:41)  T(F): 98.6 (19 Feb 2019 11:06), Max: 101.2 (18 Feb 2019 16:41)  HR: 73 (19 Feb 2019 11:06) (70 - 85)  BP: 122/58 (19 Feb 2019 11:06) (122/58 - 160/48)  BP(mean): --  RR: 17 (19 Feb 2019 11:06) (17 - 19)  SpO2: 98% (19 Feb 2019 11:06) (96% - 100%)    I&O's Summary      PHYSICAL EXAM:    Constitutional: NAD, comfortable   HEENT: PERR, EOMI,  No oral cyananosis.  Neck:  supple,  No JVD  Respiratory: Breath sounds equal  CVS   S1s2 IR IR   Gastrointestinal: Bowel Sounds present, soft, nontender.   Extremities: No peripheral edema. No clubbing or cyanosis.  Vascular: left AKA pulse right decreased   Neurological: arousable , non communicative   Musculoskeletal: no calf tenderness.  Skin: No rashes.      LABS: All Labs Reviewed:                        11.7   15.23 )-----------( 309      ( 18 Feb 2019 10:19 )             37.9     18 Feb 2019 18:32    145    |  115    |  14     ----------------------------<  204    3.8     |  25     |  0.68   18 Feb 2019 10:19    143    |  109    |  19     ----------------------------<  214    4.3     |  28     |  0.99     Ca    7.8        18 Feb 2019 18:32  Ca    8.5        18 Feb 2019 10:19    TPro  7.4    /  Alb  1.8    /  TBili  0.4    /  DBili  x      /  AST  29     /  ALT  21     /  AlkPhos  125    18 Feb 2019 10:19    PT/INR - ( 18 Feb 2019 10:19 )   PT: 15.2 sec;   INR: 1.36 ratio         PTT - ( 18 Feb 2019 10:19 )  PTT:26.4 sec      Blood Culture: Organism --  Gram Stain Blood -- Gram Stain --  Specimen Source .Urine Catheterized  Culture-Blood --            RADIOLOGY/EKG:    < from: 12 Lead ECG (02.18.19 @ 10:18) >  Atrial fibrillation  Prolonged QT  Abnormal ECG  Confirmed by EMILIO DELACRUZ MD ([id number]) on 2/18/2019 4:52:20 PM    < end of copied text >  < from: CT Brain Stroke Protocol (02.18.19 @ 09:59) >  No acute intracranial hemorrhage, mass effect, or midline shift.     These results were communicated to Dr. Ngo by me over telephone at   10:07 AM on 2/18/2019.    < end of copied text >

## 2019-02-19 NOTE — PHYSICAL THERAPY INITIAL EVALUATION ADULT - LEVEL OF INDEPENDENCE: SUPINE/SIT, REHAB EVAL
Did not assess OOB at this time secondary to pt not following commands and not opening her eyes. Would recommend OOB via mary lift for pt and staff safety at this time.

## 2019-02-19 NOTE — PROGRESS NOTE ADULT - SUBJECTIVE AND OBJECTIVE BOX
HOSPITALIST ATTENDING PROGRESS NOTE    Chart and meds reviewed.  Patient seen and examined.    HPI: 85F with hx of DM Type II on Insulin, PVD s/p Left  AKA, dementia (non-verbal at baseline), Afib (not on AC), Major depression, HTN sent from [facility] for unresponsiveness and left sided weakness with left facial droop. Stroke code called, but patient noted to be febrile, tPA not given, d/w Daughter per Neurology note. Patient unable to provide any hx secondary to dementia and non-verbal status. Alert in ED, appears comfortable.    19 pt seen and examined, noted to have hypoglycemia. patient is eating, family at bedside, discussed the case in detail. Awaiting MRI, cardio/neuro/ID eval appreciated.    All 10 systems reviewed and found to be negative with the exception of what has been described above.    MEDICATIONS  (STANDING):  acetaminophen  Suppository .. 650 milliGRAM(s) Rectal once  ascorbic acid 500 milliGRAM(s) Oral daily  aspirin  chewable 81 milliGRAM(s) Oral daily  atorvastatin 40 milliGRAM(s) Oral at bedtime  calcium carbonate 1250 mG  + Vitamin D (OsCal 500 + D) 1 Tablet(s) Oral two times a day  desmopressin 0.01% Nasal 1 Spray(s) Nasal daily  dextrose 5%. 1000 milliLiter(s) (50 mL/Hr) IV Continuous <Continuous>  dextrose 50% Injectable 12.5 Gram(s) IV Push once  dextrose 50% Injectable 25 Gram(s) IV Push once  dextrose 50% Injectable 25 Gram(s) IV Push once  docusate sodium 100 milliGRAM(s) Oral two times a day  heparin  Injectable 5000 Unit(s) SubCutaneous every 8 hours  insulin glargine Injectable (LANTUS) 45 Unit(s) SubCutaneous at bedtime  insulin lispro (HumaLOG) corrective regimen sliding scale   SubCutaneous three times a day before meals  insulin lispro (HumaLOG) corrective regimen sliding scale   SubCutaneous at bedtime  melatonin 3 milliGRAM(s) Oral at bedtime  memantine 10 milliGRAM(s) Oral two times a day  metoprolol tartrate 12.5 milliGRAM(s) Oral two times a day  mirtazapine 15 milliGRAM(s) Oral at bedtime  piperacillin/tazobactam IVPB. 3.375 Gram(s) IV Intermittent every 8 hours  sodium chloride 0.9%. 1000 milliLiter(s) (75 mL/Hr) IV Continuous <Continuous>  vancomycin  IVPB 750 milliGRAM(s) IV Intermittent every 12 hours  verapamil 80 milliGRAM(s) Oral daily  zinc sulfate 220 milliGRAM(s) Oral daily    MEDICATIONS  (PRN):  acetaminophen   Tablet .. 650 milliGRAM(s) Oral every 6 hours PRN Temp greater or equal to 38C (100.4F), Mild Pain (1 - 3)  dextrose 40% Gel 15 Gram(s) Oral once PRN Blood Glucose LESS THAN 70 milliGRAM(s)/deciliter  glucagon  Injectable 1 milliGRAM(s) IntraMuscular once PRN Glucose LESS THAN 70 milligrams/deciliter      VITALS:  T(F): 98.6 (19 @ 11:06), Max: 100.2 (19 @ 21:02)  HR: 73 (19 @ 11:06) (70 - 85)  BP: 122/58 (19 @ 11:06) (122/58 - 160/48)  RR: 17 (19 @ 11:06) (17 - 19)  SpO2: 98% (19 @ 11:06) (96% - 100%)  Wt(kg): --    I&O's Summary      CAPILLARY BLOOD GLUCOSE      POCT Blood Glucose.: 97 mg/dL (2019 17:41)  POCT Blood Glucose.: 51 mg/dL (2019 17:19)  POCT Blood Glucose.: 77 mg/dL (2019 12:05)  POCT Blood Glucose.: 97 mg/dL (2019 08:21)  POCT Blood Glucose.: 220 mg/dL (2019 23:29)      PHYSICAL EXAM:    HEENT:  pupils equal and reactive, EOMI, no oropharyngeal lesions, erythema, exudates, oral thrush  NECK:   supple, no carotid bruits, no palpable lymph nodes, no thyromegaly  CV:  +S1, +S2, regular, no murmurs or rubs  RESP:   lungs clear to auscultation bilaterally, no wheezing, rales, rhonchi, good air entry bilaterally  BREAST:  not examined  GI:  abdomen soft, non-tender, non-distended, normal BS, no bruits, no abdominal masses, no palpable masses  RECTAL:  not examined  :  not examined  MSK:   normal muscle tone, no atrophy, no rigidity, no contractions  EXT:  no clubbing, no cyanosis, no edema, no calf pain, swelling or erythema  VASCULAR:  pulses equal and symmetric in the upper and lower extremities  NEURO:  AAOX3, no focal neurological deficits, follows all commands, able to move extremities spontaneously  SKIN:  no ulcers, lesions or rashes    LABS:                            11.7   15.23 )-----------( 309      ( 2019 10:19 )             37.9         145  |  115<H>  |  14  ----------------------------<  204<H>  3.8   |  25  |  0.68    Ca    7.8<L>      2019 18:32    TPro  7.4  /  Alb  1.8<L>  /  TBili  0.4  /  DBili  x   /  AST  29  /  ALT  21  /  AlkPhos  125<H>          LIVER FUNCTIONS - ( 2019 10:19 )  Alb: 1.8 g/dL / Pro: 7.4 gm/dL / ALK PHOS: 125 U/L / ALT: 21 U/L / AST: 29 U/L / GGT: x           PT/INR - ( 2019 10:19 )   PT: 15.2 sec;   INR: 1.36 ratio         PTT - ( 2019 10:19 )  PTT:26.4 sec  Urinalysis Basic - ( 2019 12:30 )    Color: Yellow / Appearance: Slightly Turbid / S.020 / pH: x  Gluc: x / Ketone: Trace  / Bili: Negative / Urobili: Negative mg/dL   Blood: x / Protein: 100 mg/dL / Nitrite: Negative   Leuk Esterase: Small / RBC: 3-5 /HPF / WBC 6-10   Sq Epi: x / Non Sq Epi: Few / Bacteria: Many                                    CULTURES:

## 2019-02-19 NOTE — DIETITIAN INITIAL EVALUATION ADULT. - PERTINENT LABORATORY DATA
02-18 Na145 mmol/L Glu 204 mg/dL<H> K+ 3.8 mmol/L Cr  0.68 mg/dL BUN 14 mg/dL Phos n/a   Alb n/a   PAB n/a

## 2019-02-19 NOTE — PHYSICAL THERAPY INITIAL EVALUATION ADULT - MANUAL MUSCLE TESTING RESULTS, REHAB EVAL
Unable to assess strength via MMT secondary to pt not following commands. Pt also having difficulty keeping her eyes open.

## 2019-02-19 NOTE — CONSULT NOTE ADULT - SUBJECTIVE AND OBJECTIVE BOX
Patient is a 85y old  Female who presents with a chief complaint of CVA (2019 10:17)    HPI:  85F with hx of DM Type II on Insulin, PVD s/p Left  AKA, dementia (non-verbal at baseline), Afib (not on AC), Major depression, HTN admitted on  from snf for evaluation of left facial droop. Upon admission patient was febrile; History per medical record as patient unable to provide history; of note she has foul smelling sacral ulcer being cared for by snf.        PMH: as above  PSH: as above  Meds: per reconciliation sheet, noted below  MEDICATIONS  (STANDING):  acetaminophen  Suppository .. 650 milliGRAM(s) Rectal once  ascorbic acid 500 milliGRAM(s) Oral daily  aspirin  chewable 81 milliGRAM(s) Oral daily  atorvastatin 40 milliGRAM(s) Oral at bedtime  calcium carbonate 1250 mG  + Vitamin D (OsCal 500 + D) 1 Tablet(s) Oral two times a day  desmopressin 0.01% Nasal 1 Spray(s) Nasal daily  dextrose 5%. 1000 milliLiter(s) (50 mL/Hr) IV Continuous <Continuous>  dextrose 50% Injectable 12.5 Gram(s) IV Push once  dextrose 50% Injectable 25 Gram(s) IV Push once  dextrose 50% Injectable 25 Gram(s) IV Push once  docusate sodium 100 milliGRAM(s) Oral two times a day  heparin  Injectable 5000 Unit(s) SubCutaneous every 8 hours  insulin glargine Injectable (LANTUS) 45 Unit(s) SubCutaneous at bedtime  insulin lispro (HumaLOG) corrective regimen sliding scale   SubCutaneous three times a day before meals  insulin lispro (HumaLOG) corrective regimen sliding scale   SubCutaneous at bedtime  melatonin 3 milliGRAM(s) Oral at bedtime  memantine 10 milliGRAM(s) Oral two times a day  metoprolol tartrate 12.5 milliGRAM(s) Oral two times a day  mirtazapine 15 milliGRAM(s) Oral at bedtime  piperacillin/tazobactam IVPB. 3.375 Gram(s) IV Intermittent every 8 hours  sodium chloride 0.9%. 1000 milliLiter(s) (75 mL/Hr) IV Continuous <Continuous>  vancomycin  IVPB 750 milliGRAM(s) IV Intermittent every 12 hours  verapamil 80 milliGRAM(s) Oral daily  zinc sulfate 220 milliGRAM(s) Oral daily    MEDICATIONS  (PRN):  acetaminophen   Tablet .. 650 milliGRAM(s) Oral every 6 hours PRN Temp greater or equal to 38C (100.4F), Mild Pain (1 - 3)  dextrose 40% Gel 15 Gram(s) Oral once PRN Blood Glucose LESS THAN 70 milliGRAM(s)/deciliter  glucagon  Injectable 1 milliGRAM(s) IntraMuscular once PRN Glucose LESS THAN 70 milligrams/deciliter    Allergies    iodine (Other (Unknown))  sulfa drugs (Other (Unknown))    Intolerances      Social: no smoking, no alcohol, no illegal drugs; no recent travel, no exposure to TB  FAMILY HISTORY:  No pertinent family history in first degree relatives     no history of premature cardiovascular disease in first degree relatives  ROS: unable to obtain secondary to patient medical condition     Vital Signs Last 24 Hrs  T(C): 37 (2019 11:06), Max: 38.7 (2019 14:11)  T(F): 98.6 (2019 11:06), Max: 101.7 (2019 14:11)  HR: 73 (2019 11:06) (70 - 86)  BP: 122/58 (2019 11:06) (122/58 - 160/48)  BP(mean): --  RR: 17 (2019 11:06) (14 - 19)  SpO2: 98% (2019 11:06) (96% - 100%)  Daily     Daily Weight in k (2019 12:12)    PE:    Constitutional: frail looking  HEENT: NC/AT, EOMI, PERRLA, conjunctivae clear; ears and nose atraumatic; pharynx clear  Neck: supple; thyroid not palpable  Back: no tenderness  Respiratory: respiratory effort normal  Cardiovascular: S1S2 regular, no murmurs  Abdomen: soft, not tender, not distended, positive BS; no liver or spleen organomegaly  Genitourinary: no suprapubic tenderness  Musculoskeletal: no muscle tenderness  Neurological/ Psychiatric: nonverbal  Skin: no rashes; 3 cm ulcer over sacrum visible bone, foul smelling brown drainage    Labs: all available labs reviewed                        11.7   15.23 )-----------( 309      ( 2019 10:19 )             37.9         145  |  115<H>  |  14  ----------------------------<  204<H>  3.8   |  25  |  0.68    Ca    7.8<L>      2019 18:32    TPro  7.4  /  Alb  1.8<L>  /  TBili  0.4  /  DBili  x   /  AST  29  /  ALT  21  /  AlkPhos  125<H>  18     LIVER FUNCTIONS - ( 2019 10:19 )  Alb: 1.8 g/dL / Pro: 7.4 gm/dL / ALK PHOS: 125 U/L / ALT: 21 U/L / AST: 29 U/L / GGT: x           Urinalysis Basic - ( 2019 12:30 )    Color: Yellow / Appearance: Slightly Turbid / S.020 / pH: x  Gluc: x / Ketone: Trace  / Bili: Negative / Urobili: Negative mg/dL   Blood: x / Protein: 100 mg/dL / Nitrite: Negative   Leuk Esterase: Small / RBC: 3-5 /HPF / WBC 6-10   Sq Epi: x / Non Sq Epi: Few / Bacteria: Many          Radiology: all available radiological tests reviewed    Advanced directives addressed: full resuscitation

## 2019-02-19 NOTE — PHYSICAL THERAPY INITIAL EVALUATION ADULT - PERTINENT HX OF CURRENT PROBLEM, REHAB EVAL
Pt admitted to  secondary to unresponsiveness, left sided weakness, and left facial droop. CT head: neg.

## 2019-02-19 NOTE — CONSULT NOTE ADULT - ASSESSMENT
85F with hx of DM Type II on Insulin, PVD s/p Left  AKA, dementia (non-verbal at baseline), Afib (not on AC), Major depression, HTN admitted on 2/18 from snf for evaluation of left facial droop. Upon admission patient was febrile; History per medical record as patient unable to provide history; of note she has foul smelling sacral ulcer being cared for by snf.  1. 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 other issues: DM Type II on Insulin, PVD s/p Left  AKA, dementia (non-verbal at baseline), Afib (not on AC), Major depression, HTN   - per medicine

## 2019-02-19 NOTE — ADVANCED PRACTICE NURSE CONSULT - ASSESSMENT
This is an 85 year old female that was admitted to the hospital on 2/18/2019 for acute cystitis  PMH-  DM Type II on Insulin, PVD s/p Left  AKA, dementia (non-verbal at baseline), Afib (not on AC), Major depression, HTN sent from [facility] for unresponsiveness and left sided weakness with left facial droop.     Requested by MD to assess patient's sacral pressure injury present on admission. Patient presents on an Total [employer] Sport mattress on her left side with both heels elevated off mattress. RN reports that patients daughter reported that patients wound was recently debrided at [place], 3 days ago. Upon removal of dressing, foul odor noted. Yellow/green drainage noted. Wound irrigated with normal saline. Wound measures [id number] with undermining at 6 o'clock measuring 5 cm. Positive probe to the bone.  Would classify as a stage 4 pressure injury. Skin prep applied to periwound for protection. Medihoney applied to wound bed to assist with autolytic debridement, odor control and wound healing. 2 pieces of sterile gauze gently packed into wound bed to fill in dead space. Foam dressing applied as cover dressing. Patient remains on her left side with heels elevated off mattress after assessment and treatment completed

## 2019-02-19 NOTE — DIETITIAN INITIAL EVALUATION ADULT. - OTHER INFO
Consult for Pressure ulcer stage 2 or greater. Pt is 85F with hx of DM Type II on Insulin, PVD s/p Left  AKA, dementia (non-verbal at baseline), Afib (not on AC), Major depression, HTN sent from [facility] for unresponsiveness and left sided weakness with left facial droop. Stroke code called, but patient noted to be febrile, tPA not given, d/w Daughter per Neurology note. Patient unable to provide any hx secondary to dementia and non-verbal status. Alert in ED, appears comfortable. Per EMR, dx of dementia, depression, HTN, Afib. Consult for Pressure ulcer stage 2 or greater. Pt is 85F with hx of DM Type II on Insulin, PVD s/p Left  AKA, dementia (non-verbal at baseline), Afib (not on AC), Major depression, HTN sent from [facility] for unresponsiveness and left sided weakness with left facial droop. Stroke code called, but patient noted to be febrile, tPA not given, d/w Daughter per Neurology note. Patient unable to provide any hx secondary to dementia and non-verbal status. Alert in ED, appears comfortable. Per EMR, dx of dementia, depression, HTN, Afib. No edema noted.  Alex 8.  Pressure ulcers : unstageable, sacrum, stage 1 left, suspected DTI right buttock and right heel.  Pt has AKA, right. Pt on basal/bolus protocol for DM,. A1c is 8.7.  Pt requires feeding at .  Pt meets criteria for severe protein-calorie malnutrition in context of chronic disease.  nutrition focused physical exam reveals moderate muscle wasting (clavicles, shoulders, scapula, interosseus, temples, thighs, calf), moderate/severe fat wasting (triceps.)  Pt requires teeth to eat solid food, but  does not have teeth at , is on pureed diet.  Suggest change diet to pureed Consistent Carbohydrate  to maximize intake, add Glucerna 8 oz tid, add Gelatein BID.  Add vit C 500 mg, bid.  Add Zn sulfate 220 mg x 10 days.  Weekly weights.  Record PO intake in EMR after each meal (nursing).  Monitor labs, meds and wt. Consult for Pressure ulcer stage 2 or greater. Pt is 85F with hx of DM Type II on Insulin, PVD s/p Left  AKA, dementia (non-verbal at baseline), Afib (not on AC), Major depression, HTN sent from [facility] for unresponsiveness and left sided weakness with left facial droop. Stroke code called, but patient noted to be febrile, tPA not given, d/w Daughter per Neurology note. Patient unable to provide any hx secondary to dementia and non-verbal status. Alert in ED, appears comfortable. Per EMR, dx of dementia, depression, HTN, Afib. No edema noted.  Alex 8.  Pressure ulcers : unstageable, sacrum, stage 1 left, suspected DTI right buttock and right heel.  Pt has AKA, right. Pt on basal/bolus protocol for DM,. A1c is 8.7.  Pt requires feeding at .  Pt meets criteria for severe protein-calorie malnutrition in context of chronic disease.  nutrition focused physical exam reveals moderate muscle wasting (clavicles, shoulders, scapula, interosseus, temples, thighs, calf), moderate/severe fat wasting (triceps.)  Po intake < 75% nutritional needs > one month.  Pt requires teeth to eat solid food, but  does not have teeth at , is on pureed diet.  Suggest change diet to pureed Consistent Carbohydrate  to maximize intake, add Glucerna 8 oz tid, add Gelatein BID.  Add vit C 500 mg, bid.  Add Zn sulfate 220 mg x 10 days.  Weekly weights.  Record PO intake in EMR after each meal (nursing).  Monitor labs, meds and wt.

## 2019-02-19 NOTE — PHYSICAL THERAPY INITIAL EVALUATION ADULT - PASSIVE RANGE OF MOTION EXAMINATION, REHAB EVAL
Right shoulder flex ~ 90 degrees, elbow flex/ext ~30-~WFL, wrist/hand limited ROM. Left shoulder flex ~ 60 degrees, left elbow flex/ext ~40-~WFL, and wrist/hand ROM limited. Right hip flex ~ 60 degrees, knee flex/ext ~40-~WFL, and DF ~-20 degrees. Left stump not fully assessed. Pt not following commands to assess ROM.

## 2019-02-19 NOTE — DIETITIAN INITIAL EVALUATION ADULT. - DIET TYPE
consistent carbohydrate (evening snack)/dysphagia 1, pureed, thin liquids/DASH/TLC (sodium and cholesterol restricted diet)

## 2019-02-19 NOTE — CONSULT NOTE ADULT - PROBLEM SELECTOR RECOMMENDATION 9
acute CVA ? vs possibly multifactorial , underlying advanced  vascular dementia , with uro sepsis , dehydration ,    continue antibiotics,, follow up echo

## 2019-02-19 NOTE — DIETITIAN INITIAL EVALUATION ADULT. - ENERGY NEEDS
Ht. 65     "        Wt.    62    kg               BMI  23                IBW   51kg (adjusted for AKA)    kg                 Pt is at  122  %  IBW

## 2019-02-19 NOTE — CONSULT NOTE ADULT - SUBJECTIVE AND OBJECTIVE BOX
Cardiology Consultation    HPI: 85F with hx of DM Type II on Insulin, PVD s/p Left  AKA, dementia (non-verbal at baseline), Afib (not on AC), Major depression, HTN sent from [facility] for unresponsiveness and left sided weakness with left facial droop. Stroke code called, but patient noted to be febrile, tPA not given, d/w Daughter per Neurology note. Patient unable to provide any hx secondary to dementia and non-verbal status. Alert in ED, appears comfortable. (2019 17:41)    . Confused, unable to answer questions due to dementia.   PAF on tele.     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 650 milliGRAM(s) Rectal once  ascorbic acid 500 milliGRAM(s) Oral daily  aspirin  chewable 81 milliGRAM(s) Oral daily  atorvastatin 40 milliGRAM(s) Oral at bedtime  calcium carbonate 1250 mG  + Vitamin D (OsCal 500 + D) 1 Tablet(s) Oral two times a day  desmopressin 0.01% Nasal 1 Spray(s) Nasal daily  dextrose 5%. 1000 milliLiter(s) (50 mL/Hr) IV Continuous <Continuous>  dextrose 50% Injectable 12.5 Gram(s) IV Push once  dextrose 50% Injectable 25 Gram(s) IV Push once  dextrose 50% Injectable 25 Gram(s) IV Push once  docusate sodium 100 milliGRAM(s) Oral two times a day  heparin  Injectable 5000 Unit(s) SubCutaneous every 8 hours  insulin glargine Injectable (LANTUS) 45 Unit(s) SubCutaneous at bedtime  insulin lispro (HumaLOG) corrective regimen sliding scale   SubCutaneous three times a day before meals  insulin lispro (HumaLOG) corrective regimen sliding scale   SubCutaneous at bedtime  melatonin 3 milliGRAM(s) Oral at bedtime  memantine 10 milliGRAM(s) Oral two times a day  metoprolol tartrate 12.5 milliGRAM(s) Oral two times a day  mirtazapine 15 milliGRAM(s) Oral at bedtime  piperacillin/tazobactam IVPB. 3.375 Gram(s) IV Intermittent every 8 hours  sodium chloride 0.9%. 1000 milliLiter(s) (75 mL/Hr) IV Continuous <Continuous>  verapamil 80 milliGRAM(s) Oral daily  zinc sulfate 220 milliGRAM(s) Oral daily    MEDICATIONS  (PRN):  acetaminophen   Tablet .. 650 milliGRAM(s) Oral every 6 hours PRN Temp greater or equal to 38C (100.4F), Mild Pain (1 - 3)  dextrose 40% Gel 15 Gram(s) Oral once PRN Blood Glucose LESS THAN 70 milliGRAM(s)/deciliter  glucagon  Injectable 1 milliGRAM(s) IntraMuscular once PRN Glucose LESS THAN 70 milligrams/deciliter    Vital Signs Last 24 Hrs  T(C): 37 (2019 06:48), Max: 38.8 (2019 09:50)  T(F): 98.6 (2019 06:48), Max: 101.9 (2019 09:50)  HR: 84 (2019 06:48) (70 - 86)  BP: 152/97 (2019 06:48) (122/57 - 160/48)  BP(mean): --  RR: 17 (2019 06:48) (14 - 21)  SpO2: 97% (2019 06:48) (95% - 100%)    REVIEW OF SYSTEMS:    CONSTITUTIONAL:  As per HPI.  HEENT:  Eyes:  No diplopia or blurred vision. ENT:  No earache, sore throat or runny nose.  CARDIOVASCULAR:  No pressure, squeezing, strangling, tightness, heaviness or aching about the chest, neck, axilla or epigastrium.  RESPIRATORY:  No cough, shortness of breath, PND or orthopnea.  GASTROINTESTINAL:  No nausea, vomiting or diarrhea.  GENITOURINARY:  No dysuria, frequency or urgency.  MUSCULOSKELETAL:  As per HPI.  SKIN:  No change in skin, hair or nails.  NEUROLOGIC:  No paresthesias, fasciculations, seizures or weakness.    PHYSICAL EXAMINATION:    GENERAL APPEARANCE: Confused, mildly lethargic.   HEENT:  Pupils are normal and react normally. No icterus. Mucous membranes well colored.  NECK:  Supple. No lymphadenopathy. Jugular venous pressure not elevated. Carotids equal.   HEART:   The cardiac impulse has a normal quality. There are no murmurs, rubs or gallops noted  CHEST:  Chest is clear to auscultation. Normal respiratory effort.  ABDOMEN:  Soft and nontender.   EXTREMITIES:  There is no edema.     I&O's Summary      LABS:                        11.7   15.23 )-----------( 309      ( 2019 10:19 )             37.9         145  |  115<H>  |  14  ----------------------------<  204<H>  3.8   |  25  |  0.68    Ca    7.8<L>      2019 18:32    TPro  7.4  /  Alb  1.8<L>  /  TBili  0.4  /  DBili  x   /  AST  29  /  ALT  21  /  AlkPhos  125<H>      LIVER FUNCTIONS - ( 2019 10:19 )  Alb: 1.8 g/dL / Pro: 7.4 gm/dL / ALK PHOS: 125 U/L / ALT: 21 U/L / AST: 29 U/L / GGT: x           PT/INR - ( 2019 10:19 )   PT: 15.2 sec;   INR: 1.36 ratio       PTT - ( 2019 10:19 )  PTT:26.4 sec    Urinalysis Basic - ( 2019 12:30 )    Color: Yellow / Appearance: Slightly Turbid / S.020 / pH: x  Gluc: x / Ketone: Trace  / Bili: Negative / Urobili: Negative mg/dL   Blood: x / Protein: 100 mg/dL / Nitrite: Negative   Leuk Esterase: Small / RBC: 3-5 /HPF / WBC 6-10   Sq Epi: x / Non Sq Epi: Few / Bacteria: Many    EKG: < from: 12 Lead ECG (19 @ 10:18) >   Atrial fibrillation  Prolonged QT  Abnormal ECG    TELEMETRY: SR    CARDIAC TESTS: pending    RADIOLOGY & ADDITIONAL STUDIES: < from: CT Brain Stroke Protocol (19 @ 09:59) >  No acute intracranial hemorrhage, mass effect, or midline shift.     ASSESSMENT & PLAN:

## 2019-02-19 NOTE — CONSULT NOTE ADULT - ASSESSMENT
A/P: 85F with hx of DM Type II on Insulin, PVD s/p Left  AKA, dementia (non-verbal at baseline), Afib (not on AC), Major depression, HTN sent from [facility] for unresponsiveness and left sided weakness with left facial droop.    1. CVA. In setting of afib and not on LTA. Awaiting MRI/MRA, 2Decho.   Neuro following. If ok by neuro, would start anticoagulation to prevent CVA recurrence.   CHADS Vasc score is 8 and very high.   Cont tele for now. Would hold on CHARLEY in this elderly pt with known afib.     2. HTN. Allow for some permissive HTN. Cont current regimen for now.     3. PVD. Cont asa/statin. Conservative medical mgmt.     4. Dyslipidemia. Cont statin.     5. DVT proph, replete lytes as needed.

## 2019-02-20 LAB
ALBUMIN SERPL ELPH-MCNC: 1.7 G/DL — LOW (ref 3.3–5)
ALP SERPL-CCNC: 113 U/L — SIGNIFICANT CHANGE UP (ref 40–120)
ALT FLD-CCNC: 20 U/L — SIGNIFICANT CHANGE UP (ref 12–78)
ANION GAP SERPL CALC-SCNC: 9 MMOL/L — SIGNIFICANT CHANGE UP (ref 5–17)
AST SERPL-CCNC: 25 U/L — SIGNIFICANT CHANGE UP (ref 15–37)
BILIRUB SERPL-MCNC: 0.4 MG/DL — SIGNIFICANT CHANGE UP (ref 0.2–1.2)
BUN SERPL-MCNC: 10 MG/DL — SIGNIFICANT CHANGE UP (ref 7–23)
CALCIUM SERPL-MCNC: 8.5 MG/DL — SIGNIFICANT CHANGE UP (ref 8.5–10.1)
CHLORIDE SERPL-SCNC: 112 MMOL/L — HIGH (ref 96–108)
CO2 SERPL-SCNC: 22 MMOL/L — SIGNIFICANT CHANGE UP (ref 22–31)
CREAT SERPL-MCNC: 0.89 MG/DL — SIGNIFICANT CHANGE UP (ref 0.5–1.3)
GLUCOSE SERPL-MCNC: 150 MG/DL — HIGH (ref 70–99)
HCT VFR BLD CALC: 39.1 % — SIGNIFICANT CHANGE UP (ref 34.5–45)
HGB BLD-MCNC: 12 G/DL — SIGNIFICANT CHANGE UP (ref 11.5–15.5)
MCHC RBC-ENTMCNC: 28 PG — SIGNIFICANT CHANGE UP (ref 27–34)
MCHC RBC-ENTMCNC: 30.7 GM/DL — LOW (ref 32–36)
MCV RBC AUTO: 91.1 FL — SIGNIFICANT CHANGE UP (ref 80–100)
NRBC # BLD: 0 /100 WBCS — SIGNIFICANT CHANGE UP (ref 0–0)
PLATELET # BLD AUTO: 267 K/UL — SIGNIFICANT CHANGE UP (ref 150–400)
POTASSIUM SERPL-MCNC: 3.8 MMOL/L — SIGNIFICANT CHANGE UP (ref 3.5–5.3)
POTASSIUM SERPL-SCNC: 3.8 MMOL/L — SIGNIFICANT CHANGE UP (ref 3.5–5.3)
PROT SERPL-MCNC: 7.1 GM/DL — SIGNIFICANT CHANGE UP (ref 6–8.3)
RBC # BLD: 4.29 M/UL — SIGNIFICANT CHANGE UP (ref 3.8–5.2)
RBC # FLD: 14 % — SIGNIFICANT CHANGE UP (ref 10.3–14.5)
SODIUM SERPL-SCNC: 143 MMOL/L — SIGNIFICANT CHANGE UP (ref 135–145)
WBC # BLD: 12.18 K/UL — HIGH (ref 3.8–10.5)
WBC # FLD AUTO: 12.18 K/UL — HIGH (ref 3.8–10.5)

## 2019-02-20 PROCEDURE — 99233 SBSQ HOSP IP/OBS HIGH 50: CPT

## 2019-02-20 PROCEDURE — 72197 MRI PELVIS W/O & W/DYE: CPT | Mod: 26

## 2019-02-20 PROCEDURE — 93010 ELECTROCARDIOGRAM REPORT: CPT

## 2019-02-20 RX ORDER — DEXTROSE 50 % IN WATER 50 %
25 SYRINGE (ML) INTRAVENOUS ONCE
Qty: 0 | Refills: 0 | Status: DISCONTINUED | OUTPATIENT
Start: 2019-02-20 | End: 2019-02-20

## 2019-02-20 RX ORDER — INSULIN GLARGINE 100 [IU]/ML
25 INJECTION, SOLUTION SUBCUTANEOUS AT BEDTIME
Qty: 0 | Refills: 0 | Status: DISCONTINUED | OUTPATIENT
Start: 2019-02-20 | End: 2019-03-01

## 2019-02-20 RX ORDER — DEXTROSE 50 % IN WATER 50 %
50 SYRINGE (ML) INTRAVENOUS ONCE
Qty: 0 | Refills: 0 | Status: COMPLETED | OUTPATIENT
Start: 2019-02-20 | End: 2019-02-20

## 2019-02-20 RX ORDER — SODIUM CHLORIDE 9 MG/ML
1000 INJECTION, SOLUTION INTRAVENOUS
Qty: 0 | Refills: 0 | Status: DISCONTINUED | OUTPATIENT
Start: 2019-02-20 | End: 2019-02-21

## 2019-02-20 RX ORDER — DEXTROSE 50 % IN WATER 50 %
25 SYRINGE (ML) INTRAVENOUS ONCE
Qty: 0 | Refills: 0 | Status: COMPLETED | OUTPATIENT
Start: 2019-02-20 | End: 2019-02-20

## 2019-02-20 RX ADMIN — MEMANTINE HYDROCHLORIDE 10 MILLIGRAM(S): 10 TABLET ORAL at 17:42

## 2019-02-20 RX ADMIN — Medication 25 MILLILITER(S): at 01:52

## 2019-02-20 RX ADMIN — HEPARIN SODIUM 5000 UNIT(S): 5000 INJECTION INTRAVENOUS; SUBCUTANEOUS at 23:50

## 2019-02-20 RX ADMIN — Medication 80 MILLIGRAM(S): at 06:15

## 2019-02-20 RX ADMIN — DESMOPRESSIN ACETATE 1 SPRAY(S): 0.1 TABLET ORAL at 13:15

## 2019-02-20 RX ADMIN — Medication 1 TABLET(S): at 06:15

## 2019-02-20 RX ADMIN — SODIUM CHLORIDE 70 MILLILITER(S): 9 INJECTION, SOLUTION INTRAVENOUS at 12:17

## 2019-02-20 RX ADMIN — PIPERACILLIN AND TAZOBACTAM 25 GRAM(S): 4; .5 INJECTION, POWDER, LYOPHILIZED, FOR SOLUTION INTRAVENOUS at 06:15

## 2019-02-20 RX ADMIN — MEMANTINE HYDROCHLORIDE 10 MILLIGRAM(S): 10 TABLET ORAL at 06:15

## 2019-02-20 RX ADMIN — Medication 50 MILLILITER(S): at 02:51

## 2019-02-20 RX ADMIN — Medication 81 MILLIGRAM(S): at 13:10

## 2019-02-20 RX ADMIN — Medication 500 MILLIGRAM(S): at 13:10

## 2019-02-20 RX ADMIN — Medication 1 TABLET(S): at 17:42

## 2019-02-20 RX ADMIN — PIPERACILLIN AND TAZOBACTAM 25 GRAM(S): 4; .5 INJECTION, POWDER, LYOPHILIZED, FOR SOLUTION INTRAVENOUS at 18:42

## 2019-02-20 RX ADMIN — PIPERACILLIN AND TAZOBACTAM 25 GRAM(S): 4; .5 INJECTION, POWDER, LYOPHILIZED, FOR SOLUTION INTRAVENOUS at 23:40

## 2019-02-20 RX ADMIN — Medication 250 MILLIGRAM(S): at 06:40

## 2019-02-20 RX ADMIN — HEPARIN SODIUM 5000 UNIT(S): 5000 INJECTION INTRAVENOUS; SUBCUTANEOUS at 06:15

## 2019-02-20 RX ADMIN — Medication 12.5 MILLIGRAM(S): at 06:15

## 2019-02-20 RX ADMIN — Medication 250 MILLIGRAM(S): at 17:32

## 2019-02-20 RX ADMIN — HEPARIN SODIUM 5000 UNIT(S): 5000 INJECTION INTRAVENOUS; SUBCUTANEOUS at 13:10

## 2019-02-20 RX ADMIN — Medication 12.5 MILLIGRAM(S): at 17:41

## 2019-02-20 NOTE — PROGRESS NOTE ADULT - SUBJECTIVE AND OBJECTIVE BOX
Cardiology Progress Note    HPI: 85F with hx of DM Type II on Insulin, PVD s/p Left  AKA, dementia (non-verbal at baseline), Afib (not on AC), Major depression, HTN sent from [facility] for unresponsiveness and left sided weakness with left facial droop. Stroke code called, but patient noted to be febrile, tPA not given, d/w Daughter per Neurology note. Patient unable to provide any hx secondary to dementia and non-verbal status. Alert in ED, appears comfortable. (2019 17:41)    . Confused, unable to answer questions due to dementia.   PAF on tele.     . Confused, lethargic. Case d/w family at bedside. SR on tele.     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 650 milliGRAM(s) Rectal once  ascorbic acid 500 milliGRAM(s) Oral daily  aspirin  chewable 81 milliGRAM(s) Oral daily  atorvastatin 40 milliGRAM(s) Oral at bedtime  calcium carbonate 1250 mG  + Vitamin D (OsCal 500 + D) 1 Tablet(s) Oral two times a day  desmopressin 0.01% Nasal 1 Spray(s) Nasal daily  dextrose 5%. 1000 milliLiter(s) (50 mL/Hr) IV Continuous <Continuous>  dextrose 50% Injectable 12.5 Gram(s) IV Push once  dextrose 50% Injectable 25 Gram(s) IV Push once  dextrose 50% Injectable 25 Gram(s) IV Push once  docusate sodium 100 milliGRAM(s) Oral two times a day  heparin  Injectable 5000 Unit(s) SubCutaneous every 8 hours  insulin glargine Injectable (LANTUS) 45 Unit(s) SubCutaneous at bedtime  insulin lispro (HumaLOG) corrective regimen sliding scale   SubCutaneous three times a day before meals  insulin lispro (HumaLOG) corrective regimen sliding scale   SubCutaneous at bedtime  melatonin 3 milliGRAM(s) Oral at bedtime  memantine 10 milliGRAM(s) Oral two times a day  metoprolol tartrate 12.5 milliGRAM(s) Oral two times a day  mirtazapine 15 milliGRAM(s) Oral at bedtime  piperacillin/tazobactam IVPB. 3.375 Gram(s) IV Intermittent every 8 hours  sodium chloride 0.9%. 1000 milliLiter(s) (75 mL/Hr) IV Continuous <Continuous>  verapamil 80 milliGRAM(s) Oral daily  zinc sulfate 220 milliGRAM(s) Oral daily    MEDICATIONS  (PRN):  acetaminophen   Tablet .. 650 milliGRAM(s) Oral every 6 hours PRN Temp greater or equal to 38C (100.4F), Mild Pain (1 - 3)  dextrose 40% Gel 15 Gram(s) Oral once PRN Blood Glucose LESS THAN 70 milliGRAM(s)/deciliter  glucagon  Injectable 1 milliGRAM(s) IntraMuscular once PRN Glucose LESS THAN 70 milligrams/deciliter    Vital Signs Last 24 Hrs  T(C): 37 (2019 06:48), Max: 38.8 (2019 09:50)  T(F): 98.6 (2019 06:48), Max: 101.9 (2019 09:50)  HR: 84 (2019 06:48) (70 - 86)  BP: 152/97 (2019 06:48) (122/57 - 160/48)  BP(mean): --  RR: 17 (2019 06:48) (14 - 21)  SpO2: 97% (2019 06:48) (95% - 100%)    REVIEW OF SYSTEMS:    CONSTITUTIONAL:  As per HPI.  HEENT:  Eyes:  No diplopia or blurred vision. ENT:  No earache, sore throat or runny nose.  CARDIOVASCULAR:  No pressure, squeezing, strangling, tightness, heaviness or aching about the chest, neck, axilla or epigastrium.  RESPIRATORY:  No cough, shortness of breath, PND or orthopnea.  GASTROINTESTINAL:  No nausea, vomiting or diarrhea.  GENITOURINARY:  No dysuria, frequency or urgency.  MUSCULOSKELETAL:  As per HPI.  SKIN:  No change in skin, hair or nails.  NEUROLOGIC:  No paresthesias, fasciculations, seizures or weakness.    PHYSICAL EXAMINATION:    GENERAL APPEARANCE: Confused, mildly lethargic.   HEENT:  Pupils are normal and react normally. No icterus. Mucous membranes well colored.  NECK:  Supple. No lymphadenopathy. Jugular venous pressure not elevated. Carotids equal.   HEART:   The cardiac impulse has a normal quality. There are no murmurs, rubs or gallops noted  CHEST:  Chest is clear to auscultation. Normal respiratory effort.  ABDOMEN:  Soft and nontender.   EXTREMITIES:  There is no edema.     I&O's Summary      LABS:                        11.7   15.23 )-----------( 309      ( 2019 10:19 )             37.9         145  |  115<H>  |  14  ----------------------------<  204<H>  3.8   |  25  |  0.68    Ca    7.8<L>      2019 18:32    TPro  7.4  /  Alb  1.8<L>  /  TBili  0.4  /  DBili  x   /  AST  29  /  ALT  21  /  AlkPhos  125<H>      LIVER FUNCTIONS - ( 2019 10:19 )  Alb: 1.8 g/dL / Pro: 7.4 gm/dL / ALK PHOS: 125 U/L / ALT: 21 U/L / AST: 29 U/L / GGT: x           PT/INR - ( 2019 10:19 )   PT: 15.2 sec;   INR: 1.36 ratio       PTT - ( 2019 10:19 )  PTT:26.4 sec    Urinalysis Basic - ( 2019 12:30 )    Color: Yellow / Appearance: Slightly Turbid / S.020 / pH: x  Gluc: x / Ketone: Trace  / Bili: Negative / Urobili: Negative mg/dL   Blood: x / Protein: 100 mg/dL / Nitrite: Negative   Leuk Esterase: Small / RBC: 3-5 /HPF / WBC 6-10   Sq Epi: x / Non Sq Epi: Few / Bacteria: Many    EKG: < from: 12 Lead ECG (19 @ 10:18) >   Atrial fibrillation  Prolonged QT  Abnormal ECG    TELEMETRY: SR    CARDIAC TESTS: < from: Transthoracic Echocardiogram (19 @ 09:46) >  Technically limited study.   The LV endocardium is poorly seen. Global LV systolic function is normal   The left ventricle is normal in size.   Estimated left ventricular ejection fraction is 50- 55 %.   Normal appearing left atrium.   Normal appearing right atrium.   Normal appearing right ventricle structure and function.   The aortic valve is not well visualized, appears normal. Valve opening   seems to be normal.   Mild mitral annular calcification is present.   Fibrocalcific changes noted to the mitral valve leaflets with preserved   leaflet excursion.   Moderate (2+) mitral regurgitation is present.   Trace tricuspid valve regurgitation is present.   No evidence of pericardial effusion.    RADIOLOGY & ADDITIONAL STUDIES: < from: CT Brain Stroke Protocol (19 @ 09:59) >  No acute intracranial hemorrhage, mass effect, or midline shift.     ASSESSMENT & PLAN:

## 2019-02-20 NOTE — SWALLOW BEDSIDE ASSESSMENT ADULT - ORAL PHASE
immediate bolus transfer for thin liquid via straw and spoon: stagnation of puree and honey with very slow and apparently incomplete transfer:  with appearance of residual in the mouth

## 2019-02-20 NOTE — SWALLOW BEDSIDE ASSESSMENT ADULT - SLP GENERAL OBSERVATIONS
On encounter, pt semi reclining in bed:  eyes closed and only intermittently and briefly arousable: head droop to the left with open mouthed posture and  anterior tongue drift to the left:

## 2019-02-20 NOTE — SWALLOW BEDSIDE ASSESSMENT ADULT - COMMENTS
pt, -American female, age 83, Pmhx of DM Type II on Insulin, PVD s/p Left  BKA, dementia, reportedly non-verbal at baseline: hypernatremia, Afib on ASA 81mg, Major depression, HTN who presented to the ED with new onset L facial droop, LUE weakness that started around 8am. Patient arrival [facility] ED at 0946.  As per H and R, pt unable to provide hx, given by Daughter Deepti  Patient aide noticed at 8am patient with left sided facial droop, LUE weakness which is new for patient. At baseline, patient wheelchair bound at nursing home facility with advanced dementia, limited communication. Patient daughter states patient has had generalized weakness that she has noticed over the past few weeks. Febrile in ED Tm 101.9.

## 2019-02-20 NOTE — SWALLOW BEDSIDE ASSESSMENT ADULT - SWALLOW EVAL: RECOMMENDED DIET
at this time: full liquid with thinning of thicker consistencies: use spoon and straw: SHORTEN THE STRAW.  Tell pt what is presented.  Feed only when pt has oral acceptance resposnes for utensil at  the lip: DOES NOT NEED HER EYES OPEN.

## 2019-02-20 NOTE — SWALLOW BEDSIDE ASSESSMENT ADULT - PHARYNGEAL PHASE
latent onset of pharyngeal swallow, but audible laryngeal lift on straw drinking and on ingestion of nectar and honey thick liquid.  No overt s/s aspiration on bolus conssitencies offered..

## 2019-02-20 NOTE — SWALLOW BEDSIDE ASSESSMENT ADULT - ORAL PREPARATORY PHASE
no anticipatory mouth opening 2/2 to closed eyes but immediate oral response to presence of the spoon at the lip: some initial difficulty coordinating lip closure on spoon and straw, poor lip seal on cup a t this time.  As per family, Pt uses a straw at th [facility].

## 2019-02-20 NOTE — SWALLOW BEDSIDE ASSESSMENT ADULT - ASR SWALLOW DENTITION
some lower teeth in right froantal positon: a;; other teeth missing. pt has dentures. unclear if she wears them.

## 2019-02-20 NOTE — PROGRESS NOTE ADULT - SUBJECTIVE AND OBJECTIVE BOX
HOSPITALIST ATTENDING PROGRESS NOTE    Chart and meds reviewed.  Patient seen and examined.    HPI: 85F with hx of DM Type II on Insulin, PVD s/p Left  AKA, dementia (non-verbal at baseline), Afib (not on AC), Major depression, HTN sent from [facility] for unresponsiveness and left sided weakness with left facial droop. Stroke code called, but patient noted to be febrile, tPA not given, d/w Daughter per Neurology note. Patient unable to provide any hx secondary to dementia and non-verbal status. Alert in ED, appears comfortable.    2/19/19 pt seen and examined, noted to have hypoglycemia. patient is eating, family at bedside, discussed the case in detail. Awaiting MRI, cardio/neuro/ID eval appreciated.    2/20/19 pt seen and examined, speech and swallow eval suggest full liquid diet, discussed case with daughter over the phone dirk and rest of family in room, MRI of head unable to tolerate, MRI of the hip performed. Patient now noted to be in Afib, discussed r/b of anticoagulation with family.       All 10 systems reviewed and found to be negative with the exception of what has been described above.    MEDICATIONS  (STANDING):  acetaminophen  Suppository .. 650 milliGRAM(s) Rectal once  ascorbic acid 500 milliGRAM(s) Oral daily  aspirin  chewable 81 milliGRAM(s) Oral daily  atorvastatin 40 milliGRAM(s) Oral at bedtime  calcium carbonate 1250 mG  + Vitamin D (OsCal 500 + D) 1 Tablet(s) Oral two times a day  desmopressin 0.01% Nasal 1 Spray(s) Nasal daily  dextrose 5% + sodium chloride 0.9%. 1000 milliLiter(s) (70 mL/Hr) IV Continuous <Continuous>  dextrose 5%. 1000 milliLiter(s) (50 mL/Hr) IV Continuous <Continuous>  dextrose 50% Injectable 12.5 Gram(s) IV Push once  dextrose 50% Injectable 25 Gram(s) IV Push once  dextrose 50% Injectable 25 Gram(s) IV Push once  docusate sodium 100 milliGRAM(s) Oral two times a day  heparin  Injectable 5000 Unit(s) SubCutaneous every 8 hours  insulin glargine Injectable (LANTUS) 25 Unit(s) SubCutaneous at bedtime  insulin lispro (HumaLOG) corrective regimen sliding scale   SubCutaneous three times a day before meals  insulin lispro (HumaLOG) corrective regimen sliding scale   SubCutaneous at bedtime  melatonin 3 milliGRAM(s) Oral at bedtime  memantine 10 milliGRAM(s) Oral two times a day  metoprolol tartrate 12.5 milliGRAM(s) Oral two times a day  mirtazapine 15 milliGRAM(s) Oral at bedtime  piperacillin/tazobactam IVPB. 3.375 Gram(s) IV Intermittent every 8 hours  sodium chloride 0.9%. 1000 milliLiter(s) (75 mL/Hr) IV Continuous <Continuous>  vancomycin  IVPB 750 milliGRAM(s) IV Intermittent every 12 hours  verapamil 80 milliGRAM(s) Oral daily  zinc sulfate 220 milliGRAM(s) Oral daily    MEDICATIONS  (PRN):  acetaminophen   Tablet .. 650 milliGRAM(s) Oral every 6 hours PRN Temp greater or equal to 38C (100.4F), Mild Pain (1 - 3)  dextrose 40% Gel 15 Gram(s) Oral once PRN Blood Glucose LESS THAN 70 milliGRAM(s)/deciliter  glucagon  Injectable 1 milliGRAM(s) IntraMuscular once PRN Glucose LESS THAN 70 milligrams/deciliter      VITALS:  T(F): 99.8 (02-20-19 @ 05:36), Max: 99.8 (02-20-19 @ 05:36)  HR: 77 (02-20-19 @ 05:36) (77 - 79)  BP: 141/65 (02-20-19 @ 05:36) (132/77 - 141/65)  RR: 17 (02-20-19 @ 05:36) (17 - 17)  SpO2: 99% (02-20-19 @ 05:36) (99% - 99%)  Wt(kg): --    I&O's Summary      CAPILLARY BLOOD GLUCOSE      POCT Blood Glucose.: 109 mg/dL (20 Feb 2019 12:30)  POCT Blood Glucose.: 109 mg/dL (20 Feb 2019 06:45)  POCT Blood Glucose.: 161 mg/dL (20 Feb 2019 03:30)  POCT Blood Glucose.: 88 mg/dL (20 Feb 2019 02:46)  POCT Blood Glucose.: 85 mg/dL (20 Feb 2019 02:22)  POCT Blood Glucose.: 56 mg/dL (20 Feb 2019 01:46)  POCT Blood Glucose.: 66 mg/dL (19 Feb 2019 21:12)  POCT Blood Glucose.: 141 mg/dL (19 Feb 2019 18:34)  POCT Blood Glucose.: 95 mg/dL (19 Feb 2019 18:05)  POCT Blood Glucose.: 97 mg/dL (19 Feb 2019 17:41)  POCT Blood Glucose.: 51 mg/dL (19 Feb 2019 17:19)      PHYSICAL EXAM:    HEENT:  pupils equal and reactive, EOMI, no oropharyngeal lesions, erythema, exudates, oral thrush  NECK:   supple, no carotid bruits, no palpable lymph nodes, no thyromegaly  CV:  +S1, +S2, regular, no murmurs or rubs  RESP:   lungs clear to auscultation bilaterally, no wheezing, rales, rhonchi, good air entry bilaterally  BREAST:  not examined  GI:  abdomen soft, non-tender, non-distended, normal BS, no bruits, no abdominal masses, no palpable masses  RECTAL:  not examined  :  not examined  MSK:   normal muscle tone, no atrophy, no rigidity, no contractions  EXT:  no clubbing, no cyanosis, no edema, no calf pain, swelling or erythema  VASCULAR:  pulses equal and symmetric in the upper and lower extremities  NEURO:  AAOX3, no focal neurological deficits, follows all commands, able to move extremities spontaneously  SKIN:  no ulcers, lesions or rashes    LABS:                            12.0   12.18 )-----------( 267      ( 20 Feb 2019 05:24 )             39.1     02-20    143  |  112<H>  |  10  ----------------------------<  150<H>  3.8   |  22  |  0.89    Ca    8.5      20 Feb 2019 05:24    TPro  7.1  /  Alb  1.7<L>  /  TBili  0.4  /  DBili  x   /  AST  25  /  ALT  20  /  AlkPhos  113  02-20        LIVER FUNCTIONS - ( 20 Feb 2019 05:24 )  Alb: 1.7 g/dL / Pro: 7.1 gm/dL / ALK PHOS: 113 U/L / ALT: 20 U/L / AST: 25 U/L / GGT: x                                             CULTURES:

## 2019-02-20 NOTE — CHART NOTE - NSCHARTNOTEFT_GEN_A_CORE
Repeat BGM 56. D 50% 25 cc given. Rechecked BGM 85    Plan  -Monitor BGM q15 minutes  -D50% 50 ml x1 STAT    Case was d/w Dr. Castano PGY 3

## 2019-02-20 NOTE — SWALLOW BEDSIDE ASSESSMENT ADULT - NS SPL SWALLOW CLINIC TRIAL FT
pt presents with functional oral-pharyngeal dysphagia for a modified texture diet: RX at this time FULL LIQUID: oral phase is more impaired than the pharyngeal as pt has difficulty managing the thicker bolus orally,  pt will be a challenging feed, because of her dementia, her acute CVA and her position:   tell pt what is presented: Use teaspoon and straw.  Shorten the straw.  pt as upright as possible and in alignment.  feed only when Pt can participate with oral acceptance responses even if her eyes are closed.  o not rush pt, Wait for swallow;  verbally cue as necessary. Aspiration precautions.    Disc with Family and with Nsg.  Service will follow.

## 2019-02-20 NOTE — SWALLOW BEDSIDE ASSESSMENT ADULT - SWALLOW EVAL: RECOMMENDED FEEDING/EATING TECHNIQUES
support head in alignment as necessary/allow for swallow between intakes/position upright (90 degrees)/maintain upright posture during/after eating for 30 mins/crush medication (when feasible)

## 2019-02-20 NOTE — SWALLOW BEDSIDE ASSESSMENT ADULT - SWALLOW EVAL: DIAGNOSIS
oral-pharyngeal dysphagia for modified texture diet, in a setting of CVA with facial droop, superimposed on advanced dementia.

## 2019-02-20 NOTE — PROGRESS NOTE ADULT - SUBJECTIVE AND OBJECTIVE BOX
· Subjective and Objective: 	  HPI:  Patient is a  82 y/o F with Pmhx of DM Type II on Insulin, PVD s/p Left  BKA, dementia, Hypernatremia, Afib on ASA 81mg, Major depression, HTN who presented to the ED with new onset L facial droop, LUE weakness that started around 8am. Patient arrival HH ED at 0946. Spoke with patient Daughter Deepti as patient unable to provide history. Patient aide noticed at 8am patient with left sided facial dropping, LUE weakness which is new for patient. At baseline, patient wheelchair bound at nursing home facility with advanced dementia, limited communication. Patient daughter states patient has had generalized weakness that she has noticed over the past few weeks. Febrile in ED Tm 101.9.  NIHSS:18  2/19/19 : Pt lethargic, unable to give any information. bilat weakness left UE > Rt. Can not assess     2/20/19 : Pt leaning to left side more, non verbal not following commands, bilat weakness left > Rt more pronounced today, drooling from mouth left side.     MEDICATIONS  (STANDING):  acetaminophen  Suppository .. 650 milliGRAM(s) Rectal once  ascorbic acid 500 milliGRAM(s) Oral daily  aspirin  chewable 81 milliGRAM(s) Oral daily  atorvastatin 40 milliGRAM(s) Oral at bedtime  calcium carbonate 1250 mG  + Vitamin D (OsCal 500 + D) 1 Tablet(s) Oral two times a day  desmopressin 0.01% Nasal 1 Spray(s) Nasal daily  dextrose 5%. 1000 milliLiter(s) (50 mL/Hr) IV Continuous <Continuous>  dextrose 50% Injectable 12.5 Gram(s) IV Push once  dextrose 50% Injectable 25 Gram(s) IV Push once  dextrose 50% Injectable 25 Gram(s) IV Push once  docusate sodium 100 milliGRAM(s) Oral two times a day  heparin  Injectable 5000 Unit(s) SubCutaneous every 8 hours  insulin glargine Injectable (LANTUS) 35 Unit(s) SubCutaneous at bedtime  insulin lispro (HumaLOG) corrective regimen sliding scale   SubCutaneous three times a day before meals  insulin lispro (HumaLOG) corrective regimen sliding scale   SubCutaneous at bedtime  melatonin 3 milliGRAM(s) Oral at bedtime  memantine 10 milliGRAM(s) Oral two times a day  metoprolol tartrate 12.5 milliGRAM(s) Oral two times a day  mirtazapine 15 milliGRAM(s) Oral at bedtime  piperacillin/tazobactam IVPB. 3.375 Gram(s) IV Intermittent every 8 hours  sodium chloride 0.9%. 1000 milliLiter(s) (75 mL/Hr) IV Continuous <Continuous>  vancomycin  IVPB 750 milliGRAM(s) IV Intermittent every 12 hours  verapamil 80 milliGRAM(s) Oral daily  zinc sulfate 220 milliGRAM(s) Oral daily      Vital Signs Last 24 Hrs  T(C): 37.7 (20 Feb 2019 05:36), Max: 37.7 (20 Feb 2019 05:36)  T(F): 99.8 (20 Feb 2019 05:36), Max: 99.8 (20 Feb 2019 05:36)  HR: 77 (20 Feb 2019 05:36) (73 - 79)  BP: 141/65 (20 Feb 2019 05:36) (122/58 - 141/65)  BP(mean): --  RR: 17 (20 Feb 2019 05:36) (17 - 17)  SpO2: 99% (20 Feb 2019 05:36) (98% - 99%  )  Neurological exam:  HF: Lethargic,  drowsy, unable to follow simple commands  CN: ILENE, EOMI,  Palate moves equally, SCM equal bilaterally  Motor: RUE lifts antigravity spontaneously, LUE 2/5 increased tone  L BKA, RLE 2/5  Sens: w/d to noxious on R side, LUE  Reflexes: upgoing toes R LE  Coord:  unable to assess  Gait/Balance: Cannot test    NIHSS: 18                        12.0   12.18 )-----------( 267      ( 20 Feb 2019 05:24 )             39.1     02-20    143  |  112<H>  |  10  ----------------------------<  150<H>  3.8   |  22  |  0.89    Ca    8.5      20 Feb 2019 05:24    TPro  7.1  /  Alb  1.7<L>  /  TBili  0.4  /  DBili  x   /  AST  25  /  ALT  20  /  AlkPhos  113  02-20    02-19 [id number] 8.7    Radiology report:  - CT Head  CT Brain Stroke Protocol (02.18.19 @ 09:59)  IMPRESSION:   No acute intracranial hemorrhage, mass effect, or midline shift.

## 2019-02-20 NOTE — PROGRESS NOTE ADULT - SUBJECTIVE AND OBJECTIVE BOX
Patient is a 85y old  Female who presents with a chief complaint of CVA (19 Feb 2019 10:17)      Date of service: 02-20-19 @ 16:31      Patient lying in bed; appears comfortable      ROS unable to obtain secondary to patient medical condition     MEDICATIONS  (STANDING):  acetaminophen  Suppository .. 650 milliGRAM(s) Rectal once  ascorbic acid 500 milliGRAM(s) Oral daily  aspirin  chewable 81 milliGRAM(s) Oral daily  atorvastatin 40 milliGRAM(s) Oral at bedtime  calcium carbonate 1250 mG  + Vitamin D (OsCal 500 + D) 1 Tablet(s) Oral two times a day  desmopressin 0.01% Nasal 1 Spray(s) Nasal daily  dextrose 5% + sodium chloride 0.9%. 1000 milliLiter(s) (70 mL/Hr) IV Continuous <Continuous>  dextrose 5%. 1000 milliLiter(s) (50 mL/Hr) IV Continuous <Continuous>  dextrose 50% Injectable 12.5 Gram(s) IV Push once  dextrose 50% Injectable 25 Gram(s) IV Push once  dextrose 50% Injectable 25 Gram(s) IV Push once  docusate sodium 100 milliGRAM(s) Oral two times a day  heparin  Injectable 5000 Unit(s) SubCutaneous every 8 hours  insulin glargine Injectable (LANTUS) 25 Unit(s) SubCutaneous at bedtime  insulin lispro (HumaLOG) corrective regimen sliding scale   SubCutaneous three times a day before meals  insulin lispro (HumaLOG) corrective regimen sliding scale   SubCutaneous at bedtime  melatonin 3 milliGRAM(s) Oral at bedtime  memantine 10 milliGRAM(s) Oral two times a day  metoprolol tartrate 12.5 milliGRAM(s) Oral two times a day  mirtazapine 15 milliGRAM(s) Oral at bedtime  piperacillin/tazobactam IVPB. 3.375 Gram(s) IV Intermittent every 8 hours  sodium chloride 0.9%. 1000 milliLiter(s) (75 mL/Hr) IV Continuous <Continuous>  vancomycin  IVPB 750 milliGRAM(s) IV Intermittent every 12 hours  verapamil 80 milliGRAM(s) Oral daily  zinc sulfate 220 milliGRAM(s) Oral daily    MEDICATIONS  (PRN):  acetaminophen   Tablet .. 650 milliGRAM(s) Oral every 6 hours PRN Temp greater or equal to 38C (100.4F), Mild Pain (1 - 3)  dextrose 40% Gel 15 Gram(s) Oral once PRN Blood Glucose LESS THAN 70 milliGRAM(s)/deciliter  glucagon  Injectable 1 milliGRAM(s) IntraMuscular once PRN Glucose LESS THAN 70 milligrams/deciliter      Vital Signs Last 24 Hrs  T(C): 37.7 (20 Feb 2019 05:36), Max: 37.7 (20 Feb 2019 05:36)  T(F): 99.8 (20 Feb 2019 05:36), Max: 99.8 (20 Feb 2019 05:36)  HR: 77 (20 Feb 2019 05:36) (77 - 79)  BP: 141/65 (20 Feb 2019 05:36) (132/77 - 141/65)  BP(mean): --  RR: 17 (20 Feb 2019 05:36) (17 - 17)  SpO2: 99% (20 Feb 2019 05:36) (99% - 99%)    Physical Exam:        PE:    Constitutional: frail looking  HEENT: NC/AT, EOMI, PERRLA, conjunctivae clear; ears and nose atraumatic; pharynx clear  Neck: supple; thyroid not palpable  Back: no tenderness  Respiratory: respiratory effort normal  Cardiovascular: S1S2 regular, no murmurs  Abdomen: soft, not tender, not distended, positive BS; no liver or spleen organomegaly  Genitourinary: no suprapubic tenderness  Musculoskeletal: no muscle tenderness; left AKA  Neurological/ Psychiatric: nonverbal  Skin: no rashes; 3 cm ulcer over sacrum visible bone, foul smelling brown drainage    Labs: all available labs reviewed                         Labs:                        12.0   12.18 )-----------( 267      ( 20 Feb 2019 05:24 )             39.1     02-20    143  |  112<H>  |  10  ----------------------------<  150<H>  3.8   |  22  |  0.89    Ca    8.5      20 Feb 2019 05:24    TPro  7.1  /  Alb  1.7<L>  /  TBili  0.4  /  DBili  x   /  AST  25  /  ALT  20  /  AlkPhos  113  02-20           Cultures:       Culture - Urine (collected 02-18-19 @ 12:30)  Source: .Urine Catheterized  Final Report (02-19-19 @ 09:47):    <10,000 CFU/ml Normal Urogenital amos present    Culture - Blood (collected 02-18-19 @ 11:03)  Source: .Blood None  Preliminary Report (02-19-19 @ 18:02):    No growth to date.    Culture - Blood (collected 02-18-19 @ 10:19)  Source: .Blood None  Preliminary Report (02-19-19 @ 18:02):    No growth to date.            Radiology: all available radiological tests reviewed    Advanced directives addressed: full resuscitation

## 2019-02-21 LAB
ADD ON TEST-SPECIMEN IN LAB: SIGNIFICANT CHANGE UP
ALBUMIN SERPL ELPH-MCNC: 1.6 G/DL — LOW (ref 3.3–5)
ALP SERPL-CCNC: 105 U/L — SIGNIFICANT CHANGE UP (ref 40–120)
ALT FLD-CCNC: 18 U/L — SIGNIFICANT CHANGE UP (ref 12–78)
ANION GAP SERPL CALC-SCNC: 8 MMOL/L — SIGNIFICANT CHANGE UP (ref 5–17)
AST SERPL-CCNC: 20 U/L — SIGNIFICANT CHANGE UP (ref 15–37)
BILIRUB SERPL-MCNC: 0.4 MG/DL — SIGNIFICANT CHANGE UP (ref 0.2–1.2)
BUN SERPL-MCNC: 8 MG/DL — SIGNIFICANT CHANGE UP (ref 7–23)
CALCIUM SERPL-MCNC: 8.4 MG/DL — LOW (ref 8.5–10.1)
CHLORIDE SERPL-SCNC: 109 MMOL/L — HIGH (ref 96–108)
CO2 SERPL-SCNC: 25 MMOL/L — SIGNIFICANT CHANGE UP (ref 22–31)
CREAT SERPL-MCNC: 0.85 MG/DL — SIGNIFICANT CHANGE UP (ref 0.5–1.3)
GLUCOSE SERPL-MCNC: 139 MG/DL — HIGH (ref 70–99)
HCT VFR BLD CALC: 37.3 % — SIGNIFICANT CHANGE UP (ref 34.5–45)
HGB BLD-MCNC: 11.4 G/DL — LOW (ref 11.5–15.5)
MCHC RBC-ENTMCNC: 28 PG — SIGNIFICANT CHANGE UP (ref 27–34)
MCHC RBC-ENTMCNC: 30.6 GM/DL — LOW (ref 32–36)
MCV RBC AUTO: 91.6 FL — SIGNIFICANT CHANGE UP (ref 80–100)
NRBC # BLD: 0 /100 WBCS — SIGNIFICANT CHANGE UP (ref 0–0)
PLATELET # BLD AUTO: 311 K/UL — SIGNIFICANT CHANGE UP (ref 150–400)
POTASSIUM SERPL-MCNC: 3.4 MMOL/L — LOW (ref 3.5–5.3)
POTASSIUM SERPL-SCNC: 3.4 MMOL/L — LOW (ref 3.5–5.3)
PROT SERPL-MCNC: 6.9 GM/DL — SIGNIFICANT CHANGE UP (ref 6–8.3)
RBC # BLD: 4.07 M/UL — SIGNIFICANT CHANGE UP (ref 3.8–5.2)
RBC # FLD: 13.8 % — SIGNIFICANT CHANGE UP (ref 10.3–14.5)
SODIUM SERPL-SCNC: 142 MMOL/L — SIGNIFICANT CHANGE UP (ref 135–145)
VANCOMYCIN TROUGH SERPL-MCNC: 17.4 UG/ML — SIGNIFICANT CHANGE UP (ref 10–20)
WBC # BLD: 12.42 K/UL — HIGH (ref 3.8–10.5)
WBC # FLD AUTO: 12.42 K/UL — HIGH (ref 3.8–10.5)

## 2019-02-21 PROCEDURE — 99233 SBSQ HOSP IP/OBS HIGH 50: CPT

## 2019-02-21 PROCEDURE — 70450 CT HEAD/BRAIN W/O DYE: CPT | Mod: 26

## 2019-02-21 RX ORDER — POTASSIUM CHLORIDE 20 MEQ
20 PACKET (EA) ORAL ONCE
Qty: 0 | Refills: 0 | Status: COMPLETED | OUTPATIENT
Start: 2019-02-21 | End: 2019-02-21

## 2019-02-21 RX ADMIN — PIPERACILLIN AND TAZOBACTAM 25 GRAM(S): 4; .5 INJECTION, POWDER, LYOPHILIZED, FOR SOLUTION INTRAVENOUS at 22:40

## 2019-02-21 RX ADMIN — PIPERACILLIN AND TAZOBACTAM 25 GRAM(S): 4; .5 INJECTION, POWDER, LYOPHILIZED, FOR SOLUTION INTRAVENOUS at 07:30

## 2019-02-21 RX ADMIN — PIPERACILLIN AND TAZOBACTAM 25 GRAM(S): 4; .5 INJECTION, POWDER, LYOPHILIZED, FOR SOLUTION INTRAVENOUS at 16:22

## 2019-02-21 RX ADMIN — HEPARIN SODIUM 5000 UNIT(S): 5000 INJECTION INTRAVENOUS; SUBCUTANEOUS at 21:25

## 2019-02-21 RX ADMIN — Medication 250 MILLIGRAM(S): at 06:03

## 2019-02-21 RX ADMIN — Medication 12.5 MILLIGRAM(S): at 06:02

## 2019-02-21 RX ADMIN — INSULIN GLARGINE 25 UNIT(S): 100 INJECTION, SOLUTION SUBCUTANEOUS at 21:25

## 2019-02-21 RX ADMIN — Medication 81 MILLIGRAM(S): at 12:19

## 2019-02-21 RX ADMIN — Medication 3 MILLIGRAM(S): at 21:26

## 2019-02-21 RX ADMIN — Medication 20 MILLIEQUIVALENT(S): at 16:22

## 2019-02-21 RX ADMIN — ATORVASTATIN CALCIUM 40 MILLIGRAM(S): 80 TABLET, FILM COATED ORAL at 21:26

## 2019-02-21 RX ADMIN — SODIUM CHLORIDE 75 MILLILITER(S): 9 INJECTION INTRAMUSCULAR; INTRAVENOUS; SUBCUTANEOUS at 21:26

## 2019-02-21 RX ADMIN — Medication 500 MILLIGRAM(S): at 12:19

## 2019-02-21 RX ADMIN — Medication 80 MILLIGRAM(S): at 06:02

## 2019-02-21 RX ADMIN — MEMANTINE HYDROCHLORIDE 10 MILLIGRAM(S): 10 TABLET ORAL at 17:58

## 2019-02-21 RX ADMIN — DESMOPRESSIN ACETATE 1 SPRAY(S): 0.1 TABLET ORAL at 12:20

## 2019-02-21 RX ADMIN — ZINC SULFATE TAB 220 MG (50 MG ZINC EQUIVALENT) 220 MILLIGRAM(S): 220 (50 ZN) TAB at 12:19

## 2019-02-21 RX ADMIN — Medication 1 TABLET(S): at 17:59

## 2019-02-21 RX ADMIN — Medication 1 TABLET(S): at 06:01

## 2019-02-21 RX ADMIN — Medication 2: at 08:24

## 2019-02-21 RX ADMIN — Medication 2: at 12:19

## 2019-02-21 RX ADMIN — HEPARIN SODIUM 5000 UNIT(S): 5000 INJECTION INTRAVENOUS; SUBCUTANEOUS at 06:02

## 2019-02-21 RX ADMIN — Medication 250 MILLIGRAM(S): at 21:25

## 2019-02-21 RX ADMIN — HEPARIN SODIUM 5000 UNIT(S): 5000 INJECTION INTRAVENOUS; SUBCUTANEOUS at 16:23

## 2019-02-21 RX ADMIN — Medication 12.5 MILLIGRAM(S): at 17:58

## 2019-02-21 RX ADMIN — MEMANTINE HYDROCHLORIDE 10 MILLIGRAM(S): 10 TABLET ORAL at 06:01

## 2019-02-21 RX ADMIN — MIRTAZAPINE 15 MILLIGRAM(S): 45 TABLET, ORALLY DISINTEGRATING ORAL at 21:26

## 2019-02-21 NOTE — PROGRESS NOTE ADULT - SUBJECTIVE AND OBJECTIVE BOX
· Subjective and Objective: 	  HPI:  Patient is a  84 y/o F with Pmhx of DM Type II on Insulin, PVD s/p Left  BKA, dementia, Hypernatremia, Afib on ASA 81mg, Major depression, HTN who presented to the ED with new onset L facial droop, LUE weakness that started around 8am. Patient arrival HH ED at 0946. Spoke with patient Daughter Deepti as patient unable to provide history. Patient aide noticed at 8am patient with left sided facial dropping, LUE weakness which is new for patient. At baseline, patient wheelchair bound at nursing home facility with advanced dementia, limited communication. Patient daughter states patient has had generalized weakness that she has noticed over the past few weeks. Febrile in ED Tm 101.9.  NIHSS:18  2/19/19 : Pt lethargic, unable to give any information. bilat weakness left UE > Rt. Can not assess     2/20/19 : Pt leaning to left side more, non verbal not following commands, bilat weakness left > Rt more pronounced today, drooling from mouth left side.   2/21/19 : Pt morw alert, follows simple commands , still weak on left Ue with contractures, does not move it to commands, does not follow.   MEDICATIONS  (STANDING):  acetaminophen  Suppository .. 650 milliGRAM(s) Rectal once  ascorbic acid 500 milliGRAM(s) Oral daily  aspirin  chewable 81 milliGRAM(s) Oral daily  atorvastatin 40 milliGRAM(s) Oral at bedtime  calcium carbonate 1250 mG  + Vitamin D (OsCal 500 + D) 1 Tablet(s) Oral two times a day  desmopressin 0.01% Nasal 1 Spray(s) Nasal daily  dextrose 5%. 1000 milliLiter(s) (50 mL/Hr) IV Continuous <Continuous>  dextrose 50% Injectable 12.5 Gram(s) IV Push once  dextrose 50% Injectable 25 Gram(s) IV Push once  dextrose 50% Injectable 25 Gram(s) IV Push once  docusate sodium 100 milliGRAM(s) Oral two times a day  heparin  Injectable 5000 Unit(s) SubCutaneous every 8 hours  insulin glargine Injectable (LANTUS) 25 Unit(s) SubCutaneous at bedtime  insulin lispro (HumaLOG) corrective regimen sliding scale   SubCutaneous three times a day before meals  insulin lispro (HumaLOG) corrective regimen sliding scale   SubCutaneous at bedtime  melatonin 3 milliGRAM(s) Oral at bedtime  memantine 10 milliGRAM(s) Oral two times a day  metoprolol tartrate 12.5 milliGRAM(s) Oral two times a day  mirtazapine 15 milliGRAM(s) Oral at bedtime  piperacillin/tazobactam IVPB. 3.375 Gram(s) IV Intermittent every 8 hours  sodium chloride 0.9%. 1000 milliLiter(s) (75 mL/Hr) IV Continuous <Continuous>  vancomycin  IVPB 750 milliGRAM(s) IV Intermittent every 12 hours  verapamil 80 milliGRAM(s) Oral daily  zinc sulfate 220 milliGRAM(s) Oral daily      Vital Signs Last 24 Hrs  T(C): 36.7 (21 Feb 2019 11:19), Max: 36.9 (20 Feb 2019 20:40)  T(F): 98.1 (21 Feb 2019 11:19), Max: 98.5 (20 Feb 2019 20:40)  HR: 77 (21 Feb 2019 11:19) (77 - 88)  BP: 139/50 (21 Feb 2019 11:19) (139/50 - 154/54)  BP(mean): --  RR: 18 (21 Feb 2019 11:19) (16 - 18)  SpO2: 100% (21 Feb 2019 11:19) (98% - 100%)      Neurological exam:  HF:   drowsy, arousable,  unable to follow simple commands  CN: ILENE, EOMI,  Palate moves equally, SCM equal bilaterally  Motor: RUE lifts antigravity spontaneously, LUE 2/5 increased tone with flexion contracture at elbow,  L BKA, RLE 2/5  Sens: w/d to noxious on R side, LUE  Reflexes: upgoing toes R LE  Coord:  unable to assess  Gait/Balance: Cannot test                      11.4   12.42 )-----------( 311      ( 21 Feb 2019 05:39 )             37.3     02-21    142  |  109<H>  |  8   ----------------------------<  139<H>  3.4<L>   |  25  |  0.85    Ca    8.4<L>      21 Feb 2019 05:39    TPro  6.9  /  Alb  1.6<L>  /  TBili  0.4  /  DBili  x   /  AST  20  /  ALT  18  /  AlkPhos  105  02-21 02-19 [id number] 8.7    02-19 Chol 196 <H> HDL 32<L> Trig 138    Radiology report:  - CT Head  < from: CT Head No Cont (02.21.19 @ 09:33) >  Impression:  1. no acute findings.          - CT Head  CT Brain Stroke Protocol (02.18.19 @ 09:59)  IMPRESSION:   No acute intracranial hemorrhage, mass effect, or midline shift.

## 2019-02-21 NOTE — PROGRESS NOTE ADULT - SUBJECTIVE AND OBJECTIVE BOX
Cardiology Progress Note    HPI: 85F with hx of DM Type II on Insulin, PVD s/p Left  AKA, dementia (non-verbal at baseline), Afib (not on AC as outpt ), Major depression, HTN sent from [facility] for unresponsiveness and left sided weakness with left facial droop. Stroke code called, but patient noted to be febrile, tPA not given.    - Pt seen and examined by me today. She is able to look at me but was not able to verbalize answers to my questions this am.  No acute distress.    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 650 milliGRAM(s) Rectal once  ascorbic acid 500 milliGRAM(s) Oral daily  aspirin  chewable 81 milliGRAM(s) Oral daily  atorvastatin 40 milliGRAM(s) Oral at bedtime  calcium carbonate 1250 mG  + Vitamin D (OsCal 500 + D) 1 Tablet(s) Oral two times a day  desmopressin 0.01% Nasal 1 Spray(s) Nasal daily  dextrose 5%. 1000 milliLiter(s) (50 mL/Hr) IV Continuous <Continuous>  dextrose 50% Injectable 12.5 Gram(s) IV Push once  dextrose 50% Injectable 25 Gram(s) IV Push once  dextrose 50% Injectable 25 Gram(s) IV Push once  docusate sodium 100 milliGRAM(s) Oral two times a day  heparin  Injectable 5000 Unit(s) SubCutaneous every 8 hours  insulin glargine Injectable (LANTUS) 45 Unit(s) SubCutaneous at bedtime  insulin lispro (HumaLOG) corrective regimen sliding scale   SubCutaneous three times a day before meals  insulin lispro (HumaLOG) corrective regimen sliding scale   SubCutaneous at bedtime  melatonin 3 milliGRAM(s) Oral at bedtime  memantine 10 milliGRAM(s) Oral two times a day  metoprolol tartrate 12.5 milliGRAM(s) Oral two times a day  mirtazapine 15 milliGRAM(s) Oral at bedtime  piperacillin/tazobactam IVPB. 3.375 Gram(s) IV Intermittent every 8 hours  sodium chloride 0.9%. 1000 milliLiter(s) (75 mL/Hr) IV Continuous <Continuous>  verapamil 80 milliGRAM(s) Oral daily  zinc sulfate 220 milliGRAM(s) Oral daily    MEDICATIONS  (PRN):  acetaminophen   Tablet .. 650 milliGRAM(s) Oral every 6 hours PRN Temp greater or equal to 38C (100.4F), Mild Pain (1 - 3)  dextrose 40% Gel 15 Gram(s) Oral once PRN Blood Glucose LESS THAN 70 milliGRAM(s)/deciliter  glucagon  Injectable 1 milliGRAM(s) IntraMuscular once PRN Glucose LESS THAN 70 milligrams/deciliter    Vital Signs Last 24 Hrs  T(C): 37 (2019 06:48), Max: 38.8 (2019 09:50)  T(F): 98.6 (2019 06:48), Max: 101.9 (2019 09:50)  HR: 84 (2019 06:48) (70 - 86)  BP: 152/97 (2019 06:48) (122/57 - 160/48)  BP(mean): --  RR: 17 (2019 06:48) (14 - 21)  SpO2: 97% (2019 06:48) (95% - 100%)    REVIEW OF SYSTEMS:    CONSTITUTIONAL:  As per HPI.    CARDIOVASCULAR:  No pressure, squeezing, strangling, tightness, heaviness or aching about the chest, neck, axilla or epigastrium.  RESPIRATORY:  No cough, shortness of breath, PND or orthopnea.  GASTROINTESTINAL:  No nausea, vomiting or diarrhea.  GENITOURINARY:  No dysuria, frequency or urgency.  MUSCULOSKELETAL:  As per HPI.  SKIN:  No change in skin, hair or nails.  NEUROLOGIC: per HPI    PHYSICAL EXAMINATION:    GENERAL APPEARANCE: Confused, mildly lethargic.   HEENT:  Pupils are normal and react normally. No icterus. Mucous membranes well colored.  NECK:  Supple. No lymphadenopathy. Jugular venous pressure not elevated. Carotids equal.   HEART:   The cardiac impulse has a normal quality. There are no murmurs, rubs or gallops noted  CHEST:  Chest is clear to auscultation. Normal respiratory effort.  ABDOMEN:  Soft and nontender.   EXTREMITIES:  L LE s/p amputation  no edema in the RLE    I&O's Summary      LABS:                        11.7   15.23 )-----------( 309      ( 2019 10:19 )             37.9         145  |  115<H>  |  14  ----------------------------<  204<H>  3.8   |  25  |  0.68    Ca    7.8<L>      2019 18:32    TPro  7.4  /  Alb  1.8<L>  /  TBili  0.4  /  DBili  x   /  AST  29  /  ALT  21  /  AlkPhos  125<H>      LIVER FUNCTIONS - ( 2019 10:19 )  Alb: 1.8 g/dL / Pro: 7.4 gm/dL / ALK PHOS: 125 U/L / ALT: 21 U/L / AST: 29 U/L / GGT: x           PT/INR - ( 2019 10:19 )   PT: 15.2 sec;   INR: 1.36 ratio       PTT - ( 2019 10:19 )  PTT:26.4 sec    Urinalysis Basic - ( 2019 12:30 )    Color: Yellow / Appearance: Slightly Turbid / S.020 / pH: x  Gluc: x / Ketone: Trace  / Bili: Negative / Urobili: Negative mg/dL   Blood: x / Protein: 100 mg/dL / Nitrite: Negative   Leuk Esterase: Small / RBC: 3-5 /HPF / WBC 6-10   Sq Epi: x / Non Sq Epi: Few / Bacteria: Many    EKG: < from: 12 Lead ECG (19 @ 10:18) >   Atrial fibrillation  Prolonged QT  Abnormal ECG    TELEMETRY: SR    CARDIAC TESTS: < from: Transthoracic Echocardiogram (19 @ 09:46) >  Technically limited study.   The LV endocardium is poorly seen. Global LV systolic function is normal   The left ventricle is normal in size.   Estimated left ventricular ejection fraction is 50- 55 %.   Normal appearing left atrium.   Normal appearing right atrium.   Normal appearing right ventricle structure and function.   The aortic valve is not well visualized, appears normal. Valve opening   seems to be normal.   Mild mitral annular calcification is present.   Fibrocalcific changes noted to the mitral valve leaflets with preserved   leaflet excursion.   Moderate (2+) mitral regurgitation is present.   Trace tricuspid valve regurgitation is present.   No evidence of pericardial effusion.    RADIOLOGY & ADDITIONAL STUDIES: < from: CT Brain Stroke Protocol (19 @ 09:59) >  No acute intracranial hemorrhage, mass effect, or midline shift.

## 2019-02-21 NOTE — PROGRESS NOTE ADULT - SUBJECTIVE AND OBJECTIVE BOX
HOSPITALIST ATTENDING PROGRESS NOTE    Chart and meds reviewed.  Patient seen and examined.    HPI: HOSPITALIST ATTENDING PROGRESS NOTE    Chart and meds reviewed.  Patient seen and examined.    HPI: 85F with hx of DM Type II on Insulin, PVD s/p Left  AKA, dementia (non-verbal at baseline), Afib (not on AC), Major depression, HTN sent from [facility] for unresponsiveness and left sided weakness with left facial droop. Stroke code called, but patient noted to be febrile, tPA not given, d/w Daughter per Neurology note. Patient unable to provide any hx secondary to dementia and non-verbal status. Alert in ED, appears comfortable.    2/19/19 pt seen and examined, noted to have hypoglycemia. patient is eating, family at bedside, discussed the case in detail. Awaiting MRI, cardio/neuro/ID eval appreciated.    2/20/19 pt seen and examined, speech and swallow eval suggest full liquid diet, discussed case with daughter over the phone dirk and rest of family in room, MRI of head unable to tolerate, MRI of the hip performed. Patient now noted to be in Afib, discussed r/b of anticoagulation with family.     2/21/19 pt seen and examined, hypoglycemia even on D5 fluids noted overnight, Lantus was not given overnight. DC D5 today and observe FS, check A1c (noted to be 8 few months ago), MRI of the pelvis noted for OM. left message for daughter. d/w  and  today    All 10 systems reviewed and found to be negative with the exception of what has been described above.    MEDICATIONS  (STANDING):  acetaminophen  Suppository .. 650 milliGRAM(s) Rectal once  ascorbic acid 500 milliGRAM(s) Oral daily  aspirin  chewable 81 milliGRAM(s) Oral daily  atorvastatin 40 milliGRAM(s) Oral at bedtime  calcium carbonate 1250 mG  + Vitamin D (OsCal 500 + D) 1 Tablet(s) Oral two times a day  desmopressin 0.01% Nasal 1 Spray(s) Nasal daily  dextrose 5%. 1000 milliLiter(s) (50 mL/Hr) IV Continuous <Continuous>  dextrose 50% Injectable 12.5 Gram(s) IV Push once  dextrose 50% Injectable 25 Gram(s) IV Push once  dextrose 50% Injectable 25 Gram(s) IV Push once  docusate sodium 100 milliGRAM(s) Oral two times a day  heparin  Injectable 5000 Unit(s) SubCutaneous every 8 hours  insulin glargine Injectable (LANTUS) 25 Unit(s) SubCutaneous at bedtime  insulin lispro (HumaLOG) corrective regimen sliding scale   SubCutaneous three times a day before meals  insulin lispro (HumaLOG) corrective regimen sliding scale   SubCutaneous at bedtime  melatonin 3 milliGRAM(s) Oral at bedtime  memantine 10 milliGRAM(s) Oral two times a day  metoprolol tartrate 12.5 milliGRAM(s) Oral two times a day  mirtazapine 15 milliGRAM(s) Oral at bedtime  piperacillin/tazobactam IVPB. 3.375 Gram(s) IV Intermittent every 8 hours  potassium chloride   Powder 20 milliEquivalent(s) Oral once  sodium chloride 0.9%. 1000 milliLiter(s) (75 mL/Hr) IV Continuous <Continuous>  vancomycin  IVPB 750 milliGRAM(s) IV Intermittent every 12 hours  verapamil 80 milliGRAM(s) Oral daily  zinc sulfate 220 milliGRAM(s) Oral daily    MEDICATIONS  (PRN):  acetaminophen   Tablet .. 650 milliGRAM(s) Oral every 6 hours PRN Temp greater or equal to 38C (100.4F), Mild Pain (1 - 3)  dextrose 40% Gel 15 Gram(s) Oral once PRN Blood Glucose LESS THAN 70 milliGRAM(s)/deciliter  glucagon  Injectable 1 milliGRAM(s) IntraMuscular once PRN Glucose LESS THAN 70 milligrams/deciliter      VITALS:  T(F): 98.1 (02-21-19 @ 11:19), Max: 98.5 (02-20-19 @ 20:40)  HR: 77 (02-21-19 @ 11:19) (77 - 88)  BP: 139/50 (02-21-19 @ 11:19) (139/50 - 154/54)  RR: 18 (02-21-19 @ 11:19) (16 - 18)  SpO2: 100% (02-21-19 @ 11:19) (98% - 100%)  Wt(kg): --    I&O's Summary      CAPILLARY BLOOD GLUCOSE      POCT Blood Glucose.: 195 mg/dL (21 Feb 2019 12:17)  POCT Blood Glucose.: 172 mg/dL (21 Feb 2019 08:11)  POCT Blood Glucose.: 93 mg/dL (20 Feb 2019 21:53)  POCT Blood Glucose.: 93 mg/dL (20 Feb 2019 17:43)      PHYSICAL EXAM:    HEENT:  pupils equal and reactive, EOMI, no oropharyngeal lesions, erythema, exudates, oral thrush  NECK:   supple, no carotid bruits, no palpable lymph nodes, no thyromegaly  CV:  +S1, +S2, regular, no murmurs or rubs  RESP:   lungs clear to auscultation bilaterally, no wheezing, rales, rhonchi, good air entry bilaterally  BREAST:  not examined  GI:  abdomen soft, non-tender, non-distended, normal BS, no bruits, no abdominal masses, no palpable masses  RECTAL:  not examined  :  not examined  MSK:   normal muscle tone, no atrophy, no rigidity, no contractions  EXT:  no clubbing, no cyanosis, no edema, no calf pain, swelling or erythema  VASCULAR:  pulses equal and symmetric in the upper and lower extremities  NEURO:  AAOX3, no focal neurological deficits, follows all commands, able to move extremities spontaneously  SKIN:  no ulcers, lesions or rashes    LABS:                            11.4   12.42 )-----------( 311      ( 21 Feb 2019 05:39 )             37.3     02-21    142  |  109<H>  |  8   ----------------------------<  139<H>  3.4<L>   |  25  |  0.85    Ca    8.4<L>      21 Feb 2019 05:39    TPro  6.9  /  Alb  1.6<L>  /  TBili  0.4  /  DBili  x   /  AST  20  /  ALT  18  /  AlkPhos  105  02-21        LIVER FUNCTIONS - ( 21 Feb 2019 05:39 )  Alb: 1.6 g/dL / Pro: 6.9 gm/dL / ALK PHOS: 105 U/L / ALT: 18 U/L / AST: 20 U/L / GGT: x                                             CULTURES:      All 10 systems reviewed and found to be negative with the exception of what has been described above.    MEDICATIONS  (STANDING):  acetaminophen  Suppository .. 650 milliGRAM(s) Rectal once  ascorbic acid 500 milliGRAM(s) Oral daily  aspirin  chewable 81 milliGRAM(s) Oral daily  atorvastatin 40 milliGRAM(s) Oral at bedtime  calcium carbonate 1250 mG  + Vitamin D (OsCal 500 + D) 1 Tablet(s) Oral two times a day  desmopressin 0.01% Nasal 1 Spray(s) Nasal daily  dextrose 5%. 1000 milliLiter(s) (50 mL/Hr) IV Continuous <Continuous>  dextrose 50% Injectable 12.5 Gram(s) IV Push once  dextrose 50% Injectable 25 Gram(s) IV Push once  dextrose 50% Injectable 25 Gram(s) IV Push once  docusate sodium 100 milliGRAM(s) Oral two times a day  heparin  Injectable 5000 Unit(s) SubCutaneous every 8 hours  insulin glargine Injectable (LANTUS) 25 Unit(s) SubCutaneous at bedtime  insulin lispro (HumaLOG) corrective regimen sliding scale   SubCutaneous three times a day before meals  insulin lispro (HumaLOG) corrective regimen sliding scale   SubCutaneous at bedtime  melatonin 3 milliGRAM(s) Oral at bedtime  memantine 10 milliGRAM(s) Oral two times a day  metoprolol tartrate 12.5 milliGRAM(s) Oral two times a day  mirtazapine 15 milliGRAM(s) Oral at bedtime  piperacillin/tazobactam IVPB. 3.375 Gram(s) IV Intermittent every 8 hours  potassium chloride   Powder 20 milliEquivalent(s) Oral once  sodium chloride 0.9%. 1000 milliLiter(s) (75 mL/Hr) IV Continuous <Continuous>  vancomycin  IVPB 750 milliGRAM(s) IV Intermittent every 12 hours  verapamil 80 milliGRAM(s) Oral daily  zinc sulfate 220 milliGRAM(s) Oral daily    MEDICATIONS  (PRN):  acetaminophen   Tablet .. 650 milliGRAM(s) Oral every 6 hours PRN Temp greater or equal to 38C (100.4F), Mild Pain (1 - 3)  dextrose 40% Gel 15 Gram(s) Oral once PRN Blood Glucose LESS THAN 70 milliGRAM(s)/deciliter  glucagon  Injectable 1 milliGRAM(s) IntraMuscular once PRN Glucose LESS THAN 70 milligrams/deciliter      VITALS:  T(F): 98.1 (02-21-19 @ 11:19), Max: 98.5 (02-20-19 @ 20:40)  HR: 77 (02-21-19 @ 11:19) (77 - 88)  BP: 139/50 (02-21-19 @ 11:19) (139/50 - 154/54)  RR: 18 (02-21-19 @ 11:19) (16 - 18)  SpO2: 100% (02-21-19 @ 11:19) (98% - 100%)  Wt(kg): --    I&O's Summary      CAPILLARY BLOOD GLUCOSE      POCT Blood Glucose.: 195 mg/dL (21 Feb 2019 12:17)  POCT Blood Glucose.: 172 mg/dL (21 Feb 2019 08:11)  POCT Blood Glucose.: 93 mg/dL (20 Feb 2019 21:53)  POCT Blood Glucose.: 93 mg/dL (20 Feb 2019 17:43)      PHYSICAL EXAM:    HEENT:  pupils equal and reactive, EOMI, no oropharyngeal lesions, erythema, exudates, oral thrush  NECK:   supple, no carotid bruits, no palpable lymph nodes, no thyromegaly  CV:  +S1, +S2, regular, no murmurs or rubs  RESP:   lungs clear to auscultation bilaterally, no wheezing, rales, rhonchi, good air entry bilaterally  BREAST:  not examined  GI:  abdomen soft, non-tender, non-distended, normal BS, no bruits, no abdominal masses, no palpable masses  RECTAL:  not examined  :  not examined  MSK:   normal muscle tone, no atrophy, no rigidity, no contractions  EXT:  no clubbing, no cyanosis, no edema, no calf pain, swelling or erythema  VASCULAR:  pulses equal and symmetric in the upper and lower extremities  NEURO:  AAOX3, no focal neurological deficits, follows all commands, able to move extremities spontaneously  SKIN:  no ulcers, lesions or rashes    LABS:                            11.4   12.42 )-----------( 311      ( 21 Feb 2019 05:39 )             37.3     02-21    142  |  109<H>  |  8   ----------------------------<  139<H>  3.4<L>   |  25  |  0.85    Ca    8.4<L>      21 Feb 2019 05:39    TPro  6.9  /  Alb  1.6<L>  /  TBili  0.4  /  DBili  x   /  AST  20  /  ALT  18  /  AlkPhos  105  02-21        LIVER FUNCTIONS - ( 21 Feb 2019 05:39 )  Alb: 1.6 g/dL / Pro: 6.9 gm/dL / ALK PHOS: 105 U/L / ALT: 18 U/L / AST: 20 U/L / GGT: x                                             CULTURES:

## 2019-02-22 LAB
ALBUMIN SERPL ELPH-MCNC: 1.7 G/DL — LOW (ref 3.3–5)
ALP SERPL-CCNC: 102 U/L — SIGNIFICANT CHANGE UP (ref 40–120)
ALT FLD-CCNC: 17 U/L — SIGNIFICANT CHANGE UP (ref 12–78)
ANION GAP SERPL CALC-SCNC: 7 MMOL/L — SIGNIFICANT CHANGE UP (ref 5–17)
AST SERPL-CCNC: 18 U/L — SIGNIFICANT CHANGE UP (ref 15–37)
BILIRUB SERPL-MCNC: 0.5 MG/DL — SIGNIFICANT CHANGE UP (ref 0.2–1.2)
BUN SERPL-MCNC: 12 MG/DL — SIGNIFICANT CHANGE UP (ref 7–23)
CALCIUM SERPL-MCNC: 8.4 MG/DL — LOW (ref 8.5–10.1)
CHLORIDE SERPL-SCNC: 109 MMOL/L — HIGH (ref 96–108)
CO2 SERPL-SCNC: 27 MMOL/L — SIGNIFICANT CHANGE UP (ref 22–31)
CREAT SERPL-MCNC: 0.88 MG/DL — SIGNIFICANT CHANGE UP (ref 0.5–1.3)
GLUCOSE SERPL-MCNC: 158 MG/DL — HIGH (ref 70–99)
HBA1C BLD-MCNC: 8.7 % — HIGH (ref 4–5.6)
HCT VFR BLD CALC: 35.9 % — SIGNIFICANT CHANGE UP (ref 34.5–45)
HGB BLD-MCNC: 11.1 G/DL — LOW (ref 11.5–15.5)
MCHC RBC-ENTMCNC: 28.1 PG — SIGNIFICANT CHANGE UP (ref 27–34)
MCHC RBC-ENTMCNC: 30.9 GM/DL — LOW (ref 32–36)
MCV RBC AUTO: 90.9 FL — SIGNIFICANT CHANGE UP (ref 80–100)
NRBC # BLD: 0 /100 WBCS — SIGNIFICANT CHANGE UP (ref 0–0)
PLATELET # BLD AUTO: 333 K/UL — SIGNIFICANT CHANGE UP (ref 150–400)
POTASSIUM SERPL-MCNC: 3.4 MMOL/L — LOW (ref 3.5–5.3)
POTASSIUM SERPL-SCNC: 3.4 MMOL/L — LOW (ref 3.5–5.3)
PROT SERPL-MCNC: 6.7 GM/DL — SIGNIFICANT CHANGE UP (ref 6–8.3)
RBC # BLD: 3.95 M/UL — SIGNIFICANT CHANGE UP (ref 3.8–5.2)
RBC # FLD: 14 % — SIGNIFICANT CHANGE UP (ref 10.3–14.5)
SODIUM SERPL-SCNC: 143 MMOL/L — SIGNIFICANT CHANGE UP (ref 135–145)
WBC # BLD: 13.11 K/UL — HIGH (ref 3.8–10.5)
WBC # FLD AUTO: 13.11 K/UL — HIGH (ref 3.8–10.5)

## 2019-02-22 PROCEDURE — 99232 SBSQ HOSP IP/OBS MODERATE 35: CPT

## 2019-02-22 RX ADMIN — INSULIN GLARGINE 25 UNIT(S): 100 INJECTION, SOLUTION SUBCUTANEOUS at 22:34

## 2019-02-22 RX ADMIN — Medication 80 MILLIGRAM(S): at 06:08

## 2019-02-22 RX ADMIN — Medication 12.5 MILLIGRAM(S): at 18:36

## 2019-02-22 RX ADMIN — HEPARIN SODIUM 5000 UNIT(S): 5000 INJECTION INTRAVENOUS; SUBCUTANEOUS at 06:08

## 2019-02-22 RX ADMIN — Medication 81 MILLIGRAM(S): at 11:48

## 2019-02-22 RX ADMIN — ZINC SULFATE TAB 220 MG (50 MG ZINC EQUIVALENT) 220 MILLIGRAM(S): 220 (50 ZN) TAB at 11:49

## 2019-02-22 RX ADMIN — Medication 2: at 08:54

## 2019-02-22 RX ADMIN — Medication 1 TABLET(S): at 06:08

## 2019-02-22 RX ADMIN — MEMANTINE HYDROCHLORIDE 10 MILLIGRAM(S): 10 TABLET ORAL at 18:36

## 2019-02-22 RX ADMIN — Medication 3 MILLIGRAM(S): at 22:32

## 2019-02-22 RX ADMIN — Medication 500 MILLIGRAM(S): at 11:48

## 2019-02-22 RX ADMIN — Medication 250 MILLIGRAM(S): at 18:42

## 2019-02-22 RX ADMIN — Medication 1 TABLET(S): at 18:35

## 2019-02-22 RX ADMIN — HEPARIN SODIUM 5000 UNIT(S): 5000 INJECTION INTRAVENOUS; SUBCUTANEOUS at 13:53

## 2019-02-22 RX ADMIN — MEMANTINE HYDROCHLORIDE 10 MILLIGRAM(S): 10 TABLET ORAL at 06:08

## 2019-02-22 RX ADMIN — PIPERACILLIN AND TAZOBACTAM 25 GRAM(S): 4; .5 INJECTION, POWDER, LYOPHILIZED, FOR SOLUTION INTRAVENOUS at 13:58

## 2019-02-22 RX ADMIN — Medication 12.5 MILLIGRAM(S): at 06:08

## 2019-02-22 RX ADMIN — Medication 2: at 11:45

## 2019-02-22 RX ADMIN — ATORVASTATIN CALCIUM 40 MILLIGRAM(S): 80 TABLET, FILM COATED ORAL at 22:32

## 2019-02-22 RX ADMIN — HEPARIN SODIUM 5000 UNIT(S): 5000 INJECTION INTRAVENOUS; SUBCUTANEOUS at 22:33

## 2019-02-22 RX ADMIN — Medication 100 MILLIGRAM(S): at 18:37

## 2019-02-22 RX ADMIN — Medication 100 MILLIGRAM(S): at 06:08

## 2019-02-22 RX ADMIN — PIPERACILLIN AND TAZOBACTAM 25 GRAM(S): 4; .5 INJECTION, POWDER, LYOPHILIZED, FOR SOLUTION INTRAVENOUS at 06:08

## 2019-02-22 RX ADMIN — Medication 250 MILLIGRAM(S): at 05:03

## 2019-02-22 RX ADMIN — MIRTAZAPINE 15 MILLIGRAM(S): 45 TABLET, ORALLY DISINTEGRATING ORAL at 22:32

## 2019-02-22 RX ADMIN — PIPERACILLIN AND TAZOBACTAM 25 GRAM(S): 4; .5 INJECTION, POWDER, LYOPHILIZED, FOR SOLUTION INTRAVENOUS at 22:29

## 2019-02-22 NOTE — CONSULT NOTE ADULT - ASSESSMENT
84 yo female with h/o DM2, PVD s/p Left  AKA, dementia (non-verbal at baseline), Afib (not on AC), Major depression, HTN [facility] from [facility] for evaluation of left facial droop. Has been hypoglycemic at times here and asked to evaluate.     1.  DM2       --A1C of 8.7% is c/w uncontrolled DM2       --episodes of hypoglycemia this admission are likely from decreased PO intake        --BS's were higher yesterday for which lantus was restarted last night at less than typical dose (was given lantus 25 units, normally takes 45 units of levemir).  AM BS is ok.         --would continue lantus and HISS same        --DM diet        --further titrate insulin as needed in ensuing days     2.  Dyslipidemia        --low HDL and LDL above goal. On statin therapy now.     3.  HTN       --BP elevated this AM.  Med management per primary team/ cardiology.

## 2019-02-22 NOTE — PROGRESS NOTE ADULT - SUBJECTIVE AND OBJECTIVE BOX
HOSPITALIST ATTENDING PROGRESS NOTE    Chart and meds reviewed.  Patient seen and examined.    HPI:  85F with hx of DM Type II on Insulin, PVD s/p Left  AKA, dementia (non-verbal at baseline), Afib (not on AC), Major depression, HTN sent from [facility] for unresponsiveness and left sided weakness with left facial droop. Stroke code called, but patient noted to be febrile, tPA not given, d/w Daughter per Neurology note. Patient unable to provide any hx secondary to dementia and non-verbal status. Alert in ED, appears comfortable.    2/19/19 pt seen and examined, noted to have hypoglycemia. patient is eating, family at bedside, discussed the case in detail. Awaiting MRI, cardio/neuro/ID eval appreciated.    2/20/19 pt seen and examined, speech and swallow eval suggest full liquid diet, discussed case with daughter over the phone dirk and rest of family in room, MRI of head unable to tolerate, MRI of the hip performed. Patient now noted to be in Afib, discussed r/b of anticoagulation with family.     2/21/19 pt seen and examined, hypoglycemia even on D5 fluids noted overnight, Lantus was not given overnight. DC D5 today and observe FS, check A1c (noted to be 8 few months ago), MRI of the pelvis noted for OM. left message for daughter. d/w  and  today    2/22/19 pt seen and examined, endo eval appreciated, lowered lantus dose, A1c noted, d/w , d/w daughter patito on the phone. Discussed options of treating Osteomyelitis.       All 10 systems reviewed and found to be negative with the exception of what has been described above.    MEDICATIONS  (STANDING):  acetaminophen  Suppository .. 650 milliGRAM(s) Rectal once  ascorbic acid 500 milliGRAM(s) Oral daily  aspirin  chewable 81 milliGRAM(s) Oral daily  atorvastatin 40 milliGRAM(s) Oral at bedtime  calcium carbonate 1250 mG  + Vitamin D (OsCal 500 + D) 1 Tablet(s) Oral two times a day  desmopressin 0.01% Nasal 1 Spray(s) Nasal daily  dextrose 5%. 1000 milliLiter(s) (50 mL/Hr) IV Continuous <Continuous>  dextrose 50% Injectable 12.5 Gram(s) IV Push once  dextrose 50% Injectable 25 Gram(s) IV Push once  dextrose 50% Injectable 25 Gram(s) IV Push once  docusate sodium 100 milliGRAM(s) Oral two times a day  heparin  Injectable 5000 Unit(s) SubCutaneous every 8 hours  insulin glargine Injectable (LANTUS) 25 Unit(s) SubCutaneous at bedtime  insulin lispro (HumaLOG) corrective regimen sliding scale   SubCutaneous three times a day before meals  insulin lispro (HumaLOG) corrective regimen sliding scale   SubCutaneous at bedtime  melatonin 3 milliGRAM(s) Oral at bedtime  memantine 10 milliGRAM(s) Oral two times a day  metoprolol tartrate 12.5 milliGRAM(s) Oral two times a day  mirtazapine 15 milliGRAM(s) Oral at bedtime  piperacillin/tazobactam IVPB. 3.375 Gram(s) IV Intermittent every 8 hours  sodium chloride 0.9%. 1000 milliLiter(s) (75 mL/Hr) IV Continuous <Continuous>  vancomycin  IVPB 750 milliGRAM(s) IV Intermittent every 12 hours  verapamil 80 milliGRAM(s) Oral daily  zinc sulfate 220 milliGRAM(s) Oral daily    MEDICATIONS  (PRN):  acetaminophen   Tablet .. 650 milliGRAM(s) Oral every 6 hours PRN Temp greater or equal to 38C (100.4F), Mild Pain (1 - 3)  dextrose 40% Gel 15 Gram(s) Oral once PRN Blood Glucose LESS THAN 70 milliGRAM(s)/deciliter  glucagon  Injectable 1 milliGRAM(s) IntraMuscular once PRN Glucose LESS THAN 70 milligrams/deciliter      VITALS:  T(F): 98.8 (02-22-19 @ 11:52), Max: 98.8 (02-22-19 @ 11:52)  HR: 79 (02-22-19 @ 11:52) (79 - 88)  BP: 147/66 (02-22-19 @ 11:52) (132/68 - 158/57)  RR: 18 (02-22-19 @ 11:52) (18 - 18)  SpO2: 90% (02-22-19 @ 11:52) (90% - 99%)  Wt(kg): --    I&O's Summary      CAPILLARY BLOOD GLUCOSE      POCT Blood Glucose.: 107 mg/dL (22 Feb 2019 17:13)  POCT Blood Glucose.: 152 mg/dL (22 Feb 2019 11:18)  POCT Blood Glucose.: 189 mg/dL (22 Feb 2019 08:27)  POCT Blood Glucose.: 207 mg/dL (21 Feb 2019 21:23)  POCT Blood Glucose.: 123 mg/dL (21 Feb 2019 17:58)      PHYSICAL EXAM:    HEENT:  pupils equal and reactive, EOMI, no oropharyngeal lesions, erythema, exudates, oral thrush  NECK:   supple, no carotid bruits, no palpable lymph nodes, no thyromegaly  CV:  +S1, +S2, regular, no murmurs or rubs  RESP:   lungs clear to auscultation bilaterally, no wheezing, rales, rhonchi, good air entry bilaterally  BREAST:  not examined  GI:  abdomen soft, non-tender, non-distended, normal BS, no bruits, no abdominal masses, no palpable masses  RECTAL:  not examined  :  not examined  MSK:   normal muscle tone, no atrophy, no rigidity, no contractions  EXT:  no clubbing, no cyanosis, no edema, no calf pain, swelling or erythema  VASCULAR:  pulses equal and symmetric in the upper and lower extremities  NEURO:  AAOX3, no focal neurological deficits, follows all commands, able to move extremities spontaneously  SKIN:  no ulcers, lesions or rashes    LABS:                            11.1   13.11 )-----------( 333      ( 22 Feb 2019 05:09 )             35.9     02-22    143  |  109<H>  |  12  ----------------------------<  158<H>  3.4<L>   |  27  |  0.88    Ca    8.4<L>      22 Feb 2019 05:09    TPro  6.7  /  Alb  1.7<L>  /  TBili  0.5  /  DBili  x   /  AST  18  /  ALT  17  /  AlkPhos  102  02-22        LIVER FUNCTIONS - ( 22 Feb 2019 05:09 )  Alb: 1.7 g/dL / Pro: 6.7 gm/dL / ALK PHOS: 102 U/L / ALT: 17 U/L / AST: 18 U/L / GGT: x                                             CULTURES:

## 2019-02-22 NOTE — PROGRESS NOTE ADULT - SUBJECTIVE AND OBJECTIVE BOX
Cardiology Progress Note    HPI: 85F with hx of DM Type II on Insulin, PVD s/p Left  AKA, dementia (non-verbal at baseline), Afib (not on AC), Major depression, HTN sent from [facility] for unresponsiveness and left sided weakness with left facial droop. Stroke code called, but patient noted to be febrile, tPA not given, d/w Daughter per Neurology note. Patient unable to provide any hx secondary to dementia and non-verbal status. Alert in ED, appears comfortable. (2019 17:41)    . Confused, unable to answer questions due to dementia.   PAF on tele.     . Confused, lethargic. Case d/w family at bedside. SR on tele.     . No events last pm. No CP. Resting comfortably.     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 650 milliGRAM(s) Rectal once  ascorbic acid 500 milliGRAM(s) Oral daily  aspirin  chewable 81 milliGRAM(s) Oral daily  atorvastatin 40 milliGRAM(s) Oral at bedtime  calcium carbonate 1250 mG  + Vitamin D (OsCal 500 + D) 1 Tablet(s) Oral two times a day  desmopressin 0.01% Nasal 1 Spray(s) Nasal daily  dextrose 5%. 1000 milliLiter(s) (50 mL/Hr) IV Continuous <Continuous>  dextrose 50% Injectable 12.5 Gram(s) IV Push once  dextrose 50% Injectable 25 Gram(s) IV Push once  dextrose 50% Injectable 25 Gram(s) IV Push once  docusate sodium 100 milliGRAM(s) Oral two times a day  heparin  Injectable 5000 Unit(s) SubCutaneous every 8 hours  insulin glargine Injectable (LANTUS) 45 Unit(s) SubCutaneous at bedtime  insulin lispro (HumaLOG) corrective regimen sliding scale   SubCutaneous three times a day before meals  insulin lispro (HumaLOG) corrective regimen sliding scale   SubCutaneous at bedtime  melatonin 3 milliGRAM(s) Oral at bedtime  memantine 10 milliGRAM(s) Oral two times a day  metoprolol tartrate 12.5 milliGRAM(s) Oral two times a day  mirtazapine 15 milliGRAM(s) Oral at bedtime  piperacillin/tazobactam IVPB. 3.375 Gram(s) IV Intermittent every 8 hours  sodium chloride 0.9%. 1000 milliLiter(s) (75 mL/Hr) IV Continuous <Continuous>  verapamil 80 milliGRAM(s) Oral daily  zinc sulfate 220 milliGRAM(s) Oral daily    MEDICATIONS  (PRN):  acetaminophen   Tablet .. 650 milliGRAM(s) Oral every 6 hours PRN Temp greater or equal to 38C (100.4F), Mild Pain (1 - 3)  dextrose 40% Gel 15 Gram(s) Oral once PRN Blood Glucose LESS THAN 70 milliGRAM(s)/deciliter  glucagon  Injectable 1 milliGRAM(s) IntraMuscular once PRN Glucose LESS THAN 70 milligrams/deciliter    Vital Signs Last 24 Hrs  T(C): 37 (2019 06:48), Max: 38.8 (2019 09:50)  T(F): 98.6 (2019 06:48), Max: 101.9 (2019 09:50)  HR: 84 (2019 06:48) (70 - 86)  BP: 152/97 (2019 06:48) (122/57 - 160/48)  BP(mean): --  RR: 17 (2019 06:48) (14 - 21)  SpO2: 97% (2019 06:48) (95% - 100%)    REVIEW OF SYSTEMS:    CONSTITUTIONAL:  As per HPI.  HEENT:  Eyes:  No diplopia or blurred vision. ENT:  No earache, sore throat or runny nose.  CARDIOVASCULAR:  No pressure, squeezing, strangling, tightness, heaviness or aching about the chest, neck, axilla or epigastrium.  RESPIRATORY:  No cough, shortness of breath, PND or orthopnea.  GASTROINTESTINAL:  No nausea, vomiting or diarrhea.  GENITOURINARY:  No dysuria, frequency or urgency.  MUSCULOSKELETAL:  As per HPI.  SKIN:  No change in skin, hair or nails.  NEUROLOGIC:  No paresthesias, fasciculations, seizures or weakness.    PHYSICAL EXAMINATION:    GENERAL APPEARANCE: Confused, mildly lethargic.   HEENT:  Pupils are normal and react normally. No icterus. Mucous membranes well colored.  NECK:  Supple. No lymphadenopathy. Jugular venous pressure not elevated. Carotids equal.   HEART:   The cardiac impulse has a normal quality. There are no murmurs, rubs or gallops noted  CHEST:  Chest is clear to auscultation. Normal respiratory effort.  ABDOMEN:  Soft and nontender.   EXTREMITIES:  There is no edema.     I&O's Summary      LABS:                        11.7   15.23 )-----------( 309      ( 2019 10:19 )             37.9         145  |  115<H>  |  14  ----------------------------<  204<H>  3.8   |  25  |  0.68    Ca    7.8<L>      2019 18:32    TPro  7.4  /  Alb  1.8<L>  /  TBili  0.4  /  DBili  x   /  AST  29  /  ALT  21  /  AlkPhos  125<H>      LIVER FUNCTIONS - ( 2019 10:19 )  Alb: 1.8 g/dL / Pro: 7.4 gm/dL / ALK PHOS: 125 U/L / ALT: 21 U/L / AST: 29 U/L / GGT: x           PT/INR - ( 2019 10:19 )   PT: 15.2 sec;   INR: 1.36 ratio       PTT - ( 2019 10:19 )  PTT:26.4 sec    Urinalysis Basic - ( 2019 12:30 )    Color: Yellow / Appearance: Slightly Turbid / S.020 / pH: x  Gluc: x / Ketone: Trace  / Bili: Negative / Urobili: Negative mg/dL   Blood: x / Protein: 100 mg/dL / Nitrite: Negative   Leuk Esterase: Small / RBC: 3-5 /HPF / WBC 6-10   Sq Epi: x / Non Sq Epi: Few / Bacteria: Many    EKG: < from: 12 Lead ECG (19 @ 10:18) >   Atrial fibrillation  Prolonged QT  Abnormal ECG    TELEMETRY: SR    CARDIAC TESTS: < from: Transthoracic Echocardiogram (19 @ 09:46) >  Technically limited study.   The LV endocardium is poorly seen. Global LV systolic function is normal   The left ventricle is normal in size.   Estimated left ventricular ejection fraction is 50- 55 %.   Normal appearing left atrium.   Normal appearing right atrium.   Normal appearing right ventricle structure and function.   The aortic valve is not well visualized, appears normal. Valve opening   seems to be normal.   Mild mitral annular calcification is present.   Fibrocalcific changes noted to the mitral valve leaflets with preserved   leaflet excursion.   Moderate (2+) mitral regurgitation is present.   Trace tricuspid valve regurgitation is present.   No evidence of pericardial effusion.    RADIOLOGY & ADDITIONAL STUDIES: < from: CT Brain Stroke Protocol (19 @ 09:59) >  No acute intracranial hemorrhage, mass effect, or midline shift.     ASSESSMENT & PLAN:

## 2019-02-22 NOTE — CONSULT NOTE ADULT - SUBJECTIVE AND OBJECTIVE BOX
86 yo female with h/o DM2, PVD s/p Left  AKA, dementia (non-verbal at baseline), Afib (not on AC), Major depression, HTN admitted on 2/18 from SNF for evaluation of left facial droop. Has been hypoglycemic at times here and asked to evaluate.     History obtained per chart as pt is unable to provide and history 2/2 dementia.  Has DM2 and in SNF is on levemir 45 units q24 hrs and a NISS.  Appetite has not been great here.  Lantus was held for a few days but given 25 units last night.  Last episode of hypoglycemia was > 24 hours ago (2/20/19 at 1:46 AM).      MEDICATIONS  (STANDING):  acetaminophen  Suppository .. 650 milliGRAM(s) Rectal once  ascorbic acid 500 milliGRAM(s) Oral daily  aspirin  chewable 81 milliGRAM(s) Oral daily  atorvastatin 40 milliGRAM(s) Oral at bedtime  calcium carbonate 1250 mG  + Vitamin D (OsCal 500 + D) 1 Tablet(s) Oral two times a day  desmopressin 0.01% Nasal 1 Spray(s) Nasal daily  dextrose 5%. 1000 milliLiter(s) (50 mL/Hr) IV Continuous <Continuous>  dextrose 50% Injectable 12.5 Gram(s) IV Push once  dextrose 50% Injectable 25 Gram(s) IV Push once  dextrose 50% Injectable 25 Gram(s) IV Push once  docusate sodium 100 milliGRAM(s) Oral two times a day  heparin  Injectable 5000 Unit(s) SubCutaneous every 8 hours  insulin glargine Injectable (LANTUS) 25 Unit(s) SubCutaneous at bedtime  insulin lispro (HumaLOG) corrective regimen sliding scale   SubCutaneous three times a day before meals  insulin lispro (HumaLOG) corrective regimen sliding scale   SubCutaneous at bedtime  melatonin 3 milliGRAM(s) Oral at bedtime  memantine 10 milliGRAM(s) Oral two times a day  metoprolol tartrate 12.5 milliGRAM(s) Oral two times a day  mirtazapine 15 milliGRAM(s) Oral at bedtime  piperacillin/tazobactam IVPB. 3.375 Gram(s) IV Intermittent every 8 hours  sodium chloride 0.9%. 1000 milliLiter(s) (75 mL/Hr) IV Continuous <Continuous>  vancomycin  IVPB 750 milliGRAM(s) IV Intermittent every 12 hours  verapamil 80 milliGRAM(s) Oral daily  zinc sulfate 220 milliGRAM(s) Oral daily  MEDICATIONS  (PRN):  acetaminophen   Tablet .. 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  Allergies  iodine (Other (Unknown))  sulfa drugs (Other (Unknown))  No pertinent family history in first degree relatives  SOCIAL: no smoking  ROS: unable to obtain 2./2 dementia     PE:  Vital Signs Last 24 Hrs  T(C): 36.8 (22 Feb 2019 05:11), Max: 37.1 (21 Feb 2019 19:47)  T(F): 98.2 (22 Feb 2019 05:11), Max: 98.7 (21 Feb 2019 19:47)  HR: 79 (22 Feb 2019 05:11) (77 - 88)  BP: 158/57 (22 Feb 2019 05:11) (132/68 - 158/57)  BP(mean): --  RR: 18 (21 Feb 2019 11:19) (18 - 18)  SpO2: 98% (22 Feb 2019 05:11) (98% - 100%)  CAPILLARY BLOOD GLUCOSE  POCT Blood Glucose.: 207 mg/dL (21 Feb 2019 21:23)  POCT Blood Glucose.: 123 mg/dL (21 Feb 2019 17:58)  POCT Blood Glucose.: 195 mg/dL (21 Feb 2019 12:17)  POCT Blood Glucose.: 172 mg/dL (21 Feb 2019 08:11)  opens eye to voice.  calm.  NAD.  Non-verbal.  No TM.  S1+S2. CTAB. Soft. NT. left LE amputation.

## 2019-02-22 NOTE — PROGRESS NOTE ADULT - SUBJECTIVE AND OBJECTIVE BOX
Patient is a 85y old  Female who presents with a chief complaint of CVA (19 Feb 2019 10:17)    Date of service: 02-22-19 @ 13:35      Patient lethargic; lying in bed      ROS unable to obtain secondary to patient medical condition     MEDICATIONS  (STANDING):  acetaminophen  Suppository .. 650 milliGRAM(s) Rectal once  ascorbic acid 500 milliGRAM(s) Oral daily  aspirin  chewable 81 milliGRAM(s) Oral daily  atorvastatin 40 milliGRAM(s) Oral at bedtime  calcium carbonate 1250 mG  + Vitamin D (OsCal 500 + D) 1 Tablet(s) Oral two times a day  desmopressin 0.01% Nasal 1 Spray(s) Nasal daily  dextrose 5%. 1000 milliLiter(s) (50 mL/Hr) IV Continuous <Continuous>  dextrose 50% Injectable 12.5 Gram(s) IV Push once  dextrose 50% Injectable 25 Gram(s) IV Push once  dextrose 50% Injectable 25 Gram(s) IV Push once  docusate sodium 100 milliGRAM(s) Oral two times a day  heparin  Injectable 5000 Unit(s) SubCutaneous every 8 hours  insulin glargine Injectable (LANTUS) 25 Unit(s) SubCutaneous at bedtime  insulin lispro (HumaLOG) corrective regimen sliding scale   SubCutaneous three times a day before meals  insulin lispro (HumaLOG) corrective regimen sliding scale   SubCutaneous at bedtime  melatonin 3 milliGRAM(s) Oral at bedtime  memantine 10 milliGRAM(s) Oral two times a day  metoprolol tartrate 12.5 milliGRAM(s) Oral two times a day  mirtazapine 15 milliGRAM(s) Oral at bedtime  piperacillin/tazobactam IVPB. 3.375 Gram(s) IV Intermittent every 8 hours  sodium chloride 0.9%. 1000 milliLiter(s) (75 mL/Hr) IV Continuous <Continuous>  vancomycin  IVPB 750 milliGRAM(s) IV Intermittent every 12 hours  verapamil 80 milliGRAM(s) Oral daily  zinc sulfate 220 milliGRAM(s) Oral daily    MEDICATIONS  (PRN):  acetaminophen   Tablet .. 650 milliGRAM(s) Oral every 6 hours PRN Temp greater or equal to 38C (100.4F), Mild Pain (1 - 3)  dextrose 40% Gel 15 Gram(s) Oral once PRN Blood Glucose LESS THAN 70 milliGRAM(s)/deciliter  glucagon  Injectable 1 milliGRAM(s) IntraMuscular once PRN Glucose LESS THAN 70 milligrams/deciliter      Vital Signs Last 24 Hrs  T(C): 37.1 (22 Feb 2019 11:52), Max: 37.1 (21 Feb 2019 19:47)  T(F): 98.8 (22 Feb 2019 11:52), Max: 98.8 (22 Feb 2019 11:52)  HR: 79 (22 Feb 2019 11:52) (79 - 88)  BP: 147/66 (22 Feb 2019 11:52) (132/68 - 158/57)  BP(mean): --  RR: 18 (22 Feb 2019 11:52) (18 - 18)  SpO2: 90% (22 Feb 2019 11:52) (90% - 99%)    Physical Exam:      PE:    Constitutional: frail looking  HEENT: NC/AT, EOMI, PERRLA, conjunctivae clear; ears and nose atraumatic; pharynx clear  Neck: supple; thyroid not palpable  Back: no tenderness  Respiratory: respiratory effort normal  Cardiovascular: S1S2 regular, no murmurs  Abdomen: soft, not tender, not distended, positive BS; no liver or spleen organomegaly  Genitourinary: no suprapubic tenderness  Musculoskeletal: no muscle tenderness; left AKA  Neurological/ Psychiatric: nonverbal  Skin: no rashes; 3 cm ulcer over sacrum visible bone, foul smelling brown drainage    Labs: all available labs reviewed                         Labs:                      Labs:                        11.1   13.11 )-----------( 333      ( 22 Feb 2019 05:09 )             35.9     02-22    143  |  109<H>  |  12  ----------------------------<  158<H>  3.4<L>   |  27  |  0.88    Ca    8.4<L>      22 Feb 2019 05:09    TPro  6.7  /  Alb  1.7<L>  /  TBili  0.5  /  DBili  x   /  AST  18  /  ALT  17  /  AlkPhos  102  02-22       Vancomycin Level, Trough: 17.4 ug/mL (02-21 @ 05:39)      Cultures:       Culture - Urine (collected 02-18-19 @ 12:30)  Source: .Urine Catheterized  Final Report (02-19-19 @ 09:47):    <10,000 CFU/ml Normal Urogenital amos present    Culture - Blood (collected 02-18-19 @ 11:03)  Source: .Blood None  Preliminary Report (02-19-19 @ 18:02):    No growth to date.    Culture - Blood (collected 02-18-19 @ 10:19)  Source: .Blood None  Preliminary Report (02-19-19 @ 18:02):    No growth to date.              Radiology: all available radiological tests reviewed    Advanced directives addressed: full resuscitation

## 2019-02-22 NOTE — PROGRESS NOTE ADULT - SUBJECTIVE AND OBJECTIVE BOX
HPI:  Patient is a  84 y/o F with Pmhx of DM Type II on Insulin, PVD s/p Left  BKA, dementia, Hypernatremia, Afib on ASA 81mg, Major depression, HTN who presented to the ED with new onset L facial droop, LUE weakness that started around 8am. Patient arrival HH ED at 0946. Spoke with patient Daughter Deepti as patient unable to provide history. Patient aide noticed at 8am patient with left sided facial dropping, LUE weakness which is new for patient. At baseline, patient wheelchair bound at nursing home facility with advanced dementia, limited communication. Patient daughter states patient has had generalized weakness that she has noticed over the past few weeks. Febrile in ED Tm 101.9.  NIHSS:18  2/19/19 : Pt lethargic, unable to give any information. bilat weakness left UE > Rt. Can not assess     2/20/19 : Pt leaning to left side more, non verbal not following commands, bilat weakness left > Rt more pronounced today, drooling from mouth left side.   2/21/19 : Pt morw alert, follows simple commands , still weak on left Ue with contractures, does not move it to commands, does not follow.   2/22/19 : Pt awake, does not follow commands, no change with left side weakness or UE contracture.    MEDICATIONS  (STANDING):  acetaminophen  Suppository .. 650 milliGRAM(s) Rectal once  ascorbic acid 500 milliGRAM(s) Oral daily  aspirin  chewable 81 milliGRAM(s) Oral daily  atorvastatin 40 milliGRAM(s) Oral at bedtime  calcium carbonate 1250 mG  + Vitamin D (OsCal 500 + D) 1 Tablet(s) Oral two times a day  desmopressin 0.01% Nasal 1 Spray(s) Nasal daily  dextrose 5%. 1000 milliLiter(s) (50 mL/Hr) IV Continuous <Continuous>  dextrose 50% Injectable 12.5 Gram(s) IV Push once  dextrose 50% Injectable 25 Gram(s) IV Push once  dextrose 50% Injectable 25 Gram(s) IV Push once  docusate sodium 100 milliGRAM(s) Oral two times a day  heparin  Injectable 5000 Unit(s) SubCutaneous every 8 hours  insulin glargine Injectable (LANTUS) 25 Unit(s) SubCutaneous at bedtime  insulin lispro (HumaLOG) corrective regimen sliding scale   SubCutaneous three times a day before meals  insulin lispro (HumaLOG) corrective regimen sliding scale   SubCutaneous at bedtime  melatonin 3 milliGRAM(s) Oral at bedtime  memantine 10 milliGRAM(s) Oral two times a day  metoprolol tartrate 12.5 milliGRAM(s) Oral two times a day  mirtazapine 15 milliGRAM(s) Oral at bedtime  piperacillin/tazobactam IVPB. 3.375 Gram(s) IV Intermittent every 8 hours  sodium chloride 0.9%. 1000 milliLiter(s) (75 mL/Hr) IV Continuous <Continuous>  vancomycin  IVPB 750 milliGRAM(s) IV Intermittent every 12 hours  verapamil 80 milliGRAM(s) Oral daily  zinc sulfate 220 milliGRAM(s) Oral daily      Vital Signs Last 24 Hrs  T(C): 37.1 (22 Feb 2019 11:52), Max: 37.1 (21 Feb 2019 19:47)  T(F): 98.8 (22 Feb 2019 11:52), Max: 98.8 (22 Feb 2019 11:52)  HR: 79 (22 Feb 2019 11:52) (79 - 88)  BP: 147/66 (22 Feb 2019 11:52) (132/68 - 158/57)  BP(mean): --  RR: 18 (22 Feb 2019 11:52) (18 - 18)  SpO2: 90% (22 Feb 2019 11:52) (90% - 99%)      Neurological exam:  HF:   drowsy, arousable,  unable to follow simple commands  CN: ILENE, EOMI,  Palate moves equally, SCM equal bilaterally  Motor: RUE lifts antigravity spontaneously, LUE 2/5 increased tone with flexion contracture at elbow,  L BKA, RLE 2/5  Sens: w/d to noxious on R side, LUE  Reflexes: upgoing toes R LE  Coord:  unable to assess  Gait/Balance: Cannot test                          11.1   13.11 )-----------( 333      ( 22 Feb 2019 05:09 )             35.9     02-22    143  |  109<H>  |  12  ----------------------------<  158<H>  3.4<L>   |  27  |  0.88    Ca    8.4<L>      22 Feb 2019 05:09    TPro  6.7  /  Alb  1.7<L>  /  TBili  0.5  /  DBili  x   /  AST  18  /  ALT  17  /  AlkPhos  102  02-22 02-21 [id number] 8.7  02-19 [id number] 8.7    02-19 Chol 196 <H> HDL 32<L> Trig 138    Radiology report:  - CT Head  - CT Head  < from: CT Head No Cont (02.21.19 @ 09:33) >  Impression:  1. no acute findings.          - CT Head  CT Brain Stroke Protocol (02.18.19 @ 09:59)  IMPRESSION:   No acute intracranial hemorrhage, mass effect, or midline shift.

## 2019-02-23 ENCOUNTER — TRANSCRIPTION ENCOUNTER (OUTPATIENT)
Age: 84
End: 2019-02-23

## 2019-02-23 LAB
ALBUMIN SERPL ELPH-MCNC: 1.7 G/DL — LOW (ref 3.3–5)
ALP SERPL-CCNC: 98 U/L — SIGNIFICANT CHANGE UP (ref 40–120)
ALT FLD-CCNC: 16 U/L — SIGNIFICANT CHANGE UP (ref 12–78)
ANION GAP SERPL CALC-SCNC: 5 MMOL/L — SIGNIFICANT CHANGE UP (ref 5–17)
AST SERPL-CCNC: 20 U/L — SIGNIFICANT CHANGE UP (ref 15–37)
BILIRUB SERPL-MCNC: 0.4 MG/DL — SIGNIFICANT CHANGE UP (ref 0.2–1.2)
BUN SERPL-MCNC: 13 MG/DL — SIGNIFICANT CHANGE UP (ref 7–23)
CALCIUM SERPL-MCNC: 8.4 MG/DL — LOW (ref 8.5–10.1)
CHLORIDE SERPL-SCNC: 114 MMOL/L — HIGH (ref 96–108)
CO2 SERPL-SCNC: 29 MMOL/L — SIGNIFICANT CHANGE UP (ref 22–31)
CREAT SERPL-MCNC: 0.88 MG/DL — SIGNIFICANT CHANGE UP (ref 0.5–1.3)
CULTURE RESULTS: SIGNIFICANT CHANGE UP
CULTURE RESULTS: SIGNIFICANT CHANGE UP
GLUCOSE SERPL-MCNC: 121 MG/DL — HIGH (ref 70–99)
HCT VFR BLD CALC: 33.7 % — LOW (ref 34.5–45)
HGB BLD-MCNC: 10.3 G/DL — LOW (ref 11.5–15.5)
MCHC RBC-ENTMCNC: 28.1 PG — SIGNIFICANT CHANGE UP (ref 27–34)
MCHC RBC-ENTMCNC: 30.6 GM/DL — LOW (ref 32–36)
MCV RBC AUTO: 92.1 FL — SIGNIFICANT CHANGE UP (ref 80–100)
NRBC # BLD: 0 /100 WBCS — SIGNIFICANT CHANGE UP (ref 0–0)
PLATELET # BLD AUTO: 316 K/UL — SIGNIFICANT CHANGE UP (ref 150–400)
POTASSIUM SERPL-MCNC: 3.6 MMOL/L — SIGNIFICANT CHANGE UP (ref 3.5–5.3)
POTASSIUM SERPL-SCNC: 3.6 MMOL/L — SIGNIFICANT CHANGE UP (ref 3.5–5.3)
PROT SERPL-MCNC: 6.3 GM/DL — SIGNIFICANT CHANGE UP (ref 6–8.3)
RBC # BLD: 3.66 M/UL — LOW (ref 3.8–5.2)
RBC # FLD: 13.8 % — SIGNIFICANT CHANGE UP (ref 10.3–14.5)
SODIUM SERPL-SCNC: 148 MMOL/L — HIGH (ref 135–145)
SPECIMEN SOURCE: SIGNIFICANT CHANGE UP
SPECIMEN SOURCE: SIGNIFICANT CHANGE UP
WBC # BLD: 9.91 K/UL — SIGNIFICANT CHANGE UP (ref 3.8–10.5)
WBC # FLD AUTO: 9.91 K/UL — SIGNIFICANT CHANGE UP (ref 3.8–10.5)

## 2019-02-23 RX ADMIN — Medication 81 MILLIGRAM(S): at 14:00

## 2019-02-23 RX ADMIN — HEPARIN SODIUM 5000 UNIT(S): 5000 INJECTION INTRAVENOUS; SUBCUTANEOUS at 23:55

## 2019-02-23 RX ADMIN — Medication 250 MILLIGRAM(S): at 09:37

## 2019-02-23 RX ADMIN — Medication 80 MILLIGRAM(S): at 06:36

## 2019-02-23 RX ADMIN — DESMOPRESSIN ACETATE 1 SPRAY(S): 0.1 TABLET ORAL at 13:56

## 2019-02-23 RX ADMIN — Medication 12.5 MILLIGRAM(S): at 06:36

## 2019-02-23 RX ADMIN — PIPERACILLIN AND TAZOBACTAM 25 GRAM(S): 4; .5 INJECTION, POWDER, LYOPHILIZED, FOR SOLUTION INTRAVENOUS at 13:59

## 2019-02-23 RX ADMIN — PIPERACILLIN AND TAZOBACTAM 25 GRAM(S): 4; .5 INJECTION, POWDER, LYOPHILIZED, FOR SOLUTION INTRAVENOUS at 10:52

## 2019-02-23 RX ADMIN — ATORVASTATIN CALCIUM 40 MILLIGRAM(S): 80 TABLET, FILM COATED ORAL at 23:55

## 2019-02-23 RX ADMIN — Medication 250 MILLIGRAM(S): at 18:30

## 2019-02-23 RX ADMIN — Medication 500 MILLIGRAM(S): at 13:59

## 2019-02-23 RX ADMIN — PIPERACILLIN AND TAZOBACTAM 25 GRAM(S): 4; .5 INJECTION, POWDER, LYOPHILIZED, FOR SOLUTION INTRAVENOUS at 23:57

## 2019-02-23 RX ADMIN — HEPARIN SODIUM 5000 UNIT(S): 5000 INJECTION INTRAVENOUS; SUBCUTANEOUS at 14:00

## 2019-02-23 RX ADMIN — Medication 1 TABLET(S): at 18:30

## 2019-02-23 RX ADMIN — Medication 1 TABLET(S): at 06:36

## 2019-02-23 RX ADMIN — MEMANTINE HYDROCHLORIDE 10 MILLIGRAM(S): 10 TABLET ORAL at 06:36

## 2019-02-23 RX ADMIN — MEMANTINE HYDROCHLORIDE 10 MILLIGRAM(S): 10 TABLET ORAL at 18:30

## 2019-02-23 RX ADMIN — Medication 100 MILLIGRAM(S): at 18:28

## 2019-02-23 RX ADMIN — HEPARIN SODIUM 5000 UNIT(S): 5000 INJECTION INTRAVENOUS; SUBCUTANEOUS at 06:43

## 2019-02-23 RX ADMIN — Medication 12.5 MILLIGRAM(S): at 18:30

## 2019-02-23 RX ADMIN — MIRTAZAPINE 15 MILLIGRAM(S): 45 TABLET, ORALLY DISINTEGRATING ORAL at 23:56

## 2019-02-23 RX ADMIN — Medication 3 MILLIGRAM(S): at 23:56

## 2019-02-23 NOTE — PROGRESS NOTE ADULT - SUBJECTIVE AND OBJECTIVE BOX
Patient is a 85y old  Female who presents with a chief complaint of CVA (19 Feb 2019 10:17)    Date of service: 02-23-19 @ 11:45    Patient lying in bed; lethargic but arousable        ROS unable to obtain secondary to patient medical condition     MEDICATIONS  (STANDING):  acetaminophen  Suppository .. 650 milliGRAM(s) Rectal once  ascorbic acid 500 milliGRAM(s) Oral daily  aspirin  chewable 81 milliGRAM(s) Oral daily  atorvastatin 40 milliGRAM(s) Oral at bedtime  calcium carbonate 1250 mG  + Vitamin D (OsCal 500 + D) 1 Tablet(s) Oral two times a day  desmopressin 0.01% Nasal 1 Spray(s) Nasal daily  dextrose 5%. 1000 milliLiter(s) (50 mL/Hr) IV Continuous <Continuous>  dextrose 50% Injectable 12.5 Gram(s) IV Push once  dextrose 50% Injectable 25 Gram(s) IV Push once  dextrose 50% Injectable 25 Gram(s) IV Push once  docusate sodium 100 milliGRAM(s) Oral two times a day  heparin  Injectable 5000 Unit(s) SubCutaneous every 8 hours  insulin glargine Injectable (LANTUS) 25 Unit(s) SubCutaneous at bedtime  insulin lispro (HumaLOG) corrective regimen sliding scale   SubCutaneous three times a day before meals  insulin lispro (HumaLOG) corrective regimen sliding scale   SubCutaneous at bedtime  melatonin 3 milliGRAM(s) Oral at bedtime  memantine 10 milliGRAM(s) Oral two times a day  metoprolol tartrate 12.5 milliGRAM(s) Oral two times a day  mirtazapine 15 milliGRAM(s) Oral at bedtime  piperacillin/tazobactam IVPB. 3.375 Gram(s) IV Intermittent every 8 hours  sodium chloride 0.9%. 1000 milliLiter(s) (75 mL/Hr) IV Continuous <Continuous>  vancomycin  IVPB 750 milliGRAM(s) IV Intermittent every 12 hours  verapamil 80 milliGRAM(s) Oral daily  zinc sulfate 220 milliGRAM(s) Oral daily    MEDICATIONS  (PRN):  acetaminophen   Tablet .. 650 milliGRAM(s) Oral every 6 hours PRN Temp greater or equal to 38C (100.4F), Mild Pain (1 - 3)  dextrose 40% Gel 15 Gram(s) Oral once PRN Blood Glucose LESS THAN 70 milliGRAM(s)/deciliter  glucagon  Injectable 1 milliGRAM(s) IntraMuscular once PRN Glucose LESS THAN 70 milligrams/deciliter      Vital Signs Last 24 Hrs  T(C): 36.7 (23 Feb 2019 11:34), Max: 37.6 (22 Feb 2019 21:41)  T(F): 98.1 (23 Feb 2019 11:34), Max: 99.7 (22 Feb 2019 21:41)  HR: 74 (23 Feb 2019 11:34) (74 - 83)  BP: 164/58 (23 Feb 2019 11:34) (140/40 - 175/58)  BP(mean): --  RR: 18 (23 Feb 2019 11:34) (18 - 18)  SpO2: 95% (23 Feb 2019 11:34) (90% - 100%)    Physical Exam:    PE:    Constitutional: frail looking  HEENT: NC/AT, EOMI, PERRLA, conjunctivae clear; ears and nose atraumatic; pharynx clear  Neck: supple; thyroid not palpable  Back: no tenderness  Respiratory: respiratory effort normal  Cardiovascular: S1S2 regular, no murmurs  Abdomen: soft, not tender, not distended, positive BS; no liver or spleen organomegaly  Genitourinary: no suprapubic tenderness  Musculoskeletal: no muscle tenderness; left AKA  Neurological/ Psychiatric: nonverbal  Skin: no rashes; 3 cm ulcer over sacrum visible bone, foul smelling brown drainage    Labs: all available labs reviewed                         Labs:                      Labs:           Labs:                        10.3   9.91  )-----------( 316      ( 23 Feb 2019 06:05 )             33.7     02-23    148<H>  |  114<H>  |  13  ----------------------------<  121<H>  3.6   |  29  |  0.88    Ca    8.4<L>      23 Feb 2019 06:05    TPro  6.3  /  Alb  1.7<L>  /  TBili  0.4  /  DBili  x   /  AST  20  /  ALT  16  /  AlkPhos  98  02-23           Cultures:       Culture - Urine (collected 02-18-19 @ 12:30)  Source: .Urine Catheterized  Final Report (02-19-19 @ 09:47):    <10,000 CFU/ml Normal Urogenital amos present    Culture - Blood (collected 02-18-19 @ 11:03)  Source: .Blood None  Preliminary Report (02-19-19 @ 18:02):    No growth to date.    Culture - Blood (collected 02-18-19 @ 10:19)  Source: .Blood None  Preliminary Report (02-19-19 @ 18:02):    No growth to date.                Radiology: all available radiological tests reviewed    Advanced directives addressed: full resuscitation

## 2019-02-23 NOTE — DISCHARGE NOTE ADULT - MEDICATION SUMMARY - MEDICATIONS TO STOP TAKING
I will STOP taking the medications listed below when I get home from the hospital:    amoxicillin 500 mg oral capsule  -- 1 tab(s) by mouth 2 times a day x 5 days

## 2019-02-23 NOTE — DISCHARGE NOTE ADULT - HOSPITAL COURSE
ASSESSMENT and PLAN:    85y female w/     1. LUE weakness likely due to acute/subacute CVA  - unable to tolerate MRI  - repeat CT in 48hrs no changes  - aspirin, lipitor  - Cardio f/u appreciated- rec AC for Afib but family uncertain and will let me know 3/4   - -speech and swallow/dysphagia eval appreciated, puree diet    -fall px, aspiration px  -BP control    2. sacral decubitus ulcer, sacral Osteomyelitis  - MRI +  - vanco, zosyn renally dose    - ID f/u appreciated - d/c vanco 3/4, will continue iv abx, likely d/c w/ picc   - Surgical f/u appreciated- no intervention.  Local care.     3/6- family does not want iv picc/abx--> change to po       3. afib  - BB, CCb  - Cardio f/u appreciated- rec AC and family will decide by 3/4  start xarelto    4. PAD s/p left aka  - aspirin, statin    5. dementia, acute on chronic metabolic encephalopathy  - tx above   - supportive care    6.  T2D, hypoglycemia  Hemoglobin A1C, Whole Blood: 8.7: Method: Immunoassay  - stop lantus b/c epidoses hypoglycemia early am and pt not eating much  - Endo f/u appreciated  - ISS, fs  3/2-glucose elevated this morning, will adjust meds    3/3- glucose improving    7. hypernatremia, hx chronic DI undetermined etiology as per 2017 notes and pt on desmopressin   hypokalemia, SYLVIA on CKD3  3/6- comfort focus- no further labs     dvt px  heparin sc

## 2019-02-23 NOTE — PROGRESS NOTE ADULT - SUBJECTIVE AND OBJECTIVE BOX
Cardiology Progress Note    HPI: 85F with hx of DM Type II on Insulin, PVD s/p Left  AKA, dementia (non-verbal at baseline), Afib (not on AC), Major depression, HTN sent from [facility] for unresponsiveness and left sided weakness with left facial droop. Stroke code called, but patient noted to be febrile, tPA not given, d/w Daughter per Neurology note. Patient unable to provide any hx secondary to dementia and non-verbal status. Alert in ED, appears comfortable. (2019 17:41)    . Confused, unable to answer questions due to dementia.   PAF on tele.     . Confused, lethargic. Case d/w family at bedside. SR on tele.     . No events last pm. No CP. Resting comfortably.     . No events last pm. No CP. Confused/lethargic.     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 650 milliGRAM(s) Rectal once  ascorbic acid 500 milliGRAM(s) Oral daily  aspirin  chewable 81 milliGRAM(s) Oral daily  atorvastatin 40 milliGRAM(s) Oral at bedtime  calcium carbonate 1250 mG  + Vitamin D (OsCal 500 + D) 1 Tablet(s) Oral two times a day  desmopressin 0.01% Nasal 1 Spray(s) Nasal daily  dextrose 5%. 1000 milliLiter(s) (50 mL/Hr) IV Continuous <Continuous>  dextrose 50% Injectable 12.5 Gram(s) IV Push once  dextrose 50% Injectable 25 Gram(s) IV Push once  dextrose 50% Injectable 25 Gram(s) IV Push once  docusate sodium 100 milliGRAM(s) Oral two times a day  heparin  Injectable 5000 Unit(s) SubCutaneous every 8 hours  insulin glargine Injectable (LANTUS) 45 Unit(s) SubCutaneous at bedtime  insulin lispro (HumaLOG) corrective regimen sliding scale   SubCutaneous three times a day before meals  insulin lispro (HumaLOG) corrective regimen sliding scale   SubCutaneous at bedtime  melatonin 3 milliGRAM(s) Oral at bedtime  memantine 10 milliGRAM(s) Oral two times a day  metoprolol tartrate 12.5 milliGRAM(s) Oral two times a day  mirtazapine 15 milliGRAM(s) Oral at bedtime  piperacillin/tazobactam IVPB. 3.375 Gram(s) IV Intermittent every 8 hours  sodium chloride 0.9%. 1000 milliLiter(s) (75 mL/Hr) IV Continuous <Continuous>  verapamil 80 milliGRAM(s) Oral daily  zinc sulfate 220 milliGRAM(s) Oral daily    MEDICATIONS  (PRN):  acetaminophen   Tablet .. 650 milliGRAM(s) Oral every 6 hours PRN Temp greater or equal to 38C (100.4F), Mild Pain (1 - 3)  dextrose 40% Gel 15 Gram(s) Oral once PRN Blood Glucose LESS THAN 70 milliGRAM(s)/deciliter  glucagon  Injectable 1 milliGRAM(s) IntraMuscular once PRN Glucose LESS THAN 70 milligrams/deciliter    Vital Signs Last 24 Hrs  T(C): 37 (2019 06:48), Max: 38.8 (2019 09:50)  T(F): 98.6 (2019 06:48), Max: 101.9 (2019 09:50)  HR: 84 (2019 06:48) (70 - 86)  BP: 152/97 (2019 06:48) (122/57 - 160/48)  BP(mean): --  RR: 17 (2019 06:48) (14 - 21)  SpO2: 97% (2019 06:48) (95% - 100%)    REVIEW OF SYSTEMS:    CONSTITUTIONAL:  As per HPI.  HEENT:  Eyes:  No diplopia or blurred vision. ENT:  No earache, sore throat or runny nose.  CARDIOVASCULAR:  No pressure, squeezing, strangling, tightness, heaviness or aching about the chest, neck, axilla or epigastrium.  RESPIRATORY:  No cough, shortness of breath, PND or orthopnea.  GASTROINTESTINAL:  No nausea, vomiting or diarrhea.  GENITOURINARY:  No dysuria, frequency or urgency.  MUSCULOSKELETAL:  As per HPI.  SKIN:  No change in skin, hair or nails.  NEUROLOGIC:  No paresthesias, fasciculations, seizures or weakness.    PHYSICAL EXAMINATION:    GENERAL APPEARANCE: Confused, mildly lethargic.   HEENT:  Pupils are normal and react normally. No icterus. Mucous membranes well colored.  NECK:  Supple. No lymphadenopathy. Jugular venous pressure not elevated. Carotids equal.   HEART:   The cardiac impulse has a normal quality. There are no murmurs, rubs or gallops noted  CHEST:  Chest is clear to auscultation. Normal respiratory effort.  ABDOMEN:  Soft and nontender.   EXTREMITIES:  There is no edema.     I&O's Summary      LABS:                        11.7   15.23 )-----------( 309      ( 2019 10:19 )             37.9         145  |  115<H>  |  14  ----------------------------<  204<H>  3.8   |  25  |  0.68    Ca    7.8<L>      2019 18:32    TPro  7.4  /  Alb  1.8<L>  /  TBili  0.4  /  DBili  x   /  AST  29  /  ALT  21  /  AlkPhos  125<H>      LIVER FUNCTIONS - ( 2019 10:19 )  Alb: 1.8 g/dL / Pro: 7.4 gm/dL / ALK PHOS: 125 U/L / ALT: 21 U/L / AST: 29 U/L / GGT: x           PT/INR - ( 2019 10:19 )   PT: 15.2 sec;   INR: 1.36 ratio       PTT - ( 2019 10:19 )  PTT:26.4 sec    Urinalysis Basic - ( 2019 12:30 )    Color: Yellow / Appearance: Slightly Turbid / S.020 / pH: x  Gluc: x / Ketone: Trace  / Bili: Negative / Urobili: Negative mg/dL   Blood: x / Protein: 100 mg/dL / Nitrite: Negative   Leuk Esterase: Small / RBC: 3-5 /HPF / WBC 6-10   Sq Epi: x / Non Sq Epi: Few / Bacteria: Many    EKG: < from: 12 Lead ECG (19 @ 10:18) >   Atrial fibrillation  Prolonged QT  Abnormal ECG    TELEMETRY: SR    CARDIAC TESTS: < from: Transthoracic Echocardiogram (19 @ 09:46) >  Technically limited study.   The LV endocardium is poorly seen. Global LV systolic function is normal   The left ventricle is normal in size.   Estimated left ventricular ejection fraction is 50- 55 %.   Normal appearing left atrium.   Normal appearing right atrium.   Normal appearing right ventricle structure and function.   The aortic valve is not well visualized, appears normal. Valve opening   seems to be normal.   Mild mitral annular calcification is present.   Fibrocalcific changes noted to the mitral valve leaflets with preserved   leaflet excursion.   Moderate (2+) mitral regurgitation is present.   Trace tricuspid valve regurgitation is present.   No evidence of pericardial effusion.    RADIOLOGY & ADDITIONAL STUDIES: < from: CT Brain Stroke Protocol (19 @ 09:59) >  No acute intracranial hemorrhage, mass effect, or midline shift.       ASSESSMENT & PLAN:

## 2019-02-23 NOTE — DISCHARGE NOTE ADULT - CARE PLAN
Principal Discharge DX:	Weakness  Goal:	comfort  Assessment and plan of treatment:	continue meds as above   maintain comfort  Secondary Diagnosis:	Osteomyelitis  Goal:	comfort  Assessment and plan of treatment:	complete antibiotics

## 2019-02-23 NOTE — DISCHARGE NOTE ADULT - PATIENT PORTAL LINK FT
You can access the [facility] Patient Portal, offered by [facility], by registering with the following website: [email or web address]

## 2019-02-23 NOTE — DISCHARGE NOTE ADULT - MEDICATION SUMMARY - MEDICATIONS TO TAKE
I will START or STAY ON the medications listed below when I get home from the hospital:    aspirin 81 mg oral tablet, chewable  -- 1 tab(s) by mouth once a day  -- Indication: For Home med    verapamil 80 mg oral tablet  -- 1 tab(s) by mouth once a day  -- Indication: For Home med    rivaroxaban 15 mg oral tablet  -- 1 tab(s) by mouth every 24 hours  -- Indication: For Afib, cva prevention    mirtazapine 15 mg oral tablet  -- 1 tab(s) by mouth once a day (at bedtime)  -- Indication: For Home med    insulin glargine  -- Indication: For Home med    desmopressin nasal  -- 10 microgram(s) in each nostril once a day  -- repeat sodium level in 1-3days  -- Indication: For Home med    Lopressor  -- 12.5 milligram(s) by mouth 2 times a day  -- Indication: For Home med    melatonin 3 mg oral tablet  -- 1 tab(s) by mouth once (at bedtime)  -- Indication: For Home med I will START or STAY ON the medications listed below when I get home from the hospital:    aspirin 81 mg oral tablet, chewable  -- 1 tab(s) by mouth once a day  -- Indication: For Home med    verapamil 80 mg oral tablet  -- 1 tab(s) by mouth once a day  -- Indication: For Home med    rivaroxaban 15 mg oral tablet  -- 1 tab(s) by mouth every 24 hours  -- Indication: For Afib, cva prevention    mirtazapine 15 mg oral tablet  -- 1 tab(s) by mouth once a day (at bedtime)  -- Indication: For Home med    insulin glargine  -- Indication: For Home med    desmopressin nasal  -- 10 microgram(s) in each nostril once a day  -- repeat sodium level in 1-3days  -- Indication: For Home med    doxycycline monohydrate 100 mg oral capsule  -- 1 cap(s) by mouth every 12 hours  -- Indication: For sacral decub/OM    Lopressor  -- 12.5 milligram(s) by mouth 2 times a day  -- Indication: For Home med    melatonin 3 mg oral tablet  -- 1 tab(s) by mouth once (at bedtime)  -- Indication: For Home med

## 2019-02-23 NOTE — PROGRESS NOTE ADULT - SUBJECTIVE AND OBJECTIVE BOX
CHIEF COMPLAINT/diagnosis: sacral decubitus ulcer/ possible osteomyelitis    SUBJECTIVE: patient lethargic, barely arousable    REVIEW OF SYSTEMS:    not attainable secondary to lethargy    Vital Signs Last 24 Hrs  T(C): 36.7 (23 Feb 2019 11:34), Max: 37.6 (22 Feb 2019 21:41)  T(F): 98.1 (23 Feb 2019 11:34), Max: 99.7 (22 Feb 2019 21:41)  HR: 74 (23 Feb 2019 11:34) (74 - 83)  BP: 164/58 (23 Feb 2019 11:34) (140/40 - 175/58)  BP(mean): --  RR: 18 (23 Feb 2019 11:34) (18 - 18)  SpO2: 95% (23 Feb 2019 11:34) (95% - 100%)    I&O's Summary      CAPILLARY BLOOD GLUCOSE      POCT Blood Glucose.: 94 mg/dL (23 Feb 2019 13:06)  POCT Blood Glucose.: 87 mg/dL (23 Feb 2019 09:35)  POCT Blood Glucose.: 157 mg/dL (22 Feb 2019 21:44)  POCT Blood Glucose.: 107 mg/dL (22 Feb 2019 17:13)      PHYSICAL EXAM:    Constitutional: NAD, awake and alert, well-developed  HEENT: PERR, EOMI, Normal Hearing, MMM  Neck: Soft and supple, No LAD, No JVD  Respiratory: Breath sounds are clear bilaterally, No wheezing, rales or rhonchi  Cardiovascular: S1 and S2, regular rate and rhythm, no Murmurs, gallops or rubs  Gastrointestinal: Bowel Sounds present, soft, nontender, nondistended, no guarding, no rebound  Extremities: No peripheral edema  Vascular: 2+ peripheral pulses  Neurological: , no focal deficits  Musculoskeletal: 5/5 strength b/l upper and lower extremities  Skin: No rashes    MEDICATIONS:  MEDICATIONS  (STANDING):  acetaminophen  Suppository .. 650 milliGRAM(s) Rectal once  ascorbic acid 500 milliGRAM(s) Oral daily  aspirin  chewable 81 milliGRAM(s) Oral daily  atorvastatin 40 milliGRAM(s) Oral at bedtime  calcium carbonate 1250 mG  + Vitamin D (OsCal 500 + D) 1 Tablet(s) Oral two times a day  desmopressin 0.01% Nasal 1 Spray(s) Nasal daily  dextrose 5%. 1000 milliLiter(s) (50 mL/Hr) IV Continuous <Continuous>  dextrose 50% Injectable 12.5 Gram(s) IV Push once  dextrose 50% Injectable 25 Gram(s) IV Push once  dextrose 50% Injectable 25 Gram(s) IV Push once  docusate sodium 100 milliGRAM(s) Oral two times a day  heparin  Injectable 5000 Unit(s) SubCutaneous every 8 hours  insulin glargine Injectable (LANTUS) 25 Unit(s) SubCutaneous at bedtime  insulin lispro (HumaLOG) corrective regimen sliding scale   SubCutaneous three times a day before meals  insulin lispro (HumaLOG) corrective regimen sliding scale   SubCutaneous at bedtime  melatonin 3 milliGRAM(s) Oral at bedtime  memantine 10 milliGRAM(s) Oral two times a day  metoprolol tartrate 12.5 milliGRAM(s) Oral two times a day  mirtazapine 15 milliGRAM(s) Oral at bedtime  piperacillin/tazobactam IVPB. 3.375 Gram(s) IV Intermittent every 8 hours  sodium chloride 0.9%. 1000 milliLiter(s) (75 mL/Hr) IV Continuous <Continuous>  vancomycin  IVPB 750 milliGRAM(s) IV Intermittent every 12 hours  verapamil 80 milliGRAM(s) Oral daily  zinc sulfate 220 milliGRAM(s) Oral daily      LABS: All Labs Reviewed:                        10.3   9.91  )-----------( 316      ( 23 Feb 2019 06:05 )             33.7     02-23    148<H>  |  114<H>  |  13  ----------------------------<  121<H>  3.6   |  29  |  0.88    Ca    8.4<L>      23 Feb 2019 06:05    TPro  6.3  /  Alb  1.7<L>  /  TBili  0.4  /  DBili  x   /  AST  20  /  ALT  16  /  AlkPhos  98  02-23            Assessment and Plan:     85F with acute CVA and fever    # Acute/Subacute CVA: with some LUE weakness  # Hx of Left AKA  - Patient was unable to tolerate MRI of the head/neck, Hence repeat CT of head after 48 hours did not show any changes  - ASA, Lipitor  -Cardiology eval appreciated, high CHADS score for afib, should be AC, d/w  who discussed with family and they want to wait for AC at this time  -speech and swallow/dysphagia eval appreciated, puree diet  -fall px, aspiration px  -BP control    * Osteomyelitis of the sacral area  -vanco and zosyn  -ID Cx appreciated, d/w   -f/u all cultures, blood/urine  - MRI of Pelvis with OM  -tylenol prn fever    *AFib, not on AC  - discussed with daughter in detail over the phone and family in the room, r/b of AC, they want to still think about it.   - c/w metoprolol, verapamil  - rate controlled  - f/u cardio    *PVD s/p left AKA  -fall px  -c/w ASA    *HTN:  -c/w metoprolol, verapamil    *Dementia  -supportive care  -aspiration/fall px    *Depression  -mood appears stable    *DVT px:  -Heparin sq    * Hypoglycemia: ? even on D5  - lantus dose reduced to 25 units  - endo evaluation appreciated.     Discusssed in length about treating Osteomyelitis;  antibiotics with comfort care VS antibiotis/debridement/wound Vac/colostomy to prevent recurrent infection/muscle flap.   Given her age and condition, surgical option is high risk and may not even be a candidate  Family to discuss among themselves and offer a decision.  Will place palliative consult as family having difficult time with decision making despite hospitalist discussion

## 2019-02-24 RX ADMIN — Medication 1 TABLET(S): at 19:01

## 2019-02-24 RX ADMIN — Medication 12.5 MILLIGRAM(S): at 19:03

## 2019-02-24 RX ADMIN — INSULIN GLARGINE 25 UNIT(S): 100 INJECTION, SOLUTION SUBCUTANEOUS at 22:00

## 2019-02-24 RX ADMIN — MIRTAZAPINE 15 MILLIGRAM(S): 45 TABLET, ORALLY DISINTEGRATING ORAL at 22:01

## 2019-02-24 RX ADMIN — HEPARIN SODIUM 5000 UNIT(S): 5000 INJECTION INTRAVENOUS; SUBCUTANEOUS at 13:46

## 2019-02-24 RX ADMIN — Medication 1 TABLET(S): at 06:17

## 2019-02-24 RX ADMIN — Medication 12.5 MILLIGRAM(S): at 06:17

## 2019-02-24 RX ADMIN — ATORVASTATIN CALCIUM 40 MILLIGRAM(S): 80 TABLET, FILM COATED ORAL at 22:01

## 2019-02-24 RX ADMIN — Medication 250 MILLIGRAM(S): at 06:17

## 2019-02-24 RX ADMIN — Medication 500 MILLIGRAM(S): at 11:33

## 2019-02-24 RX ADMIN — PIPERACILLIN AND TAZOBACTAM 25 GRAM(S): 4; .5 INJECTION, POWDER, LYOPHILIZED, FOR SOLUTION INTRAVENOUS at 06:21

## 2019-02-24 RX ADMIN — Medication 80 MILLIGRAM(S): at 06:17

## 2019-02-24 RX ADMIN — Medication 3 MILLIGRAM(S): at 22:01

## 2019-02-24 RX ADMIN — MEMANTINE HYDROCHLORIDE 10 MILLIGRAM(S): 10 TABLET ORAL at 19:01

## 2019-02-24 RX ADMIN — PIPERACILLIN AND TAZOBACTAM 25 GRAM(S): 4; .5 INJECTION, POWDER, LYOPHILIZED, FOR SOLUTION INTRAVENOUS at 22:00

## 2019-02-24 RX ADMIN — Medication 81 MILLIGRAM(S): at 11:33

## 2019-02-24 RX ADMIN — ZINC SULFATE TAB 220 MG (50 MG ZINC EQUIVALENT) 220 MILLIGRAM(S): 220 (50 ZN) TAB at 13:45

## 2019-02-24 RX ADMIN — HEPARIN SODIUM 5000 UNIT(S): 5000 INJECTION INTRAVENOUS; SUBCUTANEOUS at 06:20

## 2019-02-24 RX ADMIN — HEPARIN SODIUM 5000 UNIT(S): 5000 INJECTION INTRAVENOUS; SUBCUTANEOUS at 22:00

## 2019-02-24 RX ADMIN — PIPERACILLIN AND TAZOBACTAM 25 GRAM(S): 4; .5 INJECTION, POWDER, LYOPHILIZED, FOR SOLUTION INTRAVENOUS at 13:48

## 2019-02-24 RX ADMIN — MEMANTINE HYDROCHLORIDE 10 MILLIGRAM(S): 10 TABLET ORAL at 06:17

## 2019-02-24 RX ADMIN — Medication 250 MILLIGRAM(S): at 19:00

## 2019-02-24 NOTE — PROGRESS NOTE ADULT - SUBJECTIVE AND OBJECTIVE BOX
Patient is a 85y old  Female who presents with a chief complaint of CVA (19 Feb 2019 10:17)        Date of service: 02-24-19 @ 10:34      Patient more alert today; awake, nonverbal      ROS unable to obtain secondary to patient medical condition     MEDICATIONS  (STANDING):  acetaminophen  Suppository .. 650 milliGRAM(s) Rectal once  ascorbic acid 500 milliGRAM(s) Oral daily  aspirin  chewable 81 milliGRAM(s) Oral daily  atorvastatin 40 milliGRAM(s) Oral at bedtime  calcium carbonate 1250 mG  + Vitamin D (OsCal 500 + D) 1 Tablet(s) Oral two times a day  desmopressin 0.01% Nasal 1 Spray(s) Nasal daily  dextrose 5%. 1000 milliLiter(s) (50 mL/Hr) IV Continuous <Continuous>  dextrose 50% Injectable 12.5 Gram(s) IV Push once  dextrose 50% Injectable 25 Gram(s) IV Push once  dextrose 50% Injectable 25 Gram(s) IV Push once  docusate sodium 100 milliGRAM(s) Oral two times a day  heparin  Injectable 5000 Unit(s) SubCutaneous every 8 hours  insulin glargine Injectable (LANTUS) 25 Unit(s) SubCutaneous at bedtime  insulin lispro (HumaLOG) corrective regimen sliding scale   SubCutaneous three times a day before meals  insulin lispro (HumaLOG) corrective regimen sliding scale   SubCutaneous at bedtime  melatonin 3 milliGRAM(s) Oral at bedtime  memantine 10 milliGRAM(s) Oral two times a day  metoprolol tartrate 12.5 milliGRAM(s) Oral two times a day  mirtazapine 15 milliGRAM(s) Oral at bedtime  piperacillin/tazobactam IVPB. 3.375 Gram(s) IV Intermittent every 8 hours  sodium chloride 0.9%. 1000 milliLiter(s) (75 mL/Hr) IV Continuous <Continuous>  vancomycin  IVPB 750 milliGRAM(s) IV Intermittent every 12 hours  verapamil 80 milliGRAM(s) Oral daily  zinc sulfate 220 milliGRAM(s) Oral daily    MEDICATIONS  (PRN):  acetaminophen   Tablet .. 650 milliGRAM(s) Oral every 6 hours PRN Temp greater or equal to 38C (100.4F), Mild Pain (1 - 3)  dextrose 40% Gel 15 Gram(s) Oral once PRN Blood Glucose LESS THAN 70 milliGRAM(s)/deciliter  glucagon  Injectable 1 milliGRAM(s) IntraMuscular once PRN Glucose LESS THAN 70 milligrams/deciliter      Vital Signs Last 24 Hrs  T(C): 36.7 (24 Feb 2019 04:46), Max: 37.3 (23 Feb 2019 22:25)  T(F): 98 (24 Feb 2019 04:46), Max: 99.2 (23 Feb 2019 22:25)  HR: 62 (24 Feb 2019 04:46) (62 - 74)  BP: 151/68 (24 Feb 2019 04:46) (143/77 - 164/58)  BP(mean): --  RR: 18 (24 Feb 2019 04:46) (17 - 18)  SpO2: 98% (24 Feb 2019 04:46) (95% - 98%)    Physical Exam:    PE:    Constitutional: frail looking  HEENT: NC/AT, EOMI, PERRLA, conjunctivae clear; ears and nose atraumatic; pharynx clear  Neck: supple; thyroid not palpable  Back: no tenderness  Respiratory: respiratory effort normal  Cardiovascular: S1S2 regular, no murmurs  Abdomen: soft, not tender, not distended, positive BS; no liver or spleen organomegaly  Genitourinary: no suprapubic tenderness  Musculoskeletal: no muscle tenderness; left AKA  Neurological/ Psychiatric: nonverbal  Skin: no rashes; 3 cm ulcer over sacrum visible bone, foul smelling brown drainage    Labs: all available labs reviewed                         Labs:                      Labs:          Labs:                        10.3   9.91  )-----------( 316      ( 23 Feb 2019 06:05 )             33.7     02-23    148<H>  |  114<H>  |  13  ----------------------------<  121<H>  3.6   |  29  |  0.88    Ca    8.4<L>      23 Feb 2019 06:05    TPro  6.3  /  Alb  1.7<L>  /  TBili  0.4  /  DBili  x   /  AST  20  /  ALT  16  /  AlkPhos  98  02-23           Cultures:       Culture - Urine (collected 02-18-19 @ 12:30)  Source: .Urine Catheterized  Final Report (02-19-19 @ 09:47):    <10,000 CFU/ml Normal Urogenital amos present    Culture - Blood (collected 02-18-19 @ 11:03)  Source: .Blood None  Final Report (02-23-19 @ 18:01):    No growth at 5 days.    Culture - Blood (collected 02-18-19 @ 10:19)  Source: .Blood None  Final Report (02-23-19 @ 18:01):    No growth at 5 days.                Radiology: all available radiological tests reviewed    Advanced directives addressed: full resuscitation

## 2019-02-24 NOTE — PROGRESS NOTE ADULT - SUBJECTIVE AND OBJECTIVE BOX
Cardiology Progress Note    HPI: 85F with hx of DM Type II on Insulin, PVD s/p Left  AKA, dementia (non-verbal at baseline), Afib (not on AC), Major depression, HTN sent from [facility] for unresponsiveness and left sided weakness with left facial droop. Stroke code called, but patient noted to be febrile, tPA not given, d/w Daughter per Neurology note. Patient unable to provide any hx secondary to dementia and non-verbal status. Alert in ED, appears comfortable. (2019 17:41)    . Confused, unable to answer questions due to dementia.   PAF on tele.     . Confused, lethargic. Case d/w family at bedside. SR on tele.     . No events last pm. No CP. Resting comfortably.     . No events last pm. No CP. Confused/lethargic.     . No events last pm. On IV abx. Remains lethargic.     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 650 milliGRAM(s) Rectal once  ascorbic acid 500 milliGRAM(s) Oral daily  aspirin  chewable 81 milliGRAM(s) Oral daily  atorvastatin 40 milliGRAM(s) Oral at bedtime  calcium carbonate 1250 mG  + Vitamin D (OsCal 500 + D) 1 Tablet(s) Oral two times a day  desmopressin 0.01% Nasal 1 Spray(s) Nasal daily  dextrose 5%. 1000 milliLiter(s) (50 mL/Hr) IV Continuous <Continuous>  dextrose 50% Injectable 12.5 Gram(s) IV Push once  dextrose 50% Injectable 25 Gram(s) IV Push once  dextrose 50% Injectable 25 Gram(s) IV Push once  docusate sodium 100 milliGRAM(s) Oral two times a day  heparin  Injectable 5000 Unit(s) SubCutaneous every 8 hours  insulin glargine Injectable (LANTUS) 45 Unit(s) SubCutaneous at bedtime  insulin lispro (HumaLOG) corrective regimen sliding scale   SubCutaneous three times a day before meals  insulin lispro (HumaLOG) corrective regimen sliding scale   SubCutaneous at bedtime  melatonin 3 milliGRAM(s) Oral at bedtime  memantine 10 milliGRAM(s) Oral two times a day  metoprolol tartrate 12.5 milliGRAM(s) Oral two times a day  mirtazapine 15 milliGRAM(s) Oral at bedtime  piperacillin/tazobactam IVPB. 3.375 Gram(s) IV Intermittent every 8 hours  sodium chloride 0.9%. 1000 milliLiter(s) (75 mL/Hr) IV Continuous <Continuous>  verapamil 80 milliGRAM(s) Oral daily  zinc sulfate 220 milliGRAM(s) Oral daily    MEDICATIONS  (PRN):  acetaminophen   Tablet .. 650 milliGRAM(s) Oral every 6 hours PRN Temp greater or equal to 38C (100.4F), Mild Pain (1 - 3)  dextrose 40% Gel 15 Gram(s) Oral once PRN Blood Glucose LESS THAN 70 milliGRAM(s)/deciliter  glucagon  Injectable 1 milliGRAM(s) IntraMuscular once PRN Glucose LESS THAN 70 milligrams/deciliter    Vital Signs Last 24 Hrs  T(C): 37 (2019 06:48), Max: 38.8 (2019 09:50)  T(F): 98.6 (2019 06:48), Max: 101.9 (2019 09:50)  HR: 84 (2019 06:48) (70 - 86)  BP: 152/97 (2019 06:48) (122/57 - 160/48)  BP(mean): --  RR: 17 (2019 06:48) (14 - 21)  SpO2: 97% (2019 06:48) (95% - 100%)    REVIEW OF SYSTEMS:    CONSTITUTIONAL:  As per HPI.  HEENT:  Eyes:  No diplopia or blurred vision. ENT:  No earache, sore throat or runny nose.  CARDIOVASCULAR:  No pressure, squeezing, strangling, tightness, heaviness or aching about the chest, neck, axilla or epigastrium.  RESPIRATORY:  No cough, shortness of breath, PND or orthopnea.  GASTROINTESTINAL:  No nausea, vomiting or diarrhea.  GENITOURINARY:  No dysuria, frequency or urgency.  MUSCULOSKELETAL:  As per HPI.  SKIN:  No change in skin, hair or nails.  NEUROLOGIC:  No paresthesias, fasciculations, seizures or weakness.    PHYSICAL EXAMINATION:    GENERAL APPEARANCE: Confused, mildly lethargic.   HEENT:  Pupils are normal and react normally. No icterus. Mucous membranes well colored.  NECK:  Supple. No lymphadenopathy. Jugular venous pressure not elevated. Carotids equal.   HEART:   The cardiac impulse has a normal quality. There are no murmurs, rubs or gallops noted  CHEST:  Chest is clear to auscultation. Normal respiratory effort.  ABDOMEN:  Soft and nontender.   EXTREMITIES:  There is no edema.     I&O's Summary      LABS:                        11.7   15.23 )-----------( 309      ( 2019 10:19 )             37.9         145  |  115<H>  |  14  ----------------------------<  204<H>  3.8   |  25  |  0.68    Ca    7.8<L>      2019 18:32    TPro  7.4  /  Alb  1.8<L>  /  TBili  0.4  /  DBili  x   /  AST  29  /  ALT  21  /  AlkPhos  125<H>      LIVER FUNCTIONS - ( 2019 10:19 )  Alb: 1.8 g/dL / Pro: 7.4 gm/dL / ALK PHOS: 125 U/L / ALT: 21 U/L / AST: 29 U/L / GGT: x           PT/INR - ( 2019 10:19 )   PT: 15.2 sec;   INR: 1.36 ratio       PTT - ( 2019 10:19 )  PTT:26.4 sec    Urinalysis Basic - ( 2019 12:30 )    Color: Yellow / Appearance: Slightly Turbid / S.020 / pH: x  Gluc: x / Ketone: Trace  / Bili: Negative / Urobili: Negative mg/dL   Blood: x / Protein: 100 mg/dL / Nitrite: Negative   Leuk Esterase: Small / RBC: 3-5 /HPF / WBC 6-10   Sq Epi: x / Non Sq Epi: Few / Bacteria: Many    EKG: < from: 12 Lead ECG (19 @ 10:18) >   Atrial fibrillation  Prolonged QT  Abnormal ECG    TELEMETRY: SR    CARDIAC TESTS: < from: Transthoracic Echocardiogram (19 @ 09:46) >  Technically limited study.   The LV endocardium is poorly seen. Global LV systolic function is normal   The left ventricle is normal in size.   Estimated left ventricular ejection fraction is 50- 55 %.   Normal appearing left atrium.   Normal appearing right atrium.   Normal appearing right ventricle structure and function.   The aortic valve is not well visualized, appears normal. Valve opening   seems to be normal.   Mild mitral annular calcification is present.   Fibrocalcific changes noted to the mitral valve leaflets with preserved   leaflet excursion.   Moderate (2+) mitral regurgitation is present.   Trace tricuspid valve regurgitation is present.   No evidence of pericardial effusion.    RADIOLOGY & ADDITIONAL STUDIES: < from: CT Brain Stroke Protocol (19 @ 09:59) >  No acute intracranial hemorrhage, mass effect, or midline shift.       ASSESSMENT & PLAN:

## 2019-02-24 NOTE — PROGRESS NOTE ADULT - SUBJECTIVE AND OBJECTIVE BOX
CHIEF COMPLAINT/Diagnosis: dementia/ LLE amputee/ sacral decubitus/ likley osteomyelitis    SUBJECTIVE:  sacral decubitus ulcer seen at bedside with nursing. oozing yellow drainage with dead tissue sloughing off near the spine. Unstageable, able to see the bone.    REVIEW OF SYSTEMS:  unattainable secondary to dementia    Vital Signs Last 24 Hrs  T(C): 37.1 (24 Feb 2019 11:40), Max: 37.3 (23 Feb 2019 22:25)  T(F): 98.8 (24 Feb 2019 11:40), Max: 99.2 (23 Feb 2019 22:25)  HR: 75 (24 Feb 2019 11:40) (62 - 75)  BP: 154/62 (24 Feb 2019 11:40) (143/77 - 154/62)  BP(mean): --  RR: 18 (24 Feb 2019 11:40) (17 - 18)  SpO2: 97% (24 Feb 2019 11:40) (97% - 98%)    I&O's Summary      CAPILLARY BLOOD GLUCOSE      POCT Blood Glucose.: 141 mg/dL (24 Feb 2019 12:43)  POCT Blood Glucose.: 112 mg/dL (24 Feb 2019 08:17)  POCT Blood Glucose.: 83 mg/dL (23 Feb 2019 23:52)  POCT Blood Glucose.: 107 mg/dL (23 Feb 2019 18:42)      PHYSICAL EXAM:    Constitutional: NAD, awake and alert, well-developed  HEENT: PERR, EOMI, Normal Hearing, MMM  Neck: Soft and supple, No LAD, No JVD  Respiratory: Breath sounds are clear bilaterally, No wheezing, rales or rhonchi  Cardiovascular: S1 and S2, regular rate and rhythm, no Murmurs, gallops or rubs  Gastrointestinal: Bowel Sounds present, soft, nontender, nondistended, no guarding, no rebound  Extremities: No peripheral edema  Vascular: 2+ peripheral pulses  Neurological: alert but not oriented.  no focal deficits  Musculoskeletal: 5/5 strength b/l upper and lower extremities  Skin: No rashes    MEDICATIONS:  MEDICATIONS  (STANDING):  acetaminophen  Suppository .. 650 milliGRAM(s) Rectal once  ascorbic acid 500 milliGRAM(s) Oral daily  aspirin  chewable 81 milliGRAM(s) Oral daily  atorvastatin 40 milliGRAM(s) Oral at bedtime  calcium carbonate 1250 mG  + Vitamin D (OsCal 500 + D) 1 Tablet(s) Oral two times a day  desmopressin 0.01% Nasal 1 Spray(s) Nasal daily  dextrose 5%. 1000 milliLiter(s) (50 mL/Hr) IV Continuous <Continuous>  dextrose 50% Injectable 12.5 Gram(s) IV Push once  dextrose 50% Injectable 25 Gram(s) IV Push once  dextrose 50% Injectable 25 Gram(s) IV Push once  docusate sodium 100 milliGRAM(s) Oral two times a day  heparin  Injectable 5000 Unit(s) SubCutaneous every 8 hours  insulin glargine Injectable (LANTUS) 25 Unit(s) SubCutaneous at bedtime  insulin lispro (HumaLOG) corrective regimen sliding scale   SubCutaneous three times a day before meals  insulin lispro (HumaLOG) corrective regimen sliding scale   SubCutaneous at bedtime  melatonin 3 milliGRAM(s) Oral at bedtime  memantine 10 milliGRAM(s) Oral two times a day  metoprolol tartrate 12.5 milliGRAM(s) Oral two times a day  mirtazapine 15 milliGRAM(s) Oral at bedtime  piperacillin/tazobactam IVPB. 3.375 Gram(s) IV Intermittent every 8 hours  sodium chloride 0.9%. 1000 milliLiter(s) (75 mL/Hr) IV Continuous <Continuous>  vancomycin  IVPB 750 milliGRAM(s) IV Intermittent every 12 hours  verapamil 80 milliGRAM(s) Oral daily  zinc sulfate 220 milliGRAM(s) Oral daily      LABS: All Labs Reviewed:                        10.3   9.91  )-----------( 316      ( 23 Feb 2019 06:05 )             33.7     02-23    148<H>  |  114<H>  |  13  ----------------------------<  121<H>  3.6   |  29  |  0.88    Ca    8.4<L>      23 Feb 2019 06:05    TPro  6.3  /  Alb  1.7<L>  /  TBili  0.4  /  DBili  x   /  AST  20  /  ALT  16  /  AlkPhos  98  02-23          Assessment and Plan:     85F with acute CVA and fever    # Acute/Subacute CVA: with some LUE weakness  # Hx of Left AKA  - Patient was unable to tolerate MRI of the head/neck, Hence repeat CT of head after 48 hours did not show any changes  - ASA, Lipitor  -Cardiology eval appreciated, high CHADS score for afib, should be AC, d/w  who discussed with family and they want to wait for AC at this time  -speech and swallow/dysphagia eval appreciated, puree diet  -fall px, aspiration px  -BP control    * Osteomyelitis of the sacral area  -sacral decubitus ulcer seen at bedside with nursing. oozing yellow drainage with dead tissue sloughing off near the spine. Unstageable, able to see the bone.  -vanco and zosyn  -ID Cx appreciated, d/w   -f/u all cultures, blood/urine  - MRI of Pelvis with OM  -tylenol prn fever    *AFib, not on AC  - discussed with daughter in detail over the phone and family in the room, r/b of AC, they want to still think about it.   - c/w metoprolol, verapamil  - rate controlled  - f/u cardio    *PVD s/p left AKA  -fall px  -c/w ASA    *HTN:  -c/w metoprolol, verapamil    *Dementia  -supportive care  -aspiration/fall px    *Depression  -mood appears stable    *DVT px:  -Heparin sq    * Hypoglycemia: ? even on D5  - lantus dose reduced to 25 units  - endo evaluation appreciated.     Discusssed in length about treating Osteomyelitis;  antibiotics with comfort care VS antibiotis/debridement/wound Vac/colostomy to prevent recurrent infection/muscle flap.   Given her age, co-morbid conditions and dementia, surgical option is high risk and may not even be a candidate  Family to discuss among themselves and offer a decision.  Will place palliative consult as family having difficult time with decision making despite hospitalist discussion

## 2019-02-25 LAB
ANION GAP SERPL CALC-SCNC: 7 MMOL/L — SIGNIFICANT CHANGE UP (ref 5–17)
BUN SERPL-MCNC: 7 MG/DL — SIGNIFICANT CHANGE UP (ref 7–23)
CALCIUM SERPL-MCNC: 8.7 MG/DL — SIGNIFICANT CHANGE UP (ref 8.5–10.1)
CHLORIDE SERPL-SCNC: 113 MMOL/L — HIGH (ref 96–108)
CO2 SERPL-SCNC: 27 MMOL/L — SIGNIFICANT CHANGE UP (ref 22–31)
CREAT SERPL-MCNC: 0.8 MG/DL — SIGNIFICANT CHANGE UP (ref 0.5–1.3)
GLUCOSE SERPL-MCNC: 81 MG/DL — SIGNIFICANT CHANGE UP (ref 70–99)
HCT VFR BLD CALC: 33.5 % — LOW (ref 34.5–45)
HGB BLD-MCNC: 10.6 G/DL — LOW (ref 11.5–15.5)
MCHC RBC-ENTMCNC: 28 PG — SIGNIFICANT CHANGE UP (ref 27–34)
MCHC RBC-ENTMCNC: 31.6 GM/DL — LOW (ref 32–36)
MCV RBC AUTO: 88.6 FL — SIGNIFICANT CHANGE UP (ref 80–100)
NRBC # BLD: 0 /100 WBCS — SIGNIFICANT CHANGE UP (ref 0–0)
PLATELET # BLD AUTO: 354 K/UL — SIGNIFICANT CHANGE UP (ref 150–400)
POTASSIUM SERPL-MCNC: 2.8 MMOL/L — CRITICAL LOW (ref 3.5–5.3)
POTASSIUM SERPL-SCNC: 2.8 MMOL/L — CRITICAL LOW (ref 3.5–5.3)
RBC # BLD: 3.78 M/UL — LOW (ref 3.8–5.2)
RBC # FLD: 14.2 % — SIGNIFICANT CHANGE UP (ref 10.3–14.5)
SODIUM SERPL-SCNC: 147 MMOL/L — HIGH (ref 135–145)
WBC # BLD: 10.41 K/UL — SIGNIFICANT CHANGE UP (ref 3.8–10.5)
WBC # FLD AUTO: 10.41 K/UL — SIGNIFICANT CHANGE UP (ref 3.8–10.5)

## 2019-02-25 RX ORDER — POTASSIUM CHLORIDE 20 MEQ
40 PACKET (EA) ORAL ONCE
Qty: 0 | Refills: 0 | Status: DISCONTINUED | OUTPATIENT
Start: 2019-02-25 | End: 2019-03-04

## 2019-02-25 RX ORDER — POTASSIUM CHLORIDE 20 MEQ
10 PACKET (EA) ORAL
Qty: 0 | Refills: 0 | Status: COMPLETED | OUTPATIENT
Start: 2019-02-25 | End: 2019-02-25

## 2019-02-25 RX ADMIN — MEMANTINE HYDROCHLORIDE 10 MILLIGRAM(S): 10 TABLET ORAL at 17:19

## 2019-02-25 RX ADMIN — HEPARIN SODIUM 5000 UNIT(S): 5000 INJECTION INTRAVENOUS; SUBCUTANEOUS at 12:44

## 2019-02-25 RX ADMIN — Medication 1 TABLET(S): at 05:32

## 2019-02-25 RX ADMIN — Medication 100 MILLIGRAM(S): at 17:19

## 2019-02-25 RX ADMIN — PIPERACILLIN AND TAZOBACTAM 25 GRAM(S): 4; .5 INJECTION, POWDER, LYOPHILIZED, FOR SOLUTION INTRAVENOUS at 22:02

## 2019-02-25 RX ADMIN — Medication 500 MILLIGRAM(S): at 12:45

## 2019-02-25 RX ADMIN — Medication 80 MILLIGRAM(S): at 05:31

## 2019-02-25 RX ADMIN — Medication 100 MILLIEQUIVALENT(S): at 07:19

## 2019-02-25 RX ADMIN — Medication 3 MILLIGRAM(S): at 22:02

## 2019-02-25 RX ADMIN — Medication 250 MILLIGRAM(S): at 05:32

## 2019-02-25 RX ADMIN — Medication 100 MILLIEQUIVALENT(S): at 12:43

## 2019-02-25 RX ADMIN — Medication 12.5 MILLIGRAM(S): at 05:31

## 2019-02-25 RX ADMIN — MIRTAZAPINE 15 MILLIGRAM(S): 45 TABLET, ORALLY DISINTEGRATING ORAL at 22:02

## 2019-02-25 RX ADMIN — ZINC SULFATE TAB 220 MG (50 MG ZINC EQUIVALENT) 220 MILLIGRAM(S): 220 (50 ZN) TAB at 12:45

## 2019-02-25 RX ADMIN — PIPERACILLIN AND TAZOBACTAM 25 GRAM(S): 4; .5 INJECTION, POWDER, LYOPHILIZED, FOR SOLUTION INTRAVENOUS at 13:02

## 2019-02-25 RX ADMIN — HEPARIN SODIUM 5000 UNIT(S): 5000 INJECTION INTRAVENOUS; SUBCUTANEOUS at 22:02

## 2019-02-25 RX ADMIN — MEMANTINE HYDROCHLORIDE 10 MILLIGRAM(S): 10 TABLET ORAL at 05:31

## 2019-02-25 RX ADMIN — Medication 12.5 MILLIGRAM(S): at 17:19

## 2019-02-25 RX ADMIN — Medication 250 MILLIGRAM(S): at 22:00

## 2019-02-25 RX ADMIN — PIPERACILLIN AND TAZOBACTAM 25 GRAM(S): 4; .5 INJECTION, POWDER, LYOPHILIZED, FOR SOLUTION INTRAVENOUS at 07:19

## 2019-02-25 RX ADMIN — ATORVASTATIN CALCIUM 40 MILLIGRAM(S): 80 TABLET, FILM COATED ORAL at 22:03

## 2019-02-25 RX ADMIN — Medication 81 MILLIGRAM(S): at 12:45

## 2019-02-25 RX ADMIN — Medication 1 TABLET(S): at 17:19

## 2019-02-25 RX ADMIN — HEPARIN SODIUM 5000 UNIT(S): 5000 INJECTION INTRAVENOUS; SUBCUTANEOUS at 05:31

## 2019-02-25 RX ADMIN — Medication 100 MILLIEQUIVALENT(S): at 12:44

## 2019-02-25 NOTE — CONSULT NOTE ADULT - SUBJECTIVE AND OBJECTIVE BOX
HPI: Pt is a 85y old Female with pertinent hx of profound dementia(currently non-verbal) recently admitted for CVA. Pt had a NIHSS of 18 but was not a candidate for TPA. Palliative Medicine consulted to help with establishment of goals of care discussion with family. Pt seen and examined at bedside but unable to provide any pertinent narrative. Chart review reveals hypokalemia overnight.          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 Unable to gather further information    Hx opiate tolerance ( )YES  ( )NO    Baseline ADLs  (Prior to Admission)     (x )Dependent    FAMILY HISTORY:  unable to obtain due to profound dementia      Review of Systems:    Unable to obtain/Limited due to: profound dementia    PHYSICAL EXAM:    Vital Signs Last 24 Hrs  T(C): 36.6 (25 Feb 2019 10:14), Max: 37.9 (24 Feb 2019 21:21)  T(F): 97.9 (25 Feb 2019 10:14), Max: 100.3 (24 Feb 2019 21:21)  HR: 66 (25 Feb 2019 10:14) (66 - 75)  BP: 137/42 (25 Feb 2019 10:14) (135/41 - 154/62)  BP(mean): --  RR: 17 (25 Feb 2019 10:14) (17 - 18)  SpO2: 95% (25 Feb 2019 10:14) (95% - 98%)  Daily     Daily     PPSV2:  20-30 %  FAST: 7C    General: NAD  Mental Status: responds to verbal stimuli  HEENT: PERRLA  Lungs:CTA b/l  Cardiac: S1,S2, RRR  GI:soft, NT, ND x 4 quads + bowel sounds  Ext: L BKA present, contracted LUE  Neuro: unable to fully assess. able to withdraw to painful stimuli      LABS:                        10.6   10.41 )-----------( 354      ( 25 Feb 2019 05:56 )             33.5     02-25    147<H>  |  113<H>  |  7   ----------------------------<  81  2.8<LL>   |  27  |  0.80    Ca    8.7      25 Feb 2019 05:56        Albumin: Albumin, Serum: 1.7 g/dL (02-23 @ 06:05)      Allergies    iodine (Other (Unknown))  sulfa drugs (Other (Unknown))    Intolerances      MEDICATIONS  (STANDING):  acetaminophen  Suppository .. 650 milliGRAM(s) Rectal once  ascorbic acid 500 milliGRAM(s) Oral daily  aspirin  chewable 81 milliGRAM(s) Oral daily  atorvastatin 40 milliGRAM(s) Oral at bedtime  calcium carbonate 1250 mG  + Vitamin D (OsCal 500 + D) 1 Tablet(s) Oral two times a day  desmopressin 0.01% Nasal 1 Spray(s) Nasal daily  dextrose 5%. 1000 milliLiter(s) (50 mL/Hr) IV Continuous <Continuous>  dextrose 50% Injectable 12.5 Gram(s) IV Push once  dextrose 50% Injectable 25 Gram(s) IV Push once  dextrose 50% Injectable 25 Gram(s) IV Push once  docusate sodium 100 milliGRAM(s) Oral two times a day  heparin  Injectable 5000 Unit(s) SubCutaneous every 8 hours  insulin glargine Injectable (LANTUS) 25 Unit(s) SubCutaneous at bedtime  insulin lispro (HumaLOG) corrective regimen sliding scale   SubCutaneous three times a day before meals  insulin lispro (HumaLOG) corrective regimen sliding scale   SubCutaneous at bedtime  melatonin 3 milliGRAM(s) Oral at bedtime  memantine 10 milliGRAM(s) Oral two times a day  metoprolol tartrate 12.5 milliGRAM(s) Oral two times a day  mirtazapine 15 milliGRAM(s) Oral at bedtime  piperacillin/tazobactam IVPB. 3.375 Gram(s) IV Intermittent every 8 hours  potassium chloride    Tablet ER 40 milliEquivalent(s) Oral once  potassium chloride  10 mEq/100 mL IVPB 10 milliEquivalent(s) IV Intermittent every 1 hour  sodium chloride 0.9%. 1000 milliLiter(s) (75 mL/Hr) IV Continuous <Continuous>  vancomycin  IVPB 750 milliGRAM(s) IV Intermittent every 12 hours  verapamil 80 milliGRAM(s) Oral daily  zinc sulfate 220 milliGRAM(s) Oral daily    MEDICATIONS  (PRN):  acetaminophen   Tablet .. 650 milliGRAM(s) Oral every 6 hours PRN Temp greater or equal to 38C (100.4F), Mild Pain (1 - 3)  dextrose 40% Gel 15 Gram(s) Oral once PRN Blood Glucose LESS THAN 70 milliGRAM(s)/deciliter  glucagon  Injectable 1 milliGRAM(s) IntraMuscular once PRN Glucose LESS THAN 70 milligrams/deciliter      RADIOLOGY/ADDITIONAL STUDIES:    CT brain:   Evaluation demonstrates no evidence of mass-effect or midline shift. No   intraparenchymal mass lesions or hemorrhage is identified. There is   predominantly central age typical  atrophy and chronic white matter   degeneration.  There is no evidence of hydrocephalus. No extra-axial   collections are noted.    The bone windows demonstrate no gross osseous abnormalities.        Impression:  1. no acute findings.          DANIEL SIMPSON   This document has been electronically signed. Feb 21 2019  9:44AM    Chest xray:   Hazy opacities in the right upper lobe and left lung base consistent with   atelectasis and/or infiltrate.    Probable small left pleural effusion.      RICHMOND MENDOZA M.D., ATTENDING RADIOLOGIST  This document has been electronically signed. Feb 19 2019  8:35AM      MR pelvis: Large sacral decubitus ulcer. Acute osteomyelitis along the distal tip of   the S5 segment with periosseous edema extending along both the anterior   and posterior cortices and the sacral canal to the level of S4. No   drainable fluid collection. Cellulitis is noted. The coccygeal segments   are not present and may be surgically absent.    Large amount of stool within the rectum.      AMOL BLACKMAN M.D., ATTENDING RADIOLOGIST  This document has been electronically signed. Feb 20 2019  4:37PM    CT brain:   No acute intracranial hemorrhage, mass effect, or midline shift.     These results were communicated to Dr. Ngo by me over telephone at   10:07 AM on 2/18/2019.        JERRY DE LA PAZ   This document has been electronically signed. Feb 18 2019 10:07AM HPI: Ms. Aragon 85y old Female coming from [facility] with pertinent hx of profound dementia(currently non-verbal), afib (not on AC), PAD (s/p L BKA), DM2, admitted 2/18 for facial droop and LUE weakness. . Pt had a NIHSS of 18 but was not a candidate for TPA. CTH x2 showing no acute pathology and unable to do MRI. Also found to have leukocytosis with sacral OM as presumed cause. Palliative Medicine consulted to help with establishment of goals of care discussion with family.     Pt seen and examined at bedside but unable to provide any pertinent narrative. Chart review reveals hypokalemia overnight.      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 Unable to gather further information    Hx opiate tolerance ( )YES  ( )NO    Baseline ADLs  (Prior to Admission)     (x )Dependent    FAMILY HISTORY:  unable to obtain due to profound dementia      Review of Systems:    Unable to obtain/Limited due to: profound dementia    PHYSICAL EXAM:    Vital Signs Last 24 Hrs  T(C): 36.6 (25 Feb 2019 10:14), Max: 37.9 (24 Feb 2019 21:21)  T(F): 97.9 (25 Feb 2019 10:14), Max: 100.3 (24 Feb 2019 21:21)  HR: 66 (25 Feb 2019 10:14) (66 - 75)  BP: 137/42 (25 Feb 2019 10:14) (135/41 - 154/62)  BP(mean): --  RR: 17 (25 Feb 2019 10:14) (17 - 18)  SpO2: 95% (25 Feb 2019 10:14) (95% - 98%)  Daily     Daily     PPSV2:  20-30 %  FAST: 7C    General: Older female lying in bed, lethargic, responds to verbal stimuli, NAD  Mental Status: alert, awakens to verbal stimuli, but unable to orient  HEENT: PERRLA  Lungs:CTA b/l  Cardiac: S1,S2, RRR  GI:soft, NT, ND x 4 quads + bowel sounds  Ext: L BKA present, contracted LUE  Neuro: unable to fully assess. able to withdraw to painful stimuli      LABS:                        10.6   10.41 )-----------( 354      ( 25 Feb 2019 05:56 )             33.5     02-25    147<H>  |  113<H>  |  7   ----------------------------<  81  2.8<LL>   |  27  |  0.80    Ca    8.7      25 Feb 2019 05:56        Albumin: Albumin, Serum: 1.7 g/dL (02-23 @ 06:05)      Allergies    iodine (Other (Unknown))  sulfa drugs (Other (Unknown))    Intolerances      MEDICATIONS  (STANDING):  acetaminophen  Suppository .. 650 milliGRAM(s) Rectal once  ascorbic acid 500 milliGRAM(s) Oral daily  aspirin  chewable 81 milliGRAM(s) Oral daily  atorvastatin 40 milliGRAM(s) Oral at bedtime  calcium carbonate 1250 mG  + Vitamin D (OsCal 500 + D) 1 Tablet(s) Oral two times a day  desmopressin 0.01% Nasal 1 Spray(s) Nasal daily  dextrose 5%. 1000 milliLiter(s) (50 mL/Hr) IV Continuous <Continuous>  dextrose 50% Injectable 12.5 Gram(s) IV Push once  dextrose 50% Injectable 25 Gram(s) IV Push once  dextrose 50% Injectable 25 Gram(s) IV Push once  docusate sodium 100 milliGRAM(s) Oral two times a day  heparin  Injectable 5000 Unit(s) SubCutaneous every 8 hours  insulin glargine Injectable (LANTUS) 25 Unit(s) SubCutaneous at bedtime  insulin lispro (HumaLOG) corrective regimen sliding scale   SubCutaneous three times a day before meals  insulin lispro (HumaLOG) corrective regimen sliding scale   SubCutaneous at bedtime  melatonin 3 milliGRAM(s) Oral at bedtime  memantine 10 milliGRAM(s) Oral two times a day  metoprolol tartrate 12.5 milliGRAM(s) Oral two times a day  mirtazapine 15 milliGRAM(s) Oral at bedtime  piperacillin/tazobactam IVPB. 3.375 Gram(s) IV Intermittent every 8 hours  potassium chloride    Tablet ER 40 milliEquivalent(s) Oral once  potassium chloride  10 mEq/100 mL IVPB 10 milliEquivalent(s) IV Intermittent every 1 hour  sodium chloride 0.9%. 1000 milliLiter(s) (75 mL/Hr) IV Continuous <Continuous>  vancomycin  IVPB 750 milliGRAM(s) IV Intermittent every 12 hours  verapamil 80 milliGRAM(s) Oral daily  zinc sulfate 220 milliGRAM(s) Oral daily    MEDICATIONS  (PRN):  acetaminophen   Tablet .. 650 milliGRAM(s) Oral every 6 hours PRN Temp greater or equal to 38C (100.4F), Mild Pain (1 - 3)  dextrose 40% Gel 15 Gram(s) Oral once PRN Blood Glucose LESS THAN 70 milliGRAM(s)/deciliter  glucagon  Injectable 1 milliGRAM(s) IntraMuscular once PRN Glucose LESS THAN 70 milligrams/deciliter      RADIOLOGY/ADDITIONAL STUDIES:    CT brain:   Evaluation demonstrates no evidence of mass-effect or midline shift. No   intraparenchymal mass lesions or hemorrhage is identified. There is   predominantly central age typical  atrophy and chronic white matter   degeneration.  There is no evidence of hydrocephalus. No extra-axial   collections are noted.    The bone windows demonstrate no gross osseous abnormalities.        Impression:  1. no acute findings.          DANIEL SIMPSON   This document has been electronically signed. Feb 21 2019  9:44AM    Chest xray:   Hazy opacities in the right upper lobe and left lung base consistent with   atelectasis and/or infiltrate.    Probable small left pleural effusion.      RICHMOND MENDOZA M.D., ATTENDING RADIOLOGIST  This document has been electronically signed. Feb 19 2019  8:35AM      MR pelvis: Large sacral decubitus ulcer. Acute osteomyelitis along the distal tip of   the S5 segment with periosseous edema extending along both the anterior   and posterior cortices and the sacral canal to the level of S4. No   drainable fluid collection. Cellulitis is noted. The coccygeal segments   are not present and may be surgically absent.    Large amount of stool within the rectum.      AMOL BLACKMAN M.D., ATTENDING RADIOLOGIST  This document has been electronically signed. Feb 20 2019  4:37PM    CT brain:   No acute intracranial hemorrhage, mass effect, or midline shift.     These results were communicated to Dr. Ngo by me over telephone at   10:07 AM on 2/18/2019.        JERRY DE LA PAZ   This document has been electronically signed. Feb 18 2019 10:07AM

## 2019-02-25 NOTE — CONSULT NOTE ADULT - ATTENDING COMMENTS
Pt seen and examined this am alongside Dr. Rodriguez. Agree with H/p and A/P with following additions.     Pt unable to verbalize any responses to questions though appeared to be quite comfortable. Also did not follow commands. Spoke to daughter Deepti, briefly over the phone and she noted previous conversation with Dr. England laying out options (comfort focus vs. further intervention like debridement). She notes family is overwhelmed and want to give pt best chance. Also shared that pt was in dire straights when she had her amputation. Explained that protein levels are low and pt still quite ill, thus clinicians concerned that she would be high risk for procedure and not healing well afterward. She was open to this perspective, wanting to speak with Dr. Hargrove further about the overall medical condition. She was open to meeting with us in-person tomorrow with her  to help her make a plan. She seemed overwhelmed, but open to assistance.     Physical Exam  PPSV- 20%  FAST 7c  Gen- elderly AA female positioned upright in bed, somewhat slumped to the left side, arouses momentarily when name called, does not follow commands, NAD  Mental Status: Unable to gather 2/2 to advanced dementia  HEENT- dmm, perrl, +temporal wasting  Lung- dec at bases bl  CVS- +s1 s2 irrr  GI- soft nt mild distention, +bs  - incontinent  Ext- moves to touch not command, some muscle and fat wasting with skin breakdown and boot on RLE, LBKA  Neuro- limited my mental status, moves to touch, left arm held in flexion    Agree with plan outlined above. Discussed with Dr. Hargrove    Thank you for including us in Ms. Lockhart's care. Will continue to follow with you.    Mariano Wise MD  Palliative Care Attending

## 2019-02-25 NOTE — PROGRESS NOTE ADULT - SUBJECTIVE AND OBJECTIVE BOX
CHIEF COMPLAINT/Diagnosis: osteo of the sacral spine/ unstageable sacral pressure ulcer    SUBJECTIVE: patient appears comfortable.     REVIEW OF SYSTEMS:    can not obtain secondary to dementia    Vital Signs Last 24 Hrs  T(C): 36.6 (25 Feb 2019 10:14), Max: 37.9 (24 Feb 2019 21:21)  T(F): 97.9 (25 Feb 2019 10:14), Max: 100.3 (24 Feb 2019 21:21)  HR: 77 (25 Feb 2019 17:23) (66 - 77)  BP: 143/92 (25 Feb 2019 17:23) (135/41 - 153/85)  BP(mean): --  RR: 17 (25 Feb 2019 10:14) (17 - 18)  SpO2: 95% (25 Feb 2019 10:14) (95% - 98%)    I&O's Summary      CAPILLARY BLOOD GLUCOSE      POCT Blood Glucose.: 81 mg/dL (25 Feb 2019 17:10)  POCT Blood Glucose.: 99 mg/dL (25 Feb 2019 13:01)  POCT Blood Glucose.: 84 mg/dL (25 Feb 2019 07:59)  POCT Blood Glucose.: 137 mg/dL (24 Feb 2019 21:52)  POCT Blood Glucose.: 97 mg/dL (24 Feb 2019 18:16)      PHYSICAL EXAM:    Constitutional: NAD, awake and alert, well-developed  HEENT: PERR, EOMI, Normal Hearing, MMM  Neck: Soft and supple, No LAD, No JVD  Respiratory: Breath sounds are clear bilaterally, No wheezing, rales or rhonchi  Cardiovascular: S1 and S2, regular rate and rhythm, no Murmurs, gallops or rubs  Gastrointestinal: Bowel Sounds present, soft, nontender, nondistended, no guarding, no rebound  Extremities: No peripheral edema  Vascular: 2+ peripheral pulses  Neurological: A/O x 3, no focal deficits  Musculoskeletal: 5/5 strength b/l upper and lower extremities  Skin: No rashes    MEDICATIONS:  MEDICATIONS  (STANDING):  acetaminophen  Suppository .. 650 milliGRAM(s) Rectal once  ascorbic acid 500 milliGRAM(s) Oral daily  aspirin  chewable 81 milliGRAM(s) Oral daily  atorvastatin 40 milliGRAM(s) Oral at bedtime  calcium carbonate 1250 mG  + Vitamin D (OsCal 500 + D) 1 Tablet(s) Oral two times a day  desmopressin 0.01% Nasal 1 Spray(s) Nasal daily  dextrose 5%. 1000 milliLiter(s) (50 mL/Hr) IV Continuous <Continuous>  dextrose 50% Injectable 12.5 Gram(s) IV Push once  dextrose 50% Injectable 25 Gram(s) IV Push once  dextrose 50% Injectable 25 Gram(s) IV Push once  docusate sodium 100 milliGRAM(s) Oral two times a day  heparin  Injectable 5000 Unit(s) SubCutaneous every 8 hours  insulin glargine Injectable (LANTUS) 25 Unit(s) SubCutaneous at bedtime  insulin lispro (HumaLOG) corrective regimen sliding scale   SubCutaneous three times a day before meals  insulin lispro (HumaLOG) corrective regimen sliding scale   SubCutaneous at bedtime  melatonin 3 milliGRAM(s) Oral at bedtime  memantine 10 milliGRAM(s) Oral two times a day  metoprolol tartrate 12.5 milliGRAM(s) Oral two times a day  mirtazapine 15 milliGRAM(s) Oral at bedtime  piperacillin/tazobactam IVPB. 3.375 Gram(s) IV Intermittent every 8 hours  potassium chloride    Tablet ER 40 milliEquivalent(s) Oral once  sodium chloride 0.9%. 1000 milliLiter(s) (75 mL/Hr) IV Continuous <Continuous>  vancomycin  IVPB 750 milliGRAM(s) IV Intermittent every 12 hours  verapamil 80 milliGRAM(s) Oral daily  zinc sulfate 220 milliGRAM(s) Oral daily      LABS: All Labs Reviewed:                        10.6   10.41 )-----------( 354      ( 25 Feb 2019 05:56 )             33.5     02-25    147<H>  |  113<H>  |  7   ----------------------------<  81  2.8<LL>   |  27  |  0.80    Ca    8.7      25 Feb 2019 05:56              Assessment and Plan:     85F with acute CVA and fever    # Acute/Subacute CVA: with some LUE weakness  # Hx of Left AKA  - Patient was unable to tolerate MRI of the head/neck, Hence repeat CT of head after 48 hours did not show any changes  - ASA, Lipitor  -Cardiology eval appreciated, high CHADS score for afib, should be AC, d/w  who discussed with family and they want to wait for AC at this time  -speech and swallow/dysphagia eval appreciated, puree diet  -fall px, aspiration px  -BP control    * Osteomyelitis of the sacral area  -sacral decubitus ulcer seen at bedside with nursing. oozing yellow drainage with dead tissue sloughing off near the spine. Unstageable, able to see the bone.  -vanco and zosyn  -ID Cx appreciated, d/w   -f/u all cultures, blood/urine  - MRI of Pelvis with OM  -tylenol prn fever    *AFib, not on AC  - discussed with daughter in detail over the phone and family in the room, r/b of AC, they want to still think about it.   - c/w metoprolol, verapamil  - rate controlled  - f/u cardio    *PVD s/p left AKA  -fall px  -c/w ASA    *HTN:  -c/w metoprolol, verapamil    *Dementia  -supportive care  -aspiration/fall px    *Depression  -mood appears stable    *DVT px:  -Heparin sq    * Hypoglycemia: ? even on D5  - lantus dose reduced to 25 units  - endo evaluation appreciated.     Discusssed in length about treating Osteomyelitis;  antibiotics with comfort care VS antibiotis/debridement/wound Vac/colostomy to prevent recurrent infection/muscle flap.   Given her age, co-morbid conditions and dementia, surgical option is high risk and may not even be a candidate  Family to discuss among themselves and offer a decision.  Palliative consult appreciated   Will phone daughter in early AM to described prognossis, which is grim given patients co morbidities and poor quality of life. Surgical intervention for the sacral wound likley to be futile in this bed bound patient and likely will be of no use and in fact cause further discomfort for the patient, in terms of the OR, surgery, further intervention, blood draws, long time abx needs, etc.

## 2019-02-25 NOTE — CONSULT NOTE ADULT - ASSESSMENT
Pt is a 85y old Female with pertinent hx of profound dementia(currently non-verbal) recently admitted for CVA. Pt had a NIHSS of 18 but was not a candidate for TPA. Palliative Medicine consulted to help with establishment of goals of care discussion with family      1)CVA  -c/w with aspirin and statin  -fall and aspiration precautions    2)Hx of afib  -CHADS VASC noted to be 8  -may benefit from anticoagulation but family on the fence    3) Debility  -PPSV score of 20-30  -seen and evaluated by speech and swallow: currently on a puree diet    4) Osteomyelitis pf sacrum  -currently on vanco and zosyn #7  -local wound care as tolerated  -trend cbc  -ID following     5)Prognosis  -poor  -advanced age, dementia, malnutrition, cva, fully dependent for ADLs status    6)Goals of care/ advanced directives  -pt with no decison making capacity  -will contact proxy/ NOK to establish goals of care  -code status unclear 85y old Female coming from [facility] with pertinent hx of profound dementia(currently non-verbal), afib (not on AC), PAD (s/p L BKA), DM2, admitted 2/18 for facial droop and LUE weakness. . Pt had a NIHSS of 18 but was not a candidate for TPA. CTH x2 showing no acute pathology and unable to do MRI. Also found to have leukocytosis with sacral OM as presumed cause. Palliative Medicine consulted to help with establishment of goals of care discussion with family.     1) CVA/ hx of dementia  - acute sx without pathology on imaging  - neurology notes appreciated- Rt acute stroke no tpa given as per conversation between neuro and family  - family still deciding about anticoagulation  -c/w with aspirin and statin  -fall and aspiration precautions  - c/w dementia meds    2) Hx of afib  - cardiology notes appreciated  - c/w verapamil for rate control  -CHADS VASC noted to be 8  -may benefit from anticoagulation but family on the fence    3) Debility  - PPSV score of 20-30  - as per NH records bedbound at baseline, incontinent of urine and stool, with few words  -seen and evaluated by speech and swallow, with follow up today as well: no signs of aspiration, c/w puree diet  - dietary notes appreciated indicating severe protein calorie malnutrition    4) Osteomyelitis pf sacrum  -currently on vanco and zosyn #7  -local wound care as tolerated  -trend cbc  -ID following     5)Prognosis  -poor  -advanced age, dementia (FAST 7c, bedbound), severe malnutrition, cva, fully dependent for ADLs, PPS<40%, albumin 1.7, hospice eligible    6)Goals of care/ advanced directives  -pt with no decison making capacity  -  HCP in [facility]: 1) Deepti Herron [phone]; and 2) Janee Parker [phone]  [facility], will discuss  - GOC meeting tentatively scheduled for tomorrow at 10am (though daughter to call to confirm). Daughter (Deepti/HCP1) would like to speak to Dr. Hargrove about medical update since this weekend prior to this meeting, willing to review remainder of plan with us at meeting in-person.

## 2019-02-25 NOTE — CHART NOTE - NSCHARTNOTEFT_GEN_A_CORE
This SW and Dr. Wise spoke with pts daughter Deepti ([phone]) via phone. Pts daughter explained that she is deciding between possible surgery (if pt. is a candidate) Vs a comfort approach. She discussed how she is overwhelmed and how this is a hard decision. She would like to speak with Dr. Hargrove regarding above options as well. We discussed having a family meeting tomorrow, which she is agreeable to however, would like to speak with her  first and confirm a time and will contact this SW. Emotional support provided. Our team will continue to follow.

## 2019-02-25 NOTE — PROVIDER CONTACT NOTE (CRITICAL VALUE NOTIFICATION) - BACKGROUND
Pt adm for L facial droop, weakness and fever. PMHx; HTN, DM, Afib, HLD, dementia, PVD, and above knee amputation.

## 2019-02-26 RX ADMIN — Medication 1 TABLET(S): at 12:21

## 2019-02-26 RX ADMIN — DESMOPRESSIN ACETATE 1 SPRAY(S): 0.1 TABLET ORAL at 12:22

## 2019-02-26 RX ADMIN — ATORVASTATIN CALCIUM 40 MILLIGRAM(S): 80 TABLET, FILM COATED ORAL at 21:16

## 2019-02-26 RX ADMIN — Medication 500 MILLIGRAM(S): at 12:21

## 2019-02-26 RX ADMIN — Medication 80 MILLIGRAM(S): at 06:14

## 2019-02-26 RX ADMIN — Medication 250 MILLIGRAM(S): at 06:08

## 2019-02-26 RX ADMIN — Medication 12.5 MILLIGRAM(S): at 06:15

## 2019-02-26 RX ADMIN — PIPERACILLIN AND TAZOBACTAM 25 GRAM(S): 4; .5 INJECTION, POWDER, LYOPHILIZED, FOR SOLUTION INTRAVENOUS at 21:06

## 2019-02-26 RX ADMIN — MIRTAZAPINE 15 MILLIGRAM(S): 45 TABLET, ORALLY DISINTEGRATING ORAL at 21:09

## 2019-02-26 RX ADMIN — Medication 250 MILLIGRAM(S): at 17:07

## 2019-02-26 RX ADMIN — Medication 3 MILLIGRAM(S): at 21:09

## 2019-02-26 RX ADMIN — Medication 12.5 MILLIGRAM(S): at 17:07

## 2019-02-26 RX ADMIN — HEPARIN SODIUM 5000 UNIT(S): 5000 INJECTION INTRAVENOUS; SUBCUTANEOUS at 14:54

## 2019-02-26 RX ADMIN — HEPARIN SODIUM 5000 UNIT(S): 5000 INJECTION INTRAVENOUS; SUBCUTANEOUS at 21:09

## 2019-02-26 RX ADMIN — HEPARIN SODIUM 5000 UNIT(S): 5000 INJECTION INTRAVENOUS; SUBCUTANEOUS at 06:14

## 2019-02-26 RX ADMIN — SODIUM CHLORIDE 75 MILLILITER(S): 9 INJECTION INTRAMUSCULAR; INTRAVENOUS; SUBCUTANEOUS at 21:05

## 2019-02-26 RX ADMIN — MEMANTINE HYDROCHLORIDE 10 MILLIGRAM(S): 10 TABLET ORAL at 06:14

## 2019-02-26 RX ADMIN — Medication 81 MILLIGRAM(S): at 12:21

## 2019-02-26 RX ADMIN — ZINC SULFATE TAB 220 MG (50 MG ZINC EQUIVALENT) 220 MILLIGRAM(S): 220 (50 ZN) TAB at 12:22

## 2019-02-26 RX ADMIN — MEMANTINE HYDROCHLORIDE 10 MILLIGRAM(S): 10 TABLET ORAL at 17:07

## 2019-02-26 RX ADMIN — PIPERACILLIN AND TAZOBACTAM 25 GRAM(S): 4; .5 INJECTION, POWDER, LYOPHILIZED, FOR SOLUTION INTRAVENOUS at 14:53

## 2019-02-26 RX ADMIN — PIPERACILLIN AND TAZOBACTAM 25 GRAM(S): 4; .5 INJECTION, POWDER, LYOPHILIZED, FOR SOLUTION INTRAVENOUS at 06:08

## 2019-02-26 RX ADMIN — Medication 1 TABLET(S): at 17:08

## 2019-02-26 RX ADMIN — INSULIN GLARGINE 25 UNIT(S): 100 INJECTION, SOLUTION SUBCUTANEOUS at 21:09

## 2019-02-26 NOTE — PROGRESS NOTE ADULT - SUBJECTIVE AND OBJECTIVE BOX
CHIEF COMPLAINT/diagnosis: bed bound w/ unstageable sacral decubitus ulcer/ osteomyeltitis    SUBJECTIVE: appears comfortable; dementia    REVIEW OF SYSTEMS:    unable to obtain secondary to dementia    Vital Signs Last 24 Hrs  T(C): 37.1 (26 Feb 2019 05:10), Max: 37.1 (25 Feb 2019 21:44)  T(F): 98.8 (26 Feb 2019 05:10), Max: 98.8 (26 Feb 2019 05:10)  HR: 84 (26 Feb 2019 05:10) (66 - 84)  BP: 162/57 (26 Feb 2019 05:10) (137/42 - 162/57)  BP(mean): --  RR: 18 (26 Feb 2019 05:10) (17 - 18)  SpO2: 97% (26 Feb 2019 05:10) (95% - 97%)    I&O's Summary      CAPILLARY BLOOD GLUCOSE      POCT Blood Glucose.: 108 mg/dL (26 Feb 2019 08:16)  POCT Blood Glucose.: 84 mg/dL (25 Feb 2019 21:44)  POCT Blood Glucose.: 81 mg/dL (25 Feb 2019 17:10)  POCT Blood Glucose.: 99 mg/dL (25 Feb 2019 13:01)      PHYSICAL EXAM:    Constitutional: NAD, awake and alert, well-developed  HEENT: PERR, EOMI, Normal Hearing, MMM  Neck: Soft and supple, No LAD, No JVD  Respiratory: Breath sounds are clear bilaterally, No wheezing, rales or rhonchi  Cardiovascular: S1 and S2, regular rate and rhythm, no Murmurs, gallops or rubs  Gastrointestinal: Bowel Sounds present, soft, nontender, nondistended, no guarding, no rebound  Extremities: No peripheral edema  Vascular: 2+ peripheral pulses  Neurological: A/O x 3, no focal deficits  Musculoskeletal: 5/5 strength b/l upper and lower extremities  Skin: No rashes    MEDICATIONS:  MEDICATIONS  (STANDING):  acetaminophen  Suppository .. 650 milliGRAM(s) Rectal once  ascorbic acid 500 milliGRAM(s) Oral daily  aspirin  chewable 81 milliGRAM(s) Oral daily  atorvastatin 40 milliGRAM(s) Oral at bedtime  calcium carbonate 1250 mG  + Vitamin D (OsCal 500 + D) 1 Tablet(s) Oral two times a day  desmopressin 0.01% Nasal 1 Spray(s) Nasal daily  dextrose 5%. 1000 milliLiter(s) (50 mL/Hr) IV Continuous <Continuous>  dextrose 50% Injectable 12.5 Gram(s) IV Push once  dextrose 50% Injectable 25 Gram(s) IV Push once  dextrose 50% Injectable 25 Gram(s) IV Push once  docusate sodium 100 milliGRAM(s) Oral two times a day  heparin  Injectable 5000 Unit(s) SubCutaneous every 8 hours  insulin glargine Injectable (LANTUS) 25 Unit(s) SubCutaneous at bedtime  insulin lispro (HumaLOG) corrective regimen sliding scale   SubCutaneous three times a day before meals  insulin lispro (HumaLOG) corrective regimen sliding scale   SubCutaneous at bedtime  melatonin 3 milliGRAM(s) Oral at bedtime  memantine 10 milliGRAM(s) Oral two times a day  metoprolol tartrate 12.5 milliGRAM(s) Oral two times a day  mirtazapine 15 milliGRAM(s) Oral at bedtime  piperacillin/tazobactam IVPB. 3.375 Gram(s) IV Intermittent every 8 hours  potassium chloride    Tablet ER 40 milliEquivalent(s) Oral once  sodium chloride 0.9%. 1000 milliLiter(s) (75 mL/Hr) IV Continuous <Continuous>  vancomycin  IVPB 750 milliGRAM(s) IV Intermittent every 12 hours  verapamil 80 milliGRAM(s) Oral daily  zinc sulfate 220 milliGRAM(s) Oral daily      LABS: All Labs Reviewed:                        10.6   10.41 )-----------( 354      ( 25 Feb 2019 05:56 )             33.5     02-25    147<H>  |  113<H>  |  7   ----------------------------<  81  2.8<LL>   |  27  |  0.80    Ca    8.7      25 Feb 2019 05:56              Assessment and Plan:     85F with acute CVA and fever    # Acute/Subacute CVA: with some LUE weakness  # Hx of Left AKA  - Patient was unable to tolerate MRI of the head/neck, Hence repeat CT of head after 48 hours did not show any changes  - ASA, Lipitor  -Cardiology eval appreciated, high CHADS score for afib, should be AC, d/w  who discussed with family and they want to wait for AC at this time  -speech and swallow/dysphagia eval appreciated, puree diet  -fall px, aspiration px  -BP control    * Osteomyelitis of the sacral area  -sacral decubitus ulcer seen at bedside with nursing. oozing yellow drainage with dead tissue sloughing off near the spine. Unstageable, able to see the bone.  -vanco and zosyn  -ID Cx appreciated, d/w   -f/u all cultures, blood/urine  - MRI of Pelvis with OM  -tylenol prn fever    *AFib, not on AC  - discussed with daughter in detail over the phone and family in the room, r/b of AC, they want to still think about it.   - c/w metoprolol, verapamil  - rate controlled  - f/u cardio    *PVD s/p left AKA  -fall px  -c/w ASA    *HTN:  -c/w metoprolol, verapamil    *Dementia  -supportive care  -aspiration/fall px    *Depression  -mood appears stable    *DVT px:  -Heparin sq    * Hypoglycemia: ? even on D5  - lantus dose reduced to 25 units  - endo evaluation appreciated.     Discusssed in length about treating Osteomyelitis;  antibiotics with comfort care VS antibiotis/debridement/wound Vac/colostomy to prevent recurrent infection/muscle flap.   Given her age, co-morbid conditions and dementia, surgical option is high risk and may not even be a candidate  Family to discuss among themselves and offer a decision.  Palliative consult appreciated   Will phone daughter this AM  to described prognossis, which is grim given patients co morbidities and poor quality of life. Surgical intervention for the sacral wound likley to be futile in this bed bound patient and likely will be of no use and in fact cause further discomfort for the patient, in terms of the OR, surgery, further intervention, blood draws, long time abx needs, etc.

## 2019-02-26 NOTE — PROGRESS NOTE ADULT - SUBJECTIVE AND OBJECTIVE BOX
HPI: Pt seen and examined this am in follow up for sx and GOC, family at bedside. Pt awake, alert, unable to orient, but able to answer simple questions at times. Denies pain or discomfort, unable to gather further. As per RN, no issues.     Met with family for planned family meeting, see GOC note for additional details.       PAIN: denies  DYSPNEA: denies      ROS:    Unable to gather 2/2 to advanced dementia      PHYSICAL EXAM:    Vital Signs Last 24 Hrs  T(C): 36.6 (26 Feb 2019 10:39), Max: 37.1 (25 Feb 2019 21:44)  T(F): 97.8 (26 Feb 2019 10:39), Max: 98.8 (26 Feb 2019 05:10)  HR: 72 (26 Feb 2019 10:39) (72 - 84)  BP: 152/63 (26 Feb 2019 10:39) (143/92 - 162/57)  BP(mean): --  RR: 18 (26 Feb 2019 10:39) (17 - 18)  SpO2: 98% (26 Feb 2019 10:39) (97% - 98%)  Daily     Daily     PPSV2:  20-30 %  FAST: 7C    General: Older female lying in bed, lethargic, responds to verbal stimuli, NAD  Mental Status: alert, awakens to verbal stimuli, but unable to orient  HEENT: PERRLA  Lungs:CTA b/l  Cardiac: S1,S2, RRR  GI:soft, NT, ND + bowel sounds  Ext: L BKA present, contracted LUE  Neuro: unable to fully assess. responds to touch     LABS:                        10.6   10.41 )-----------( 354      ( 25 Feb 2019 05:56 )             33.5     02-25    147<H>  |  113<H>  |  7   ----------------------------<  81  2.8<LL>   |  27  |  0.80    Ca    8.7      25 Feb 2019 05:56        Albumin: Albumin, Serum: 1.7 g/dL (02-23 @ 06:05)      Allergies    iodine (Other (Unknown))  sulfa drugs (Other (Unknown))    Intolerances      MEDICATIONS  (STANDING):  acetaminophen  Suppository .. 650 milliGRAM(s) Rectal once  ascorbic acid 500 milliGRAM(s) Oral daily  aspirin  chewable 81 milliGRAM(s) Oral daily  atorvastatin 40 milliGRAM(s) Oral at bedtime  calcium carbonate 1250 mG  + Vitamin D (OsCal 500 + D) 1 Tablet(s) Oral two times a day  desmopressin 0.01% Nasal 1 Spray(s) Nasal daily  dextrose 5%. 1000 milliLiter(s) (50 mL/Hr) IV Continuous <Continuous>  dextrose 50% Injectable 12.5 Gram(s) IV Push once  dextrose 50% Injectable 25 Gram(s) IV Push once  dextrose 50% Injectable 25 Gram(s) IV Push once  docusate sodium 100 milliGRAM(s) Oral two times a day  heparin  Injectable 5000 Unit(s) SubCutaneous every 8 hours  insulin glargine Injectable (LANTUS) 25 Unit(s) SubCutaneous at bedtime  insulin lispro (HumaLOG) corrective regimen sliding scale   SubCutaneous three times a day before meals  insulin lispro (HumaLOG) corrective regimen sliding scale   SubCutaneous at bedtime  melatonin 3 milliGRAM(s) Oral at bedtime  memantine 10 milliGRAM(s) Oral two times a day  metoprolol tartrate 12.5 milliGRAM(s) Oral two times a day  mirtazapine 15 milliGRAM(s) Oral at bedtime  piperacillin/tazobactam IVPB. 3.375 Gram(s) IV Intermittent every 8 hours  potassium chloride    Tablet ER 40 milliEquivalent(s) Oral once  sodium chloride 0.9%. 1000 milliLiter(s) (75 mL/Hr) IV Continuous <Continuous>  vancomycin  IVPB 750 milliGRAM(s) IV Intermittent every 12 hours  verapamil 80 milliGRAM(s) Oral daily  zinc sulfate 220 milliGRAM(s) Oral daily    MEDICATIONS  (PRN):  acetaminophen   Tablet .. 650 milliGRAM(s) Oral every 6 hours PRN Temp greater or equal to 38C (100.4F), Mild Pain (1 - 3)  dextrose 40% Gel 15 Gram(s) Oral once PRN Blood Glucose LESS THAN 70 milliGRAM(s)/deciliter  glucagon  Injectable 1 milliGRAM(s) IntraMuscular once PRN Glucose LESS THAN 70 milligrams/deciliter      RADIOLOGY:

## 2019-02-27 PROCEDURE — 99223 1ST HOSP IP/OBS HIGH 75: CPT

## 2019-02-27 RX ADMIN — Medication 500 MILLIGRAM(S): at 12:49

## 2019-02-27 RX ADMIN — HEPARIN SODIUM 5000 UNIT(S): 5000 INJECTION INTRAVENOUS; SUBCUTANEOUS at 22:45

## 2019-02-27 RX ADMIN — Medication 80 MILLIGRAM(S): at 05:35

## 2019-02-27 RX ADMIN — Medication 12.5 MILLIGRAM(S): at 17:12

## 2019-02-27 RX ADMIN — Medication 250 MILLIGRAM(S): at 17:12

## 2019-02-27 RX ADMIN — MEMANTINE HYDROCHLORIDE 10 MILLIGRAM(S): 10 TABLET ORAL at 17:12

## 2019-02-27 RX ADMIN — PIPERACILLIN AND TAZOBACTAM 25 GRAM(S): 4; .5 INJECTION, POWDER, LYOPHILIZED, FOR SOLUTION INTRAVENOUS at 17:12

## 2019-02-27 RX ADMIN — MIRTAZAPINE 15 MILLIGRAM(S): 45 TABLET, ORALLY DISINTEGRATING ORAL at 22:44

## 2019-02-27 RX ADMIN — Medication 12.5 MILLIGRAM(S): at 05:34

## 2019-02-27 RX ADMIN — Medication 1 TABLET(S): at 17:13

## 2019-02-27 RX ADMIN — PIPERACILLIN AND TAZOBACTAM 25 GRAM(S): 4; .5 INJECTION, POWDER, LYOPHILIZED, FOR SOLUTION INTRAVENOUS at 22:46

## 2019-02-27 RX ADMIN — HEPARIN SODIUM 5000 UNIT(S): 5000 INJECTION INTRAVENOUS; SUBCUTANEOUS at 05:35

## 2019-02-27 RX ADMIN — INSULIN GLARGINE 25 UNIT(S): 100 INJECTION, SOLUTION SUBCUTANEOUS at 22:45

## 2019-02-27 RX ADMIN — Medication 3 MILLIGRAM(S): at 22:45

## 2019-02-27 RX ADMIN — SODIUM CHLORIDE 75 MILLILITER(S): 9 INJECTION INTRAMUSCULAR; INTRAVENOUS; SUBCUTANEOUS at 12:53

## 2019-02-27 RX ADMIN — MEMANTINE HYDROCHLORIDE 10 MILLIGRAM(S): 10 TABLET ORAL at 05:35

## 2019-02-27 RX ADMIN — HEPARIN SODIUM 5000 UNIT(S): 5000 INJECTION INTRAVENOUS; SUBCUTANEOUS at 12:49

## 2019-02-27 RX ADMIN — Medication 1 TABLET(S): at 05:35

## 2019-02-27 RX ADMIN — DESMOPRESSIN ACETATE 1 SPRAY(S): 0.1 TABLET ORAL at 12:50

## 2019-02-27 RX ADMIN — Medication 250 MILLIGRAM(S): at 05:30

## 2019-02-27 RX ADMIN — Medication 81 MILLIGRAM(S): at 12:49

## 2019-02-27 RX ADMIN — PIPERACILLIN AND TAZOBACTAM 25 GRAM(S): 4; .5 INJECTION, POWDER, LYOPHILIZED, FOR SOLUTION INTRAVENOUS at 05:32

## 2019-02-27 RX ADMIN — ATORVASTATIN CALCIUM 40 MILLIGRAM(S): 80 TABLET, FILM COATED ORAL at 22:45

## 2019-02-27 NOTE — PROGRESS NOTE ADULT - SUBJECTIVE AND OBJECTIVE BOX
CHIEF COMPLAINT/Diagnosis: sacral decubitus/ osteomyeltis/ Dementia    SUBJECTIVE: no complaints    REVIEW OF SYSTEMS:    CONSTITUTIONAL: No weakness, fevers or chills  EYES/ENT: No visual changes;  No vertigo or throat pain   NECK: No pain or stiffness  RESPIRATORY: No cough, wheezing, hemoptysis; No shortness of breath  CARDIOVASCULAR: No chest pain or palpitations  GASTROINTESTINAL: No abdominal or epigastric pain. No nausea, vomiting, or hematemesis; No diarrhea or constipation. No melena or hematochezia.  GENITOURINARY: No dysuria, frequency or hematuria  NEUROLOGICAL: No numbness or weakness  SKIN: No itching, burning, rashes, or lesions   All other review of systems is negative unless indicated above    Vital Signs Last 24 Hrs  T(C): 37.1 (27 Feb 2019 11:28), Max: 37.1 (27 Feb 2019 11:28)  T(F): 98.7 (27 Feb 2019 11:28), Max: 98.7 (27 Feb 2019 11:28)  HR: 83 (27 Feb 2019 11:28) (81 - 83)  BP: 139/57 (27 Feb 2019 11:28) (139/57 - 139/76)  BP(mean): --  RR: 18 (27 Feb 2019 11:28) (18 - 18)  SpO2: 99% (27 Feb 2019 11:28) (99% - 99%)    I&O's Summary      CAPILLARY BLOOD GLUCOSE      POCT Blood Glucose.: 106 mg/dL (27 Feb 2019 12:39)  POCT Blood Glucose.: 68 mg/dL (27 Feb 2019 08:32)  POCT Blood Glucose.: 232 mg/dL (26 Feb 2019 21:04)  POCT Blood Glucose.: 112 mg/dL (26 Feb 2019 17:04)      PHYSICAL EXAM:    Constitutional: NAD, awake and alert, well-developed  HEENT: PERR, EOMI, Normal Hearing, MMM  Neck: Soft and supple, No LAD, No JVD  Respiratory: Breath sounds are clear bilaterally, No wheezing, rales or rhonchi  Cardiovascular: S1 and S2, regular rate and rhythm, no Murmurs, gallops or rubs  Gastrointestinal: Bowel Sounds present, soft, nontender, nondistended, no guarding, no rebound  Extremities: No peripheral edema  Vascular: 2+ peripheral pulses  Neurological: A/O x 3, no focal deficits  Musculoskeletal: 5/5 strength b/l upper and lower extremities  Skin: No rashes    MEDICATIONS:  MEDICATIONS  (STANDING):  acetaminophen  Suppository .. 650 milliGRAM(s) Rectal once  ascorbic acid 500 milliGRAM(s) Oral daily  aspirin  chewable 81 milliGRAM(s) Oral daily  atorvastatin 40 milliGRAM(s) Oral at bedtime  calcium carbonate 1250 mG  + Vitamin D (OsCal 500 + D) 1 Tablet(s) Oral two times a day  desmopressin 0.01% Nasal 1 Spray(s) Nasal daily  dextrose 5%. 1000 milliLiter(s) (50 mL/Hr) IV Continuous <Continuous>  dextrose 50% Injectable 12.5 Gram(s) IV Push once  dextrose 50% Injectable 25 Gram(s) IV Push once  dextrose 50% Injectable 25 Gram(s) IV Push once  docusate sodium 100 milliGRAM(s) Oral two times a day  heparin  Injectable 5000 Unit(s) SubCutaneous every 8 hours  insulin glargine Injectable (LANTUS) 25 Unit(s) SubCutaneous at bedtime  insulin lispro (HumaLOG) corrective regimen sliding scale   SubCutaneous three times a day before meals  insulin lispro (HumaLOG) corrective regimen sliding scale   SubCutaneous at bedtime  melatonin 3 milliGRAM(s) Oral at bedtime  memantine 10 milliGRAM(s) Oral two times a day  metoprolol tartrate 12.5 milliGRAM(s) Oral two times a day  mirtazapine 15 milliGRAM(s) Oral at bedtime  piperacillin/tazobactam IVPB. 3.375 Gram(s) IV Intermittent every 8 hours  potassium chloride    Tablet ER 40 milliEquivalent(s) Oral once  sodium chloride 0.9%. 1000 milliLiter(s) (75 mL/Hr) IV Continuous <Continuous>  vancomycin  IVPB 750 milliGRAM(s) IV Intermittent every 12 hours  verapamil 80 milliGRAM(s) Oral daily  zinc sulfate 220 milliGRAM(s) Oral daily      LABS: All Labs Reviewed:      Assessment and Plan:     85F with acute CVA and fever    # Acute/Subacute CVA: with some LUE weakness  # Hx of Left AKA  - Patient was unable to tolerate MRI of the head/neck, Hence repeat CT of head after 48 hours did not show any changes  - ASA, Lipitor  -Cardiology eval appreciated, high CHADS score for afib, should be AC, d/w  who discussed with family and they want to wait for AC at this time  -speech and swallow/dysphagia eval appreciated, puree diet  -fall px, aspiration px  -BP control    * Osteomyelitis of the sacral area  -sacral decubitus ulcer seen at bedside with nursing. oozing yellow drainage with dead tissue sloughing off near the spine. Unstageable, able to see the bone.  -vanco and zosyn  -ID Cx appreciated, d/w   -f/u all cultures, blood/urine  - MRI of Pelvis with OM  -tylenol prn fever    *AFib, not on AC  - discussed with daughter in detail over the phone and family in the room, r/b of AC, they want to still think about it.   - c/w metoprolol, verapamil  - rate controlled  - f/u cardio    *PVD s/p left AKA  -fall px  -c/w ASA    *HTN:  -c/w metoprolol, verapamil    *Dementia  -supportive care  -aspiration/fall px    *Depression  -mood appears stable    *DVT px:  -Heparin sq    * Hypoglycemia: ? even on D5  - lantus dose reduced to 25 units  - endo evaluation appreciated.       *Goals of care discussion:  Met family in person, very involved and appear to care deeply for patient. They want what they feel is in her best interest and want to "give her a fighting chance". Discussed with them her overall poor prognosis given bedbound, and left leg amputee, with progressive dementia. Discussed surgical options and negatives and pros of surgery and not doing surgery. At this time they understand that she is poor prognoss, but also want to do what will help her and prolong her life a positive way. They are would like to speak with surgery team to talk about the surgical options, and then they will make a informed decision.  Discussed case with surgical team whom evaluated patient. patient is not a candidate for Flap palcement as the wound is still infected. Patient will need wound vac for and clearing of the wound, which  is not likley in this bed bound patient with dementia. She has limited mobility and also can not move herself around in anyway.   will call Deepti , the daughter and explain that patient is not a candidate for Flap, but can be a cadndidate for wound vac.

## 2019-02-27 NOTE — CONSULT NOTE ADULT - CONSULT REQUESTED DATE/TIME
18-Feb-2019 10:47
19-Feb-2019 07:49
19-Feb-2019 14:08
19-Feb-2019 14:31
22-Feb-2019 06:36
27-Feb-2019
25-Feb-2019 11:06

## 2019-02-27 NOTE — PROGRESS NOTE ADULT - SUBJECTIVE AND OBJECTIVE BOX
HPI: Pt seen and examined in follow up for donnie RN at bedside. Pt asleep but wakes to her name, unable to gather history from her, appears comfortable. RN not noting any issues.       PAIN: denies    DYSPNEA: denies      ROS:  Unable to gather 2/2 to advanced dementia      PHYSICAL EXAM:    Vital Signs Last 24 Hrs  T(C): 37.1 (27 Feb 2019 05:20), Max: 37.1 (27 Feb 2019 05:20)  T(F): 98.7 (27 Feb 2019 05:20), Max: 98.7 (27 Feb 2019 05:20)  HR: 83 (27 Feb 2019 05:20) (81 - 83)  BP: 139/57 (27 Feb 2019 05:20) (139/57 - 139/76)  BP(mean): --  RR: 18 (27 Feb 2019 05:20) (18 - 18)  SpO2: 99% (27 Feb 2019 05:20) (99% - 99%)  Daily     Daily       PPSV2:  20-30 %  FAST: 7C    General: Older female lying in bed, lethargic, responds to verbal stimuli, NAD  Mental Status: alert, awakens to verbal stimuli, but unable to orient  HEENT: PERRLA  Lungs:CTA b/l  Cardiac: S1,S2, RRR  GI:soft, NT, ND + bowel sounds  Ext: L BKA present, contracted LUE  Neuro: unable to fully assess responds to touch     LABS: none    Albumin: Albumin, Serum: 1.7 g/dL (02-23 @ 06:05)      Allergies    iodine (Other (Unknown))  sulfa drugs (Other (Unknown))    Intolerances      MEDICATIONS  (STANDING):  acetaminophen  Suppository .. 650 milliGRAM(s) Rectal once  ascorbic acid 500 milliGRAM(s) Oral daily  aspirin  chewable 81 milliGRAM(s) Oral daily  atorvastatin 40 milliGRAM(s) Oral at bedtime  calcium carbonate 1250 mG  + Vitamin D (OsCal 500 + D) 1 Tablet(s) Oral two times a day  desmopressin 0.01% Nasal 1 Spray(s) Nasal daily  dextrose 5%. 1000 milliLiter(s) (50 mL/Hr) IV Continuous <Continuous>  dextrose 50% Injectable 12.5 Gram(s) IV Push once  dextrose 50% Injectable 25 Gram(s) IV Push once  dextrose 50% Injectable 25 Gram(s) IV Push once  docusate sodium 100 milliGRAM(s) Oral two times a day  heparin  Injectable 5000 Unit(s) SubCutaneous every 8 hours  insulin glargine Injectable (LANTUS) 25 Unit(s) SubCutaneous at bedtime  insulin lispro (HumaLOG) corrective regimen sliding scale   SubCutaneous three times a day before meals  insulin lispro (HumaLOG) corrective regimen sliding scale   SubCutaneous at bedtime  melatonin 3 milliGRAM(s) Oral at bedtime  memantine 10 milliGRAM(s) Oral two times a day  metoprolol tartrate 12.5 milliGRAM(s) Oral two times a day  mirtazapine 15 milliGRAM(s) Oral at bedtime  piperacillin/tazobactam IVPB. 3.375 Gram(s) IV Intermittent every 8 hours  potassium chloride    Tablet ER 40 milliEquivalent(s) Oral once  sodium chloride 0.9%. 1000 milliLiter(s) (75 mL/Hr) IV Continuous <Continuous>  vancomycin  IVPB 750 milliGRAM(s) IV Intermittent every 12 hours  verapamil 80 milliGRAM(s) Oral daily  zinc sulfate 220 milliGRAM(s) Oral daily    MEDICATIONS  (PRN):  acetaminophen   Tablet .. 650 milliGRAM(s) Oral every 6 hours PRN Temp greater or equal to 38C (100.4F), Mild Pain (1 - 3)  dextrose 40% Gel 15 Gram(s) Oral once PRN Blood Glucose LESS THAN 70 milliGRAM(s)/deciliter  glucagon  Injectable 1 milliGRAM(s) IntraMuscular once PRN Glucose LESS THAN 70 milligrams/deciliter

## 2019-02-27 NOTE — CONSULT NOTE ADULT - SUBJECTIVE AND OBJECTIVE BOX
Patient is a 85y old  Female who presents with a chief complaint of CVA (26 Feb 2019 10:56)    HPI:  85F with hx of DM Type II on Insulin, PVD s/p Left  AKA, dementia (non-verbal at baseline), Afib (not on AC), Major depression, HTN sent from [facility] for unresponsiveness and left sided weakness with left facial droop. Stroke code called, but patient noted to be febrile, tPA not given, d/w Daughter per Neurology note. Patient unable to provide any hx secondary to dementia and non-verbal status. Alert in ED, appears comfortable. (18 Feb 2019 17:41)  Surgery was consulted for ch sacral ulcer, no fever no cellulitis edges clean, fibrinous exudate in base, packing in place, no purulent drainage.  ROS:.  [] A ten-point review of systems was otherwise negative except as noted.  Systemic:	[ ] Fever	[ ] Chills	[ ] Night sweats    [ ] Fatigue	[ ] Other  [] Cardiovascular:  [] Pulmonary:  [] Renal/Urologic:  [] Gastrointestinal: abdominal pain, vomiting  [] Metabolic:  [] Neurologic:  [] Hematologic:  [] ENT:  [] Ophthalmologic:  [] Musculoskeletal:    [ X] Due to altered mental status/intubation, subjective information were not able to be obtained from the patient. History was obtained, to the extent possible, from review of the chart and collateral sources of information.    PAST MEDICAL & SURGICAL HISTORY:  Above knee amputation of left lower extremity  Dementia  Hyperlipidemia  Depression  Diabetes mellitus: type II  Atrial fibrillation  Essential hypertension  PVD (peripheral vascular disease)  Status post below knee amputation of left lower extremity    FAMILY HISTORY:  No pertinent family history in first degree relatives    Social History:    Alcohol: Denied  Smoking: Denied  Drug Use: Denied        Allergies    iodine (Other (Unknown))  sulfa drugs (Other (Unknown))    Intolerances      MEDICATIONS  (STANDING):  acetaminophen  Suppository .. 650 milliGRAM(s) Rectal once  ascorbic acid 500 milliGRAM(s) Oral daily  aspirin  chewable 81 milliGRAM(s) Oral daily  atorvastatin 40 milliGRAM(s) Oral at bedtime  calcium carbonate 1250 mG  + Vitamin D (OsCal 500 + D) 1 Tablet(s) Oral two times a day  desmopressin 0.01% Nasal 1 Spray(s) Nasal daily  dextrose 5%. 1000 milliLiter(s) (50 mL/Hr) IV Continuous <Continuous>  dextrose 50% Injectable 12.5 Gram(s) IV Push once  dextrose 50% Injectable 25 Gram(s) IV Push once  dextrose 50% Injectable 25 Gram(s) IV Push once  docusate sodium 100 milliGRAM(s) Oral two times a day  heparin  Injectable 5000 Unit(s) SubCutaneous every 8 hours  insulin glargine Injectable (LANTUS) 25 Unit(s) SubCutaneous at bedtime  insulin lispro (HumaLOG) corrective regimen sliding scale   SubCutaneous three times a day before meals  insulin lispro (HumaLOG) corrective regimen sliding scale   SubCutaneous at bedtime  melatonin 3 milliGRAM(s) Oral at bedtime  memantine 10 milliGRAM(s) Oral two times a day  metoprolol tartrate 12.5 milliGRAM(s) Oral two times a day  mirtazapine 15 milliGRAM(s) Oral at bedtime  piperacillin/tazobactam IVPB. 3.375 Gram(s) IV Intermittent every 8 hours  potassium chloride    Tablet ER 40 milliEquivalent(s) Oral once  sodium chloride 0.9%. 1000 milliLiter(s) (75 mL/Hr) IV Continuous <Continuous>  vancomycin  IVPB 750 milliGRAM(s) IV Intermittent every 12 hours  verapamil 80 milliGRAM(s) Oral daily  zinc sulfate 220 milliGRAM(s) Oral daily    Vital Signs Last 24 Hrs  T(C): 36.3 (26 Feb 2019 21:09), Max: 37.1 (26 Feb 2019 05:10)  T(F): 97.4 (26 Feb 2019 21:09), Max: 98.8 (26 Feb 2019 05:10)  HR: 81 (26 Feb 2019 21:09) (72 - 84)  BP: 139/76 (26 Feb 2019 21:09) (139/76 - 162/57)  BP(mean): --  RR: 18 (26 Feb 2019 21:09) (18 - 18)  SpO2: 99% (26 Feb 2019 21:09) (97% - 99%)  PHYSICAL EXAM:  Constitutional: NAD  AOX1  Eyes:  WNL  ENMT:  WNL  Neck:  WNL, non tender  Back: Non tender  Respiratory: CTABL  Cardiovascular:  S1+S2+0  Gastrointestinal: Soft, ND , NT  Genitourinary:  WNL  Extremities: NV intact  Vascular:  Intact  Neurological: full exam not possible  Skin: roughly 4  x 3 cm sacral ulcer with clean edges no cellulitis no active purulent drainage no fluctuation.  Musculoskeletal: WNL  Psychiatric: Grossly WNL      Labs:                          10.6   10.41 )-----------( 354      ( 25 Feb 2019 05:56 )             33.5       02-25    147<H>  |  113<H>  |  7   ----------------------------<  81  2.8<LL>   |  27  |  0.80    Ca    8.7      25 Feb 2019 05:56            Radiology Results:    < from: MR Pelvis Bony Only w/wo IV Cont (02.20.19 @ 14:12) >  mpression:    Large sacral decubitus ulcer. Acute osteomyelitis along the distal tip of   the S5 segment with periosseous edema extending along both the anterior   and posterior cortices and the sacral canal to the level of S4. No   drainable fluid collection. Cellulitis is noted. The coccygeal segments   are not present and may be surgically absent.    Large amount of stool within the rectum.        < end of copied text >

## 2019-02-27 NOTE — CONSULT NOTE ADULT - ASSESSMENT
85 Y old female with multiple medical problem, CVA, advanced dementia with ch sacral ulcer and osteomyelitis    pain control  Continue local wound care with [facility]  Wound care following, evaluate for VAC  Antibiotics per ID  Pressure ulcer precautions  nutritional support  Pt is not a candidate for flap repair will check with wound care if VAC can be placed.  medical care per primary service 85 Y old female with multiple medical problem, CVA, advanced dementia with ch sacral ulcer and osteomyelitis    pain control  Continue local wound care with [facility]  Wound care following, evaluate for VAC  Antibiotics per ID  Pressure ulcer precautions  nutritional support  Consider plastic surgery consult if need assessment for future flap.  Pt is not a candidate for flap repair will check with wound care if VAC can be placed.  medical care per primary service

## 2019-02-27 NOTE — PROGRESS NOTE ADULT - SUBJECTIVE AND OBJECTIVE BOX
Patient is a 85y old  Female who presents with a chief complaint of CVA (19 Feb 2019 10:17)      Date of service: 02-27-19 @ 11:57    Patient lying in bed; awake alert nonverbal        ROS unable to obtain secondary to patient medical condition     MEDICATIONS  (STANDING):  acetaminophen  Suppository .. 650 milliGRAM(s) Rectal once  ascorbic acid 500 milliGRAM(s) Oral daily  aspirin  chewable 81 milliGRAM(s) Oral daily  atorvastatin 40 milliGRAM(s) Oral at bedtime  calcium carbonate 1250 mG  + Vitamin D (OsCal 500 + D) 1 Tablet(s) Oral two times a day  desmopressin 0.01% Nasal 1 Spray(s) Nasal daily  dextrose 5%. 1000 milliLiter(s) (50 mL/Hr) IV Continuous <Continuous>  dextrose 50% Injectable 12.5 Gram(s) IV Push once  dextrose 50% Injectable 25 Gram(s) IV Push once  dextrose 50% Injectable 25 Gram(s) IV Push once  docusate sodium 100 milliGRAM(s) Oral two times a day  heparin  Injectable 5000 Unit(s) SubCutaneous every 8 hours  insulin glargine Injectable (LANTUS) 25 Unit(s) SubCutaneous at bedtime  insulin lispro (HumaLOG) corrective regimen sliding scale   SubCutaneous three times a day before meals  insulin lispro (HumaLOG) corrective regimen sliding scale   SubCutaneous at bedtime  melatonin 3 milliGRAM(s) Oral at bedtime  memantine 10 milliGRAM(s) Oral two times a day  metoprolol tartrate 12.5 milliGRAM(s) Oral two times a day  mirtazapine 15 milliGRAM(s) Oral at bedtime  piperacillin/tazobactam IVPB. 3.375 Gram(s) IV Intermittent every 8 hours  potassium chloride    Tablet ER 40 milliEquivalent(s) Oral once  sodium chloride 0.9%. 1000 milliLiter(s) (75 mL/Hr) IV Continuous <Continuous>  vancomycin  IVPB 750 milliGRAM(s) IV Intermittent every 12 hours  verapamil 80 milliGRAM(s) Oral daily  zinc sulfate 220 milliGRAM(s) Oral daily    MEDICATIONS  (PRN):  acetaminophen   Tablet .. 650 milliGRAM(s) Oral every 6 hours PRN Temp greater or equal to 38C (100.4F), Mild Pain (1 - 3)  dextrose 40% Gel 15 Gram(s) Oral once PRN Blood Glucose LESS THAN 70 milliGRAM(s)/deciliter  glucagon  Injectable 1 milliGRAM(s) IntraMuscular once PRN Glucose LESS THAN 70 milligrams/deciliter      Vital Signs Last 24 Hrs  T(C): 37.1 (27 Feb 2019 05:20), Max: 37.1 (27 Feb 2019 05:20)  T(F): 98.7 (27 Feb 2019 05:20), Max: 98.7 (27 Feb 2019 05:20)  HR: 83 (27 Feb 2019 05:20) (81 - 83)  BP: 139/57 (27 Feb 2019 05:20) (139/57 - 139/76)  BP(mean): --  RR: 18 (27 Feb 2019 05:20) (18 - 18)  SpO2: 99% (27 Feb 2019 05:20) (99% - 99%)    Physical Exam:            Physical Exam:    PE:    Constitutional: frail looking  HEENT: NC/AT, EOMI, PERRLA, conjunctivae clear; ears and nose atraumatic; pharynx clear  Neck: supple; thyroid not palpable  Back: no tenderness  Respiratory: respiratory effort normal  Cardiovascular: S1S2 regular, no murmurs  Abdomen: soft, not tender, not distended, positive BS; no liver or spleen organomegaly  Genitourinary: no suprapubic tenderness  Musculoskeletal: no muscle tenderness; left AKA  Neurological/ Psychiatric: nonverbal  Skin: no rashes; 3 cm ulcer over sacrum visible bone, foul smelling brown drainage    Labs: all available labs reviewed                         Labs:               Labs:                 Cultures:               Radiology: all available radiological tests reviewed    Advanced directives addressed: full resuscitation

## 2019-02-28 LAB
ANION GAP SERPL CALC-SCNC: 5 MMOL/L — SIGNIFICANT CHANGE UP (ref 5–17)
BUN SERPL-MCNC: 11 MG/DL — SIGNIFICANT CHANGE UP (ref 7–23)
CALCIUM SERPL-MCNC: 8.5 MG/DL — SIGNIFICANT CHANGE UP (ref 8.5–10.1)
CHLORIDE SERPL-SCNC: 114 MMOL/L — HIGH (ref 96–108)
CO2 SERPL-SCNC: 28 MMOL/L — SIGNIFICANT CHANGE UP (ref 22–31)
CREAT SERPL-MCNC: 1.11 MG/DL — SIGNIFICANT CHANGE UP (ref 0.5–1.3)
GLUCOSE SERPL-MCNC: 51 MG/DL — LOW (ref 70–99)
HCT VFR BLD CALC: 35.8 % — SIGNIFICANT CHANGE UP (ref 34.5–45)
HGB BLD-MCNC: 11.2 G/DL — LOW (ref 11.5–15.5)
MCHC RBC-ENTMCNC: 28.6 PG — SIGNIFICANT CHANGE UP (ref 27–34)
MCHC RBC-ENTMCNC: 31.3 GM/DL — LOW (ref 32–36)
MCV RBC AUTO: 91.3 FL — SIGNIFICANT CHANGE UP (ref 80–100)
NRBC # BLD: 0 /100 WBCS — SIGNIFICANT CHANGE UP (ref 0–0)
PLATELET # BLD AUTO: 341 K/UL — SIGNIFICANT CHANGE UP (ref 150–400)
POTASSIUM SERPL-MCNC: 3 MMOL/L — LOW (ref 3.5–5.3)
POTASSIUM SERPL-SCNC: 3 MMOL/L — LOW (ref 3.5–5.3)
RBC # BLD: 3.92 M/UL — SIGNIFICANT CHANGE UP (ref 3.8–5.2)
RBC # FLD: 15 % — HIGH (ref 10.3–14.5)
SODIUM SERPL-SCNC: 147 MMOL/L — HIGH (ref 135–145)
WBC # BLD: 12.45 K/UL — HIGH (ref 3.8–10.5)
WBC # FLD AUTO: 12.45 K/UL — HIGH (ref 3.8–10.5)

## 2019-02-28 RX ORDER — DEXTROSE 50 % IN WATER 50 %
SYRINGE (ML) INTRAVENOUS
Qty: 0 | Refills: 0 | Status: DISCONTINUED | OUTPATIENT
Start: 2019-02-28 | End: 2019-02-28

## 2019-02-28 RX ORDER — DEXTROSE 50 % IN WATER 50 %
50 SYRINGE (ML) INTRAVENOUS ONCE
Qty: 0 | Refills: 0 | Status: COMPLETED | OUTPATIENT
Start: 2019-02-28 | End: 2019-02-28

## 2019-02-28 RX ADMIN — HEPARIN SODIUM 5000 UNIT(S): 5000 INJECTION INTRAVENOUS; SUBCUTANEOUS at 06:44

## 2019-02-28 RX ADMIN — Medication 250 MILLIGRAM(S): at 18:47

## 2019-02-28 RX ADMIN — Medication 100 MILLIGRAM(S): at 18:45

## 2019-02-28 RX ADMIN — Medication 81 MILLIGRAM(S): at 11:27

## 2019-02-28 RX ADMIN — Medication 250 MILLIGRAM(S): at 06:45

## 2019-02-28 RX ADMIN — PIPERACILLIN AND TAZOBACTAM 25 GRAM(S): 4; .5 INJECTION, POWDER, LYOPHILIZED, FOR SOLUTION INTRAVENOUS at 23:24

## 2019-02-28 RX ADMIN — ATORVASTATIN CALCIUM 40 MILLIGRAM(S): 80 TABLET, FILM COATED ORAL at 23:25

## 2019-02-28 RX ADMIN — Medication 12.5 MILLIGRAM(S): at 18:45

## 2019-02-28 RX ADMIN — PIPERACILLIN AND TAZOBACTAM 25 GRAM(S): 4; .5 INJECTION, POWDER, LYOPHILIZED, FOR SOLUTION INTRAVENOUS at 13:51

## 2019-02-28 RX ADMIN — HEPARIN SODIUM 5000 UNIT(S): 5000 INJECTION INTRAVENOUS; SUBCUTANEOUS at 23:26

## 2019-02-28 RX ADMIN — Medication 500 MILLIGRAM(S): at 11:27

## 2019-02-28 RX ADMIN — MEMANTINE HYDROCHLORIDE 10 MILLIGRAM(S): 10 TABLET ORAL at 18:44

## 2019-02-28 RX ADMIN — Medication 1 TABLET(S): at 18:47

## 2019-02-28 RX ADMIN — ZINC SULFATE TAB 220 MG (50 MG ZINC EQUIVALENT) 220 MILLIGRAM(S): 220 (50 ZN) TAB at 11:27

## 2019-02-28 RX ADMIN — MIRTAZAPINE 15 MILLIGRAM(S): 45 TABLET, ORALLY DISINTEGRATING ORAL at 23:25

## 2019-02-28 RX ADMIN — Medication 3 MILLIGRAM(S): at 23:25

## 2019-02-28 RX ADMIN — PIPERACILLIN AND TAZOBACTAM 25 GRAM(S): 4; .5 INJECTION, POWDER, LYOPHILIZED, FOR SOLUTION INTRAVENOUS at 08:51

## 2019-02-28 RX ADMIN — Medication 50 MILLILITER(S): at 08:51

## 2019-02-28 RX ADMIN — DESMOPRESSIN ACETATE 1 SPRAY(S): 0.1 TABLET ORAL at 13:51

## 2019-02-28 RX ADMIN — HEPARIN SODIUM 5000 UNIT(S): 5000 INJECTION INTRAVENOUS; SUBCUTANEOUS at 13:50

## 2019-02-28 NOTE — PROGRESS NOTE ADULT - SUBJECTIVE AND OBJECTIVE BOX
Patient is a 85y old  Female who presents with a chief complaint of CVA (19 Feb 2019 10:17)      Date of service: 02-28-19 @ 12:33    Patient lying in bed; nonverbal        ROS unable to obtain secondary to patient medical condition     MEDICATIONS  (STANDING):  acetaminophen  Suppository .. 650 milliGRAM(s) Rectal once  ascorbic acid 500 milliGRAM(s) Oral daily  aspirin  chewable 81 milliGRAM(s) Oral daily  atorvastatin 40 milliGRAM(s) Oral at bedtime  calcium carbonate 1250 mG  + Vitamin D (OsCal 500 + D) 1 Tablet(s) Oral two times a day  desmopressin 0.01% Nasal 1 Spray(s) Nasal daily  dextrose 5%. 1000 milliLiter(s) (50 mL/Hr) IV Continuous <Continuous>  dextrose 50% Injectable 12.5 Gram(s) IV Push once  dextrose 50% Injectable 25 Gram(s) IV Push once  dextrose 50% Injectable 25 Gram(s) IV Push once  docusate sodium 100 milliGRAM(s) Oral two times a day  heparin  Injectable 5000 Unit(s) SubCutaneous every 8 hours  insulin glargine Injectable (LANTUS) 25 Unit(s) SubCutaneous at bedtime  insulin lispro (HumaLOG) corrective regimen sliding scale   SubCutaneous three times a day before meals  insulin lispro (HumaLOG) corrective regimen sliding scale   SubCutaneous at bedtime  melatonin 3 milliGRAM(s) Oral at bedtime  memantine 10 milliGRAM(s) Oral two times a day  metoprolol tartrate 12.5 milliGRAM(s) Oral two times a day  mirtazapine 15 milliGRAM(s) Oral at bedtime  piperacillin/tazobactam IVPB. 3.375 Gram(s) IV Intermittent every 8 hours  potassium chloride    Tablet ER 40 milliEquivalent(s) Oral once  sodium chloride 0.9%. 1000 milliLiter(s) (75 mL/Hr) IV Continuous <Continuous>  vancomycin  IVPB 750 milliGRAM(s) IV Intermittent every 12 hours  verapamil 80 milliGRAM(s) Oral daily  zinc sulfate 220 milliGRAM(s) Oral daily    MEDICATIONS  (PRN):  acetaminophen   Tablet .. 650 milliGRAM(s) Oral every 6 hours PRN Temp greater or equal to 38C (100.4F), Mild Pain (1 - 3)  dextrose 40% Gel 15 Gram(s) Oral once PRN Blood Glucose LESS THAN 70 milliGRAM(s)/deciliter  glucagon  Injectable 1 milliGRAM(s) IntraMuscular once PRN Glucose LESS THAN 70 milligrams/deciliter      Vital Signs Last 24 Hrs  T(C): 37.2 (28 Feb 2019 10:49), Max: 37.6 (28 Feb 2019 06:30)  T(F): 98.9 (28 Feb 2019 10:49), Max: 99.6 (28 Feb 2019 06:30)  HR: 87 (28 Feb 2019 10:49) (72 - 87)  BP: 147/80 (28 Feb 2019 10:49) (137/88 - 147/80)  BP(mean): --  RR: 18 (28 Feb 2019 10:49) (18 - 18)  SpO2: 100% (28 Feb 2019 10:49) (96% - 100%)    Physical Exam:          Physical Exam:    PE:    Constitutional: frail looking  HEENT: NC/AT, EOMI, PERRLA, conjunctivae clear; ears and nose atraumatic; pharynx clear  Neck: supple; thyroid not palpable  Back: no tenderness  Respiratory: respiratory effort normal  Cardiovascular: S1S2 regular, no murmurs  Abdomen: soft, not tender, not distended, positive BS; no liver or spleen organomegaly  Genitourinary: no suprapubic tenderness  Musculoskeletal: no muscle tenderness; left AKA  Neurological/ Psychiatric: nonverbal  Skin: no rashes; 3 cm ulcer over sacrum visible bone, foul smelling brown drainage    Labs: all available labs reviewed                         Labs:               Labs:                 Cultures:               Radiology: all available radiological tests reviewed    Advanced directives addressed: full resuscitation

## 2019-02-28 NOTE — PROGRESS NOTE ADULT - SUBJECTIVE AND OBJECTIVE BOX
CHIEF COMPLAINT:    SUBJECTIVE:     REVIEW OF SYSTEMS:    CONSTITUTIONAL: No weakness, fevers or chills  EYES/ENT: No visual changes;  No vertigo or throat pain   NECK: No pain or stiffness  RESPIRATORY: No cough, wheezing, hemoptysis; No shortness of breath  CARDIOVASCULAR: No chest pain or palpitations  GASTROINTESTINAL: No abdominal or epigastric pain. No nausea, vomiting, or hematemesis; No diarrhea or constipation. No melena or hematochezia.  GENITOURINARY: No dysuria, frequency or hematuria  NEUROLOGICAL: No numbness or weakness  SKIN: No itching, burning, rashes, or lesions   All other review of systems is negative unless indicated above    Vital Signs Last 24 Hrs  T(C): 37.2 (28 Feb 2019 10:49), Max: 37.6 (28 Feb 2019 06:30)  T(F): 98.9 (28 Feb 2019 10:49), Max: 99.6 (28 Feb 2019 06:30)  HR: 87 (28 Feb 2019 10:49) (72 - 87)  BP: 147/80 (28 Feb 2019 10:49) (137/88 - 147/80)  BP(mean): --  RR: 18 (28 Feb 2019 10:49) (18 - 18)  SpO2: 100% (28 Feb 2019 10:49) (96% - 100%)    I&O's Summary      CAPILLARY BLOOD GLUCOSE      POCT Blood Glucose.: 111 mg/dL (28 Feb 2019 11:21)  POCT Blood Glucose.: 207 mg/dL (28 Feb 2019 09:25)  POCT Blood Glucose.: 49 mg/dL (28 Feb 2019 08:17)  POCT Blood Glucose.: 42 mg/dL (28 Feb 2019 08:16)  POCT Blood Glucose.: 120 mg/dL (27 Feb 2019 22:32)  POCT Blood Glucose.: 102 mg/dL (27 Feb 2019 17:47)      PHYSICAL EXAM:    Constitutional: NAD, awake and alert, well-developed  HEENT: PERR, EOMI, Normal Hearing, MMM  Neck: Soft and supple, No LAD, No JVD  Respiratory: Breath sounds are clear bilaterally, No wheezing, rales or rhonchi  Cardiovascular: S1 and S2, regular rate and rhythm, no Murmurs, gallops or rubs  Gastrointestinal: Bowel Sounds present, soft, nontender, nondistended, no guarding, no rebound  Extremities: No peripheral edema  Vascular: 2+ peripheral pulses  Neurological: A/O x 3, no focal deficits  Musculoskeletal: 5/5 strength b/l upper and lower extremities  Skin: No rashes    MEDICATIONS:  MEDICATIONS  (STANDING):  acetaminophen  Suppository .. 650 milliGRAM(s) Rectal once  ascorbic acid 500 milliGRAM(s) Oral daily  aspirin  chewable 81 milliGRAM(s) Oral daily  atorvastatin 40 milliGRAM(s) Oral at bedtime  calcium carbonate 1250 mG  + Vitamin D (OsCal 500 + D) 1 Tablet(s) Oral two times a day  desmopressin 0.01% Nasal 1 Spray(s) Nasal daily  dextrose 5%. 1000 milliLiter(s) (50 mL/Hr) IV Continuous <Continuous>  dextrose 50% Injectable 12.5 Gram(s) IV Push once  dextrose 50% Injectable 25 Gram(s) IV Push once  dextrose 50% Injectable 25 Gram(s) IV Push once  docusate sodium 100 milliGRAM(s) Oral two times a day  heparin  Injectable 5000 Unit(s) SubCutaneous every 8 hours  insulin glargine Injectable (LANTUS) 25 Unit(s) SubCutaneous at bedtime  insulin lispro (HumaLOG) corrective regimen sliding scale   SubCutaneous three times a day before meals  insulin lispro (HumaLOG) corrective regimen sliding scale   SubCutaneous at bedtime  melatonin 3 milliGRAM(s) Oral at bedtime  memantine 10 milliGRAM(s) Oral two times a day  metoprolol tartrate 12.5 milliGRAM(s) Oral two times a day  mirtazapine 15 milliGRAM(s) Oral at bedtime  piperacillin/tazobactam IVPB. 3.375 Gram(s) IV Intermittent every 8 hours  potassium chloride    Tablet ER 40 milliEquivalent(s) Oral once  sodium chloride 0.9%. 1000 milliLiter(s) (75 mL/Hr) IV Continuous <Continuous>  vancomycin  IVPB 750 milliGRAM(s) IV Intermittent every 12 hours  verapamil 80 milliGRAM(s) Oral daily  zinc sulfate 220 milliGRAM(s) Oral daily      LABS: All Labs Reviewed:                        11.2   12.45 )-----------( 341      ( 28 Feb 2019 05:58 )             35.8     02-28    147<H>  |  114<H>  |  11  ----------------------------<  51<L>  3.0<L>   |  28  |  1.11    Ca    8.5      28 Feb 2019 05:58              Assessment and Plan:       86 yo female with h/o DM2, PVD s/p Left  AKA, dementia (non-verbal at baseline), Afib (not on AC), Major depression, HTN admitted on 2/18 from SNF for evaluation of left facial droop. Has been hypoglycemic at times here and asked to evaluate.       # Acute/Subacute CVA: with some LUE weakness  # Hx of Left AKA  - Patient was unable to tolerate MRI of the head/neck, Hence repeat CT of head after 48 hours did not show any changes  - ASA, Lipitor  -Cardiology eval appreciated, high CHADS score for afib, should be AC, d/w  who discussed with family and they want to wait for AC at this time  -speech and swallow/dysphagia eval appreciated, puree diet  -fall px, aspiration px  -BP control    * Osteomyelitis of the sacral area  -sacral decubitus ulcer seen at bedside with nursing. oozing yellow drainage with dead tissue sloughing off near the spine. Unstageable, able to see the bone.  -vanco and zosyn  -ID Cx appreciated, d/w   -f/u all cultures, blood/urine  - MRI of Pelvis with OM  -tylenol prn fever  -surgical consult apprecaited, recco no surgical intervention. Recco c/w wound care and wound vac possibly. would infection needs to be treated prior any talks of Flap.     *AFib, not on AC  - discussed with daughter in detail over the phone and family in the room, r/b of AC, they want to still think about it.   - c/w metoprolol, verapamil  - rate controlled  -cardiology input appreciated      *PVD s/p left AKA  -fall px  -c/w ASA    *HTN:  -c/w metoprolol, verapamil    *Dementia  -supportive care  -aspiration/fall px    *Depression  -mood appears stable    *DVT px:  -Heparin sq    * Hypoglycemia: ? even on D5  - lantus dose reduced to 25 units  - endo evaluation appreciated.       *Goals of care discussion:  Met family in person,  They want what they feel is in her best interest and want to "give her a fighting chance". Discussed with them her overall poor prognosis given bedbound, and left leg amputee, with progressive dementia. Discussed surgical options and negatives and pros of surgery and not doing surgery. They wanted surgical consult. Surgery states no surgery at this time, c/w wound care.    Discussed case with surgical team whom evaluated patient. patient is not a candidate for Flap palcement as the wound is still infected. Patient will need wound vac for and clearing of the wound, which  is not likley in this bed bound patient with dementia. She has limited mobility and also can not move herself around in anyway. CHIEF COMPLAINT/Diagnosis: osteomyelitis of spine/ sacral unstageable decubitus    SUBJECTIVE: appear comfortable; patient with dementia    REVIEW OF SYSTEMS:    unattainable secondary to dementia    Vital Signs Last 24 Hrs  T(C): 37.2 (28 Feb 2019 10:49), Max: 37.6 (28 Feb 2019 06:30)  T(F): 98.9 (28 Feb 2019 10:49), Max: 99.6 (28 Feb 2019 06:30)  HR: 87 (28 Feb 2019 10:49) (72 - 87)  BP: 147/80 (28 Feb 2019 10:49) (137/88 - 147/80)  BP(mean): --  RR: 18 (28 Feb 2019 10:49) (18 - 18)  SpO2: 100% (28 Feb 2019 10:49) (96% - 100%)    I&O's Summary      CAPILLARY BLOOD GLUCOSE      POCT Blood Glucose.: 111 mg/dL (28 Feb 2019 11:21)  POCT Blood Glucose.: 207 mg/dL (28 Feb 2019 09:25)  POCT Blood Glucose.: 49 mg/dL (28 Feb 2019 08:17)  POCT Blood Glucose.: 42 mg/dL (28 Feb 2019 08:16)  POCT Blood Glucose.: 120 mg/dL (27 Feb 2019 22:32)  POCT Blood Glucose.: 102 mg/dL (27 Feb 2019 17:47)      PHYSICAL EXAM:    Constitutional: NAD, awake and alert, well-developed  HEENT: PERR, EOMI, Normal Hearing, MMM  Neck: Soft and supple, No LAD, No JVD  Respiratory: Breath sounds are clear bilaterally, No wheezing, rales or rhonchi  Cardiovascular: S1 and S2, regular rate and rhythm, no Murmurs, gallops or rubs  Gastrointestinal: Bowel Sounds present, soft, nontender, nondistended, no guarding, no rebound  Extremities: No peripheral edema  Vascular: 2+ peripheral pulses  Neurological: A/O x 3, no focal deficits  Musculoskeletal: 5/5 strength b/l upper and lower extremities  Skin: No rashes    MEDICATIONS:  MEDICATIONS  (STANDING):  acetaminophen  Suppository .. 650 milliGRAM(s) Rectal once  ascorbic acid 500 milliGRAM(s) Oral daily  aspirin  chewable 81 milliGRAM(s) Oral daily  atorvastatin 40 milliGRAM(s) Oral at bedtime  calcium carbonate 1250 mG  + Vitamin D (OsCal 500 + D) 1 Tablet(s) Oral two times a day  desmopressin 0.01% Nasal 1 Spray(s) Nasal daily  dextrose 5%. 1000 milliLiter(s) (50 mL/Hr) IV Continuous <Continuous>  dextrose 50% Injectable 12.5 Gram(s) IV Push once  dextrose 50% Injectable 25 Gram(s) IV Push once  dextrose 50% Injectable 25 Gram(s) IV Push once  docusate sodium 100 milliGRAM(s) Oral two times a day  heparin  Injectable 5000 Unit(s) SubCutaneous every 8 hours  insulin glargine Injectable (LANTUS) 25 Unit(s) SubCutaneous at bedtime  insulin lispro (HumaLOG) corrective regimen sliding scale   SubCutaneous three times a day before meals  insulin lispro (HumaLOG) corrective regimen sliding scale   SubCutaneous at bedtime  melatonin 3 milliGRAM(s) Oral at bedtime  memantine 10 milliGRAM(s) Oral two times a day  metoprolol tartrate 12.5 milliGRAM(s) Oral two times a day  mirtazapine 15 milliGRAM(s) Oral at bedtime  piperacillin/tazobactam IVPB. 3.375 Gram(s) IV Intermittent every 8 hours  potassium chloride    Tablet ER 40 milliEquivalent(s) Oral once  sodium chloride 0.9%. 1000 milliLiter(s) (75 mL/Hr) IV Continuous <Continuous>  vancomycin  IVPB 750 milliGRAM(s) IV Intermittent every 12 hours  verapamil 80 milliGRAM(s) Oral daily  zinc sulfate 220 milliGRAM(s) Oral daily      LABS: All Labs Reviewed:                        11.2   12.45 )-----------( 341      ( 28 Feb 2019 05:58 )             35.8     02-28    147<H>  |  114<H>  |  11  ----------------------------<  51<L>  3.0<L>   |  28  |  1.11    Ca    8.5      28 Feb 2019 05:58              Assessment and Plan:       86 yo female with h/o DM2, PVD s/p Left  AKA, dementia (non-verbal at baseline), Afib (not on AC), Major depression, HTN admitted on 2/18 from SNF for evaluation of left facial droop. Has been hypoglycemic at times here and asked to evaluate.       # Acute/Subacute CVA: with some LUE weakness  # Hx of Left AKA  - Patient was unable to tolerate MRI of the head/neck, Hence repeat CT of head after 48 hours did not show any changes  - ASA, Lipitor  -Cardiology eval appreciated, high CHADS score for afib, should be AC, d/w  who discussed with family and they want to wait for AC at this time  -speech and swallow/dysphagia eval appreciated, puree diet  -fall px, aspiration px  -BP control    * Osteomyelitis of the sacral area  -sacral decubitus ulcer seen at bedside with nursing. oozing yellow drainage with dead tissue sloughing off near the spine. Unstageable, able to see the bone.  -vanco and zosyn  -ID Cx appreciated, d/w   -f/u all cultures, blood/urine  - MRI of Pelvis with OM  -tylenol prn fever  -surgical consult apprecaited, recco no surgical intervention. Recco c/w wound care and wound vac possibly. would infection needs to be treated prior any talks of Flap.     *AFib, not on AC  - discussed with daughter in detail over the phone and family in the room, r/b of AC, they want to still think about it.   - c/w metoprolol, verapamil  - rate controlled  -cardiology input appreciated      *PVD s/p left AKA  -fall px  -c/w ASA    *HTN:  -c/w metoprolol, verapamil    *Dementia  -supportive care  -aspiration/fall px    *Depression  -mood appears stable    *DVT px:  -Heparin sq    * Hypoglycemia: ? even on D5  - lantus dose reduced to 25 units  - endo evaluation appreciated.       *Goals of care discussion:  Met family in person,  They want what they feel is in her best interest and want to "give her a fighting chance". Discussed with them her overall poor prognosis given bedbound, and left leg amputee, with progressive dementia. Discussed surgical options and negatives and pros of surgery and not doing surgery. They wanted surgical consult. Surgery states no surgery at this time, c/w wound care.    Discussed case with surgical team whom evaluated patient. patient is not a candidate for Flap palcement as the wound is still infected. Patient will need wound vac for and clearing of the wound,  She has limited mobility and also can not move herself around in anyway. very poor prognosis for wound healing.   Family still undecided about what to do, but appears they are leaning towards aggressive intervention.     Plan: patient with infected sacral decubitus. needs iv abx long term and aggressive wound care, maybe wound vac. verypoor prognosis, aggressive dementia. Family in talks about what they want to do, looks like they want all aggressive care including long term iv abx and surgery if needed. Surgical team to talk to daughter Deepti, today.

## 2019-02-28 NOTE — PROGRESS NOTE ADULT - SUBJECTIVE AND OBJECTIVE BOX
HPI: Pt seen and examined this am in follow up for sx and GOC, RN students at bedside feeding pt. She is able to say good morning, but nothing further. Appears comfortable. RN students note episode of hypoglycemia and rapid afib briefly this am, but pt stable since then with no signs of discomfort that they were aware of. They also noted hearing that family will make a decision today. Plan to touch base with primary team and family.       PAIN: no nonverbal signs of pain  DYSPNEA: no nonverbal signs of dyspnea      ROS:  Unable to gather 2/2 to advanced dementia      PHYSICAL EXAM:    Vital Signs Last 24 Hrs  T(C): 37.6 (28 Feb 2019 06:30), Max: 37.6 (28 Feb 2019 06:30)  T(F): 99.6 (28 Feb 2019 06:30), Max: 99.6 (28 Feb 2019 06:30)  HR: 72 (28 Feb 2019 06:30) (72 - 83)  BP: 141/44 (28 Feb 2019 06:30) (137/88 - 141/44)  BP(mean): --  RR: 18 (28 Feb 2019 06:30) (18 - 18)  SpO2: 97% (28 Feb 2019 06:30) (96% - 99%)  Daily     Daily     PPSV2:  20-30 %  FAST: 7C    General: Older female positioned upright in bed, awake, alert, being fed and accepting food, NAD  Mental Status: alert, but unable to orient  HEENT: PERRLA  Lungs: CTA b/l  Cardiac: S1,S2, RRR  GI:soft, NT, ND + bowel sounds  Ext: L BKA present, contracted LUE  Neuro: unable to fully assess responds to touch     LABS:                        11.2   12.45 )-----------( 341      ( 28 Feb 2019 05:58 )             35.8     02-28    147<H>  |  114<H>  |  11  ----------------------------<  51<L>  3.0<L>   |  28  |  1.11    Ca    8.5      28 Feb 2019 05:58        Albumin: Albumin, Serum: 1.7 g/dL (02-23 @ 06:05)      Allergies    iodine (Other (Unknown))  sulfa drugs (Other (Unknown))    Intolerances      MEDICATIONS  (STANDING):  acetaminophen  Suppository .. 650 milliGRAM(s) Rectal once  ascorbic acid 500 milliGRAM(s) Oral daily  aspirin  chewable 81 milliGRAM(s) Oral daily  atorvastatin 40 milliGRAM(s) Oral at bedtime  calcium carbonate 1250 mG  + Vitamin D (OsCal 500 + D) 1 Tablet(s) Oral two times a day  desmopressin 0.01% Nasal 1 Spray(s) Nasal daily  dextrose 5%. 1000 milliLiter(s) (50 mL/Hr) IV Continuous <Continuous>  dextrose 50% Injectable 12.5 Gram(s) IV Push once  dextrose 50% Injectable 25 Gram(s) IV Push once  dextrose 50% Injectable 25 Gram(s) IV Push once  docusate sodium 100 milliGRAM(s) Oral two times a day  heparin  Injectable 5000 Unit(s) SubCutaneous every 8 hours  insulin glargine Injectable (LANTUS) 25 Unit(s) SubCutaneous at bedtime  insulin lispro (HumaLOG) corrective regimen sliding scale   SubCutaneous three times a day before meals  insulin lispro (HumaLOG) corrective regimen sliding scale   SubCutaneous at bedtime  melatonin 3 milliGRAM(s) Oral at bedtime  memantine 10 milliGRAM(s) Oral two times a day  metoprolol tartrate 12.5 milliGRAM(s) Oral two times a day  mirtazapine 15 milliGRAM(s) Oral at bedtime  piperacillin/tazobactam IVPB. 3.375 Gram(s) IV Intermittent every 8 hours  potassium chloride    Tablet ER 40 milliEquivalent(s) Oral once  sodium chloride 0.9%. 1000 milliLiter(s) (75 mL/Hr) IV Continuous <Continuous>  vancomycin  IVPB 750 milliGRAM(s) IV Intermittent every 12 hours  verapamil 80 milliGRAM(s) Oral daily  zinc sulfate 220 milliGRAM(s) Oral daily    MEDICATIONS  (PRN):  acetaminophen   Tablet .. 650 milliGRAM(s) Oral every 6 hours PRN Temp greater or equal to 38C (100.4F), Mild Pain (1 - 3)  dextrose 40% Gel 15 Gram(s) Oral once PRN Blood Glucose LESS THAN 70 milliGRAM(s)/deciliter  glucagon  Injectable 1 milliGRAM(s) IntraMuscular once PRN Glucose LESS THAN 70 milligrams/deciliter

## 2019-03-01 LAB
ANION GAP SERPL CALC-SCNC: 6 MMOL/L — SIGNIFICANT CHANGE UP (ref 5–17)
BUN SERPL-MCNC: 10 MG/DL — SIGNIFICANT CHANGE UP (ref 7–23)
CALCIUM SERPL-MCNC: 8.3 MG/DL — LOW (ref 8.5–10.1)
CHLORIDE SERPL-SCNC: 115 MMOL/L — HIGH (ref 96–108)
CO2 SERPL-SCNC: 29 MMOL/L — SIGNIFICANT CHANGE UP (ref 22–31)
CREAT SERPL-MCNC: 1.22 MG/DL — SIGNIFICANT CHANGE UP (ref 0.5–1.3)
GLUCOSE SERPL-MCNC: 84 MG/DL — SIGNIFICANT CHANGE UP (ref 70–99)
HCT VFR BLD CALC: 32.4 % — LOW (ref 34.5–45)
HGB BLD-MCNC: 10 G/DL — LOW (ref 11.5–15.5)
MCHC RBC-ENTMCNC: 28 PG — SIGNIFICANT CHANGE UP (ref 27–34)
MCHC RBC-ENTMCNC: 30.9 GM/DL — LOW (ref 32–36)
MCV RBC AUTO: 90.8 FL — SIGNIFICANT CHANGE UP (ref 80–100)
NRBC # BLD: 0 /100 WBCS — SIGNIFICANT CHANGE UP (ref 0–0)
PLATELET # BLD AUTO: 282 K/UL — SIGNIFICANT CHANGE UP (ref 150–400)
POTASSIUM SERPL-MCNC: 3.1 MMOL/L — LOW (ref 3.5–5.3)
POTASSIUM SERPL-SCNC: 3.1 MMOL/L — LOW (ref 3.5–5.3)
RBC # BLD: 3.57 M/UL — LOW (ref 3.8–5.2)
RBC # FLD: 15.4 % — HIGH (ref 10.3–14.5)
SODIUM SERPL-SCNC: 150 MMOL/L — HIGH (ref 135–145)
WBC # BLD: 10.35 K/UL — SIGNIFICANT CHANGE UP (ref 3.8–10.5)
WBC # FLD AUTO: 10.35 K/UL — SIGNIFICANT CHANGE UP (ref 3.8–10.5)

## 2019-03-01 RX ORDER — DEXTROSE MONOHYDRATE, SODIUM CHLORIDE, AND POTASSIUM CHLORIDE 50; .745; 4.5 G/1000ML; G/1000ML; G/1000ML
1000 INJECTION, SOLUTION INTRAVENOUS
Qty: 0 | Refills: 0 | Status: DISCONTINUED | OUTPATIENT
Start: 2019-03-01 | End: 2019-03-02

## 2019-03-01 RX ADMIN — PIPERACILLIN AND TAZOBACTAM 25 GRAM(S): 4; .5 INJECTION, POWDER, LYOPHILIZED, FOR SOLUTION INTRAVENOUS at 08:25

## 2019-03-01 RX ADMIN — Medication 12.5 MILLIGRAM(S): at 18:07

## 2019-03-01 RX ADMIN — Medication 6: at 18:07

## 2019-03-01 RX ADMIN — Medication 1 TABLET(S): at 06:41

## 2019-03-01 RX ADMIN — HEPARIN SODIUM 5000 UNIT(S): 5000 INJECTION INTRAVENOUS; SUBCUTANEOUS at 13:09

## 2019-03-01 RX ADMIN — MIRTAZAPINE 15 MILLIGRAM(S): 45 TABLET, ORALLY DISINTEGRATING ORAL at 22:44

## 2019-03-01 RX ADMIN — ZINC SULFATE TAB 220 MG (50 MG ZINC EQUIVALENT) 220 MILLIGRAM(S): 220 (50 ZN) TAB at 13:03

## 2019-03-01 RX ADMIN — Medication 12.5 MILLIGRAM(S): at 06:42

## 2019-03-01 RX ADMIN — Medication 250 MILLIGRAM(S): at 06:46

## 2019-03-01 RX ADMIN — Medication 500 MILLIGRAM(S): at 13:03

## 2019-03-01 RX ADMIN — ATORVASTATIN CALCIUM 40 MILLIGRAM(S): 80 TABLET, FILM COATED ORAL at 22:44

## 2019-03-01 RX ADMIN — Medication 80 MILLIGRAM(S): at 06:42

## 2019-03-01 RX ADMIN — Medication 100 MILLIGRAM(S): at 18:07

## 2019-03-01 RX ADMIN — Medication 81 MILLIGRAM(S): at 13:03

## 2019-03-01 RX ADMIN — HEPARIN SODIUM 5000 UNIT(S): 5000 INJECTION INTRAVENOUS; SUBCUTANEOUS at 22:44

## 2019-03-01 RX ADMIN — HEPARIN SODIUM 5000 UNIT(S): 5000 INJECTION INTRAVENOUS; SUBCUTANEOUS at 06:42

## 2019-03-01 RX ADMIN — PIPERACILLIN AND TAZOBACTAM 25 GRAM(S): 4; .5 INJECTION, POWDER, LYOPHILIZED, FOR SOLUTION INTRAVENOUS at 13:27

## 2019-03-01 RX ADMIN — MEMANTINE HYDROCHLORIDE 10 MILLIGRAM(S): 10 TABLET ORAL at 18:07

## 2019-03-01 RX ADMIN — Medication 1 TABLET(S): at 18:03

## 2019-03-01 RX ADMIN — Medication 250 MILLIGRAM(S): at 18:08

## 2019-03-01 RX ADMIN — PIPERACILLIN AND TAZOBACTAM 25 GRAM(S): 4; .5 INJECTION, POWDER, LYOPHILIZED, FOR SOLUTION INTRAVENOUS at 22:45

## 2019-03-01 RX ADMIN — MEMANTINE HYDROCHLORIDE 10 MILLIGRAM(S): 10 TABLET ORAL at 06:42

## 2019-03-01 RX ADMIN — Medication 3 MILLIGRAM(S): at 22:44

## 2019-03-01 RX ADMIN — DEXTROSE MONOHYDRATE, SODIUM CHLORIDE, AND POTASSIUM CHLORIDE 60 MILLILITER(S): 50; .745; 4.5 INJECTION, SOLUTION INTRAVENOUS at 18:01

## 2019-03-01 NOTE — CHART NOTE - NSCHARTNOTEFT_GEN_A_CORE
This SW left message for pts daughter Deepti [phone] to follow up. Awaiting call back. Our team will continue to follow.

## 2019-03-01 NOTE — PROGRESS NOTE ADULT - SUBJECTIVE AND OBJECTIVE BOX
Patient is a 85y old  Female who presents with a chief complaint of CVA (19 Feb 2019 10:17)          Date of service: 03-01-19 @ 10:09    Patient lying in bed  Afebrile, nonverbal        ROS: unable to obtain secondary to patient medical condition     MEDICATIONS  (STANDING):  acetaminophen  Suppository .. 650 milliGRAM(s) Rectal once  ascorbic acid 500 milliGRAM(s) Oral daily  aspirin  chewable 81 milliGRAM(s) Oral daily  atorvastatin 40 milliGRAM(s) Oral at bedtime  calcium carbonate 1250 mG  + Vitamin D (OsCal 500 + D) 1 Tablet(s) Oral two times a day  desmopressin 0.01% Nasal 1 Spray(s) Nasal daily  dextrose 5%. 1000 milliLiter(s) (50 mL/Hr) IV Continuous <Continuous>  dextrose 50% Injectable 12.5 Gram(s) IV Push once  dextrose 50% Injectable 25 Gram(s) IV Push once  dextrose 50% Injectable 25 Gram(s) IV Push once  docusate sodium 100 milliGRAM(s) Oral two times a day  heparin  Injectable 5000 Unit(s) SubCutaneous every 8 hours  insulin glargine Injectable (LANTUS) 25 Unit(s) SubCutaneous at bedtime  insulin lispro (HumaLOG) corrective regimen sliding scale   SubCutaneous three times a day before meals  insulin lispro (HumaLOG) corrective regimen sliding scale   SubCutaneous at bedtime  melatonin 3 milliGRAM(s) Oral at bedtime  memantine 10 milliGRAM(s) Oral two times a day  metoprolol tartrate 12.5 milliGRAM(s) Oral two times a day  mirtazapine 15 milliGRAM(s) Oral at bedtime  piperacillin/tazobactam IVPB. 3.375 Gram(s) IV Intermittent every 8 hours  potassium chloride    Tablet ER 40 milliEquivalent(s) Oral once  sodium chloride 0.9%. 1000 milliLiter(s) (75 mL/Hr) IV Continuous <Continuous>  vancomycin  IVPB 750 milliGRAM(s) IV Intermittent every 12 hours  verapamil 80 milliGRAM(s) Oral daily  zinc sulfate 220 milliGRAM(s) Oral daily    MEDICATIONS  (PRN):  acetaminophen   Tablet .. 650 milliGRAM(s) Oral every 6 hours PRN Temp greater or equal to 38C (100.4F), Mild Pain (1 - 3)  dextrose 40% Gel 15 Gram(s) Oral once PRN Blood Glucose LESS THAN 70 milliGRAM(s)/deciliter  glucagon  Injectable 1 milliGRAM(s) IntraMuscular once PRN Glucose LESS THAN 70 milligrams/deciliter      Vital Signs Last 24 Hrs  T(C): 36.9 (01 Mar 2019 06:04), Max: 37.2 (28 Feb 2019 10:49)  T(F): 98.5 (01 Mar 2019 06:04), Max: 98.9 (28 Feb 2019 10:49)  HR: 73 (01 Mar 2019 06:04) (73 - 87)  BP: 146/79 (01 Mar 2019 06:04) (129/72 - 147/80)  BP(mean): --  RR: 18 (01 Mar 2019 06:04) (18 - 18)  SpO2: 100% (01 Mar 2019 06:04) (100% - 100%)    Physical Exam:      Physical Exam:    PE:    Constitutional: frail looking  HEENT: NC/AT, EOMI, PERRLA, conjunctivae clear; ears and nose atraumatic; pharynx clear  Neck: supple; thyroid not palpable  Back: no tenderness  Respiratory: respiratory effort normal  Cardiovascular: S1S2 regular, no murmurs  Abdomen: soft, not tender, not distended, positive BS; no liver or spleen organomegaly  Genitourinary: no suprapubic tenderness  Musculoskeletal: no muscle tenderness; left AKA  Neurological/ Psychiatric: nonverbal  Skin: no rashes; 3 cm ulcer over sacrum visible bone, foul smelling brown drainage    Labs: all available labs reviewed              Labs:                        10.0   10.35 )-----------( 282      ( 01 Mar 2019 05:46 )             32.4     03-01    150<H>  |  115<H>  |  10  ----------------------------<  84  3.1<L>   |  29  |  1.22    Ca    8.3<L>      01 Mar 2019 05:46             Cultures:                 Radiology: all available radiological tests reviewed    Advanced directives addressed: full resuscitation

## 2019-03-01 NOTE — PROGRESS NOTE ADULT - SUBJECTIVE AND OBJECTIVE BOX
cc: left facial droop  hpi: 85y female w/ PMH DM2, PVD s/p Left  AKA, dementia (non-verbal at baseline), Afib (not on AC), Major depression, HTN admitted on 2/18 from SNF for evaluation of left facial droop.  Chart reviewed- pt seen early this morning.  Mumbled a few words. Being fed breakfast.     ros- limited due to dementia    Vital Signs Last 24 Hrs  T(C): 38 (01 Mar 2019 13:35), Max: 38 (01 Mar 2019 13:35)  T(F): 100.4 (01 Mar 2019 13:35), Max: 100.4 (01 Mar 2019 13:35)  HR: 83 (01 Mar 2019 10:53) (73 - 83)  BP: 112/77 (01 Mar 2019 10:53) (112/77 - 146/79)  BP(mean): --  RR: 18 (01 Mar 2019 10:53) (18 - 18)  SpO2: 100% (01 Mar 2019 10:53) (100% - 100%)      PHYSICAL EXAM:  General: chronic ill appearing elderly female in NAD, comfortable  Neuro: nonverbal  HEENT: NCAT,   Neck: Soft and supple  Respiratory: CTA b/l, no w/r/r  Cardiovascular: S1 and S2, RRR  Gastrointestinal: +BS, soft, NTND  Extremities: No edema  Vascular/MSK: left AKA,    skin: sacral decub.        LABS: All Labs Reviewed:                        10.0   10.35 )-----------( 282      ( 01 Mar 2019 05:46 )             32.4     03-01    150<H>  |  115<H>  |  10  ----------------------------<  84  3.1<L>   |  29  |  1.22    Ca    8.3<L>      01 Mar 2019 05:46        MEDICATIONS  (STANDING):  acetaminophen  Suppository .. 650 milliGRAM(s) Rectal once  ascorbic acid 500 milliGRAM(s) Oral daily  aspirin  chewable 81 milliGRAM(s) Oral daily  atorvastatin 40 milliGRAM(s) Oral at bedtime  calcium carbonate 1250 mG  + Vitamin D (OsCal 500 + D) 1 Tablet(s) Oral two times a day  desmopressin 0.01% Nasal 1 Spray(s) Nasal daily  dextrose 5%. 1000 milliLiter(s) (50 mL/Hr) IV Continuous <Continuous>  dextrose 50% Injectable 12.5 Gram(s) IV Push once  dextrose 50% Injectable 25 Gram(s) IV Push once  dextrose 50% Injectable 25 Gram(s) IV Push once  docusate sodium 100 milliGRAM(s) Oral two times a day  heparin  Injectable 5000 Unit(s) SubCutaneous every 8 hours  insulin lispro (HumaLOG) corrective regimen sliding scale   SubCutaneous three times a day before meals  insulin lispro (HumaLOG) corrective regimen sliding scale   SubCutaneous at bedtime  melatonin 3 milliGRAM(s) Oral at bedtime  memantine 10 milliGRAM(s) Oral two times a day  metoprolol tartrate 12.5 milliGRAM(s) Oral two times a day  mirtazapine 15 milliGRAM(s) Oral at bedtime  piperacillin/tazobactam IVPB. 3.375 Gram(s) IV Intermittent every 8 hours  potassium chloride    Tablet ER 40 milliEquivalent(s) Oral once  vancomycin  IVPB 750 milliGRAM(s) IV Intermittent every 12 hours  verapamil 80 milliGRAM(s) Oral daily  zinc sulfate 220 milliGRAM(s) Oral daily    MEDICATIONS  (PRN):  acetaminophen   Tablet .. 650 milliGRAM(s) Oral every 6 hours PRN Temp greater or equal to 38C (100.4F), Mild Pain (1 - 3)  dextrose 40% Gel 15 Gram(s) Oral once PRN Blood Glucose LESS THAN 70 milliGRAM(s)/deciliter  glucagon  Injectable 1 milliGRAM(s) IntraMuscular once PRN Glucose LESS THAN 70 milligrams/deciliter        ASSESSMENT and PLAN:    85y female w/     1. acute/subacute CVA  - residual weakness  - unable to tolerate MRI  - repeat CT in 48hrs no changes  - aspirin, lipitor  - Cardio f/u appreciated- rec AC for Afib but family uncertain  - -speech and swallow/dysphagia eval appreciated, puree diet    -fall px, aspiration px  -BP control    2. sacral decubitus ulcer, sacral Osteomyelitis  - MRI +  - vanco, zosyn renally dose  - ID f/u appreciated   - Surgical f/u appreciated- no intervention.  Local care. Possible wound vac    3. afib  - BB, CCb  - Cardio f/u appreciated- rec AC but family doesn't want at this time    4. PAD s/p left aka  - aspirin, statin    5. dementia, acute on chronic metabolic encephalopathy  - tx above   - supportive care    6.  T2D, hypoglycemia  Hemoglobin A1C, Whole Blood: 8.7: Method: Immunoassay  - stop lantus b/c epidoses hypoglycemia early am and pt not eating much  - Endo f/u appreciated  - ISS, fs    7. hypernatremia, hypokalemia  - stop NS.  encourage oral intake  change to 1/2 +KCl    dvt px  heparin sc    Advance directives- family undecided.  Overall poor prognosis.  Paliative f/u appreciated.

## 2019-03-02 LAB
ADD ON TEST-SPECIMEN IN LAB: SIGNIFICANT CHANGE UP
ANION GAP SERPL CALC-SCNC: 8 MMOL/L — SIGNIFICANT CHANGE UP (ref 5–17)
BUN SERPL-MCNC: 15 MG/DL — SIGNIFICANT CHANGE UP (ref 7–23)
CALCIUM SERPL-MCNC: 8.6 MG/DL — SIGNIFICANT CHANGE UP (ref 8.5–10.1)
CHLORIDE SERPL-SCNC: 112 MMOL/L — HIGH (ref 96–108)
CO2 SERPL-SCNC: 30 MMOL/L — SIGNIFICANT CHANGE UP (ref 22–31)
CREAT SERPL-MCNC: 1.44 MG/DL — HIGH (ref 0.5–1.3)
GLUCOSE SERPL-MCNC: 227 MG/DL — HIGH (ref 70–99)
POTASSIUM SERPL-MCNC: 3.4 MMOL/L — LOW (ref 3.5–5.3)
POTASSIUM SERPL-SCNC: 3.4 MMOL/L — LOW (ref 3.5–5.3)
SODIUM SERPL-SCNC: 150 MMOL/L — HIGH (ref 135–145)
VANCOMYCIN TROUGH SERPL-MCNC: 37.6 UG/ML — CRITICAL HIGH (ref 10–20)

## 2019-03-02 RX ORDER — SODIUM CHLORIDE 9 MG/ML
1000 INJECTION, SOLUTION INTRAVENOUS
Qty: 0 | Refills: 0 | Status: DISCONTINUED | OUTPATIENT
Start: 2019-03-02 | End: 2019-03-04

## 2019-03-02 RX ORDER — POTASSIUM CHLORIDE 20 MEQ
10 PACKET (EA) ORAL ONCE
Qty: 0 | Refills: 0 | Status: COMPLETED | OUTPATIENT
Start: 2019-03-02 | End: 2019-03-02

## 2019-03-02 RX ORDER — VANCOMYCIN HCL 1 G
1000 VIAL (EA) INTRAVENOUS EVERY 24 HOURS
Qty: 0 | Refills: 0 | Status: DISCONTINUED | OUTPATIENT
Start: 2019-03-03 | End: 2019-03-04

## 2019-03-02 RX ORDER — INSULIN GLARGINE 100 [IU]/ML
10 INJECTION, SOLUTION SUBCUTANEOUS AT BEDTIME
Qty: 0 | Refills: 0 | Status: DISCONTINUED | OUTPATIENT
Start: 2019-03-02 | End: 2019-03-06

## 2019-03-02 RX ADMIN — Medication 12.5 MILLIGRAM(S): at 18:05

## 2019-03-02 RX ADMIN — Medication 4: at 12:18

## 2019-03-02 RX ADMIN — MEMANTINE HYDROCHLORIDE 10 MILLIGRAM(S): 10 TABLET ORAL at 18:05

## 2019-03-02 RX ADMIN — Medication 80 MILLIGRAM(S): at 05:47

## 2019-03-02 RX ADMIN — Medication 12.5 MILLIGRAM(S): at 05:47

## 2019-03-02 RX ADMIN — PIPERACILLIN AND TAZOBACTAM 25 GRAM(S): 4; .5 INJECTION, POWDER, LYOPHILIZED, FOR SOLUTION INTRAVENOUS at 14:33

## 2019-03-02 RX ADMIN — PIPERACILLIN AND TAZOBACTAM 25 GRAM(S): 4; .5 INJECTION, POWDER, LYOPHILIZED, FOR SOLUTION INTRAVENOUS at 21:49

## 2019-03-02 RX ADMIN — Medication 100 MILLIEQUIVALENT(S): at 11:23

## 2019-03-02 RX ADMIN — HEPARIN SODIUM 5000 UNIT(S): 5000 INJECTION INTRAVENOUS; SUBCUTANEOUS at 21:50

## 2019-03-02 RX ADMIN — PIPERACILLIN AND TAZOBACTAM 25 GRAM(S): 4; .5 INJECTION, POWDER, LYOPHILIZED, FOR SOLUTION INTRAVENOUS at 05:48

## 2019-03-02 RX ADMIN — Medication 81 MILLIGRAM(S): at 11:24

## 2019-03-02 RX ADMIN — Medication 100 MILLIGRAM(S): at 05:47

## 2019-03-02 RX ADMIN — MEMANTINE HYDROCHLORIDE 10 MILLIGRAM(S): 10 TABLET ORAL at 05:47

## 2019-03-02 RX ADMIN — Medication 1 TABLET(S): at 18:06

## 2019-03-02 RX ADMIN — HEPARIN SODIUM 5000 UNIT(S): 5000 INJECTION INTRAVENOUS; SUBCUTANEOUS at 14:31

## 2019-03-02 RX ADMIN — HEPARIN SODIUM 5000 UNIT(S): 5000 INJECTION INTRAVENOUS; SUBCUTANEOUS at 05:47

## 2019-03-02 RX ADMIN — INSULIN GLARGINE 10 UNIT(S): 100 INJECTION, SOLUTION SUBCUTANEOUS at 21:50

## 2019-03-02 RX ADMIN — DESMOPRESSIN ACETATE 1 SPRAY(S): 0.1 TABLET ORAL at 18:56

## 2019-03-02 RX ADMIN — Medication 2: at 09:45

## 2019-03-02 RX ADMIN — Medication 500 MILLIGRAM(S): at 11:24

## 2019-03-02 RX ADMIN — SODIUM CHLORIDE 60 MILLILITER(S): 9 INJECTION, SOLUTION INTRAVENOUS at 11:23

## 2019-03-02 RX ADMIN — ZINC SULFATE TAB 220 MG (50 MG ZINC EQUIVALENT) 220 MILLIGRAM(S): 220 (50 ZN) TAB at 11:24

## 2019-03-02 RX ADMIN — Medication 1 TABLET(S): at 05:48

## 2019-03-02 NOTE — PROGRESS NOTE ADULT - SUBJECTIVE AND OBJECTIVE BOX
cc: left facial droop  hpi: 85y female w/ PMH DM2, PVD s/p Left  AKA, dementia (non-verbal at baseline), Afib (not on AC), Major depression, HTN admitted on 2/18 from SNF for evaluation of left facial droop.  Chart reviewed- pt seen early this morning.  Mumbled a few words. Being fed breakfast.     3/2- no complaints. No events.  No fevers overnight.     ros- limited due to dementia      Vital Signs Last 24 Hrs  T(C): 37.6 (02 Mar 2019 11:45), Max: 38 (01 Mar 2019 13:35)  T(F): 99.6 (02 Mar 2019 11:45), Max: 100.4 (01 Mar 2019 13:35)  HR: 76 (02 Mar 2019 11:45) (75 - 84)  BP: 163/42 (02 Mar 2019 11:45) (126/86 - 163/42)  BP(mean): --  RR: 18 (02 Mar 2019 11:45) (18 - 18)  SpO2: 100% (02 Mar 2019 11:45) (78% - 100%)    PHYSICAL EXAM:  General: chronic ill appearing elderly female in NAD, comfortable  Neuro: mumbling  HEENT: NCAT,   Neck: Soft and supple  Respiratory: CTA b/l, no w/r/r  Cardiovascular: S1 and S2, RRR  Gastrointestinal: +BS, soft, NTND  Extremities: No edema  Vascular/MSK: left AKA,    skin: sacral decub.          LABS: All Labs Reviewed:                        10.0   10.35 )-----------( 282      ( 01 Mar 2019 05:46 )             32.4     03-02    150<H>  |  112<H>  |  15  ----------------------------<  227<H>  3.4<L>   |  30  |  1.44<H>    Ca    8.6      02 Mar 2019 05:48  Mg     2.1     03-02                                10.0   10.35 )-----------( 282      ( 01 Mar 2019 05:46 )             32.4     03-01    150<H>  |  115<H>  |  10  ----------------------------<  84  3.1<L>   |  29  |  1.22    Ca    8.3<L>      01 Mar 2019 05:46        MEDICATIONS  (STANDING):  acetaminophen  Suppository .. 650 milliGRAM(s) Rectal once  ascorbic acid 500 milliGRAM(s) Oral daily  aspirin  chewable 81 milliGRAM(s) Oral daily  atorvastatin 40 milliGRAM(s) Oral at bedtime  calcium carbonate 1250 mG  + Vitamin D (OsCal 500 + D) 1 Tablet(s) Oral two times a day  desmopressin 0.01% Nasal 1 Spray(s) Nasal daily  dextrose 5%. 1000 milliLiter(s) (60 mL/Hr) IV Continuous <Continuous>  dextrose 5%. 1000 milliLiter(s) (50 mL/Hr) IV Continuous <Continuous>  dextrose 50% Injectable 12.5 Gram(s) IV Push once  dextrose 50% Injectable 25 Gram(s) IV Push once  dextrose 50% Injectable 25 Gram(s) IV Push once  docusate sodium 100 milliGRAM(s) Oral two times a day  heparin  Injectable 5000 Unit(s) SubCutaneous every 8 hours  insulin lispro (HumaLOG) corrective regimen sliding scale   SubCutaneous three times a day before meals  insulin lispro (HumaLOG) corrective regimen sliding scale   SubCutaneous at bedtime  melatonin 3 milliGRAM(s) Oral at bedtime  memantine 10 milliGRAM(s) Oral two times a day  metoprolol tartrate 12.5 milliGRAM(s) Oral two times a day  mirtazapine 15 milliGRAM(s) Oral at bedtime  piperacillin/tazobactam IVPB. 3.375 Gram(s) IV Intermittent every 8 hours  potassium chloride    Tablet ER 40 milliEquivalent(s) Oral once  verapamil 80 milliGRAM(s) Oral daily  zinc sulfate 220 milliGRAM(s) Oral daily    MEDICATIONS  (PRN):  acetaminophen   Tablet .. 650 milliGRAM(s) Oral every 6 hours PRN Temp greater or equal to 38C (100.4F), Mild Pain (1 - 3)  dextrose 40% Gel 15 Gram(s) Oral once PRN Blood Glucose LESS THAN 70 milliGRAM(s)/deciliter  glucagon  Injectable 1 milliGRAM(s) IntraMuscular once PRN Glucose LESS THAN 70 milligrams/deciliter          ASSESSMENT and PLAN:    85y female w/     1. acute/subacute CVA  - residual weakness  - unable to tolerate MRI  - repeat CT in 48hrs no changes  - aspirin, lipitor  - Cardio f/u appreciated- rec AC for Afib but family uncertain  - -speech and swallow/dysphagia eval appreciated, puree diet    -fall px, aspiration px  -BP control    2. sacral decubitus ulcer, sacral Osteomyelitis  - MRI +  - vanco, zosyn renally dose    - ID f/u appreciated   - Surgical f/u appreciated- no intervention.  Local care. Possible wound vac    3. afib  - BB, CCb  - Cardio f/u appreciated- rec AC but family doesn't want at this time    4. PAD s/p left aka  - aspirin, statin    5. dementia, acute on chronic metabolic encephalopathy  - tx above   - supportive care    6.  T2D, hypoglycemia  Hemoglobin A1C, Whole Blood: 8.7: Method: Immunoassay  - stop lantus b/c epidoses hypoglycemia early am and pt not eating much  - Endo f/u appreciated  - ISS, fs  3/2-glucose elevated this morning, will adjust meds      7. hypernatremia, hypokalemia  - stop NS.  encourage oral intake  change to 1/2 +KCl  3/2- still elevated,  change IVF, repeat bmp in am     dvt px  heparin sc    Advance directives- family undecided.  Overall poor prognosis.  Paliative f/u appreciated- they are awaiting call back from daughter, Deepti.

## 2019-03-03 LAB
ANION GAP SERPL CALC-SCNC: 6 MMOL/L — SIGNIFICANT CHANGE UP (ref 5–17)
BUN SERPL-MCNC: 14 MG/DL — SIGNIFICANT CHANGE UP (ref 7–23)
CALCIUM SERPL-MCNC: 8.5 MG/DL — SIGNIFICANT CHANGE UP (ref 8.5–10.1)
CHLORIDE SERPL-SCNC: 113 MMOL/L — HIGH (ref 96–108)
CO2 SERPL-SCNC: 27 MMOL/L — SIGNIFICANT CHANGE UP (ref 22–31)
CREAT SERPL-MCNC: 1.31 MG/DL — HIGH (ref 0.5–1.3)
GLUCOSE SERPL-MCNC: 137 MG/DL — HIGH (ref 70–99)
POTASSIUM SERPL-MCNC: 3.4 MMOL/L — LOW (ref 3.5–5.3)
POTASSIUM SERPL-SCNC: 3.4 MMOL/L — LOW (ref 3.5–5.3)
SODIUM SERPL-SCNC: 146 MMOL/L — HIGH (ref 135–145)

## 2019-03-03 RX ORDER — POTASSIUM CHLORIDE 20 MEQ
10 PACKET (EA) ORAL
Qty: 0 | Refills: 0 | Status: COMPLETED | OUTPATIENT
Start: 2019-03-03 | End: 2019-03-03

## 2019-03-03 RX ORDER — SODIUM CHLORIDE 9 MG/ML
1000 INJECTION, SOLUTION INTRAVENOUS
Qty: 0 | Refills: 0 | Status: DISCONTINUED | OUTPATIENT
Start: 2019-03-03 | End: 2019-03-04

## 2019-03-03 RX ADMIN — Medication 100 MILLIEQUIVALENT(S): at 17:13

## 2019-03-03 RX ADMIN — HEPARIN SODIUM 5000 UNIT(S): 5000 INJECTION INTRAVENOUS; SUBCUTANEOUS at 06:16

## 2019-03-03 RX ADMIN — DESMOPRESSIN ACETATE 1 SPRAY(S): 0.1 TABLET ORAL at 13:10

## 2019-03-03 RX ADMIN — Medication 12.5 MILLIGRAM(S): at 18:52

## 2019-03-03 RX ADMIN — Medication 500 MILLIGRAM(S): at 13:09

## 2019-03-03 RX ADMIN — INSULIN GLARGINE 10 UNIT(S): 100 INJECTION, SOLUTION SUBCUTANEOUS at 22:52

## 2019-03-03 RX ADMIN — Medication 250 MILLIGRAM(S): at 06:16

## 2019-03-03 RX ADMIN — Medication 100 MILLIEQUIVALENT(S): at 16:08

## 2019-03-03 RX ADMIN — Medication 81 MILLIGRAM(S): at 13:09

## 2019-03-03 RX ADMIN — PIPERACILLIN AND TAZOBACTAM 25 GRAM(S): 4; .5 INJECTION, POWDER, LYOPHILIZED, FOR SOLUTION INTRAVENOUS at 18:49

## 2019-03-03 RX ADMIN — Medication 100 MILLIEQUIVALENT(S): at 13:08

## 2019-03-03 RX ADMIN — MEMANTINE HYDROCHLORIDE 10 MILLIGRAM(S): 10 TABLET ORAL at 18:51

## 2019-03-03 RX ADMIN — ZINC SULFATE TAB 220 MG (50 MG ZINC EQUIVALENT) 220 MILLIGRAM(S): 220 (50 ZN) TAB at 13:10

## 2019-03-03 RX ADMIN — MEMANTINE HYDROCHLORIDE 10 MILLIGRAM(S): 10 TABLET ORAL at 06:16

## 2019-03-03 RX ADMIN — HEPARIN SODIUM 5000 UNIT(S): 5000 INJECTION INTRAVENOUS; SUBCUTANEOUS at 22:53

## 2019-03-03 RX ADMIN — Medication 12.5 MILLIGRAM(S): at 06:16

## 2019-03-03 RX ADMIN — PIPERACILLIN AND TAZOBACTAM 25 GRAM(S): 4; .5 INJECTION, POWDER, LYOPHILIZED, FOR SOLUTION INTRAVENOUS at 08:22

## 2019-03-03 RX ADMIN — Medication 80 MILLIGRAM(S): at 06:16

## 2019-03-03 RX ADMIN — HEPARIN SODIUM 5000 UNIT(S): 5000 INJECTION INTRAVENOUS; SUBCUTANEOUS at 13:10

## 2019-03-03 NOTE — CHART NOTE - NSCHARTNOTEFT_GEN_A_CORE
Assessment:   Pt continues to have fair appetite,  Pt must be fed  Pt continues to meet criteria for severe protein-calorie malnutrition  Pt and family to meet with palliative tomorrow to   decide on GOC    Will continue to monitor      Diet Prescription: Diet, Pureed:   Consistent Carbohydrate {Evening Snack} (CSTCHOSN) (02-21-19 @ 10:51)      Wt Hx:    Weight (kg): 62.2 (02-18-19 @ 09:50) Assessment:   Pt continues to have fair to poor appetite,  Pt must be fed  nursing reports that pt is at risk for aspiration, pt  coughs when eating, pockets food  Suggest NPO until seen by SLP  Pt continues to meet criteria for severe protein-calorie malnutrition  Pt and family to meet with palliative tomorrow to   decide on GOC    Will continue to monitor      Diet Prescription: Diet, Pureed:   Consistent Carbohydrate {Evening Snack} (CSTCHOSN) (02-21-19 @ 10:51)      Wt Hx:    Weight (kg): 62.2 (02-18-19 @ 09:50)

## 2019-03-03 NOTE — PROGRESS NOTE ADULT - SUBJECTIVE AND OBJECTIVE BOX
cc: left facial droop  hpi: 85y female w/ PMH DM2, PVD s/p Left  AKA, dementia (non-verbal at baseline), Afib (not on AC), Major depression, HTN admitted on 2/18 from SNF for evaluation of left facial droop.  Chart reviewed- pt seen early this morning.  Mumbled a few words. Being fed breakfast.     3/2- no complaints. No events.  No fevers overnight.   3/3-    ros- limited due to dementia      Vital Signs Last 24 Hrs  T(C): 37.6 (03 Mar 2019 05:16), Max: 37.6 (02 Mar 2019 11:45)  T(F): 99.6 (03 Mar 2019 05:16), Max: 99.6 (02 Mar 2019 11:45)  HR: 69 (03 Mar 2019 05:16) (69 - 76)  BP: 130/69 (03 Mar 2019 05:16) (128/93 - 163/42)  BP(mean): --  RR: 18 (03 Mar 2019 05:16) (18 - 18)  SpO2: 98% (03 Mar 2019 05:16) (98% - 100%)    PHYSICAL EXAM:  General: chronic ill appearing elderly female in NAD, comfortable  Neuro: mumbling  HEENT: NCAT,   Neck: Soft and supple  Respiratory: CTA b/l, no w/r/r  Cardiovascular: S1 and S2, RRR  Gastrointestinal: +BS, soft, NTND  Extremities: No edema  Vascular/MSK: left AKA,    skin: sacral decub.            LABS: All Labs Reviewed:    03-03    146<H>  |  113<H>  |  14  ----------------------------<  137<H>  3.4<L>   |  27  |  1.31<H>    Ca    8.5      03 Mar 2019 06:06  Mg     2.1     03-02                                  10.0   10.35 )-----------( 282      ( 01 Mar 2019 05:46 )             32.4     03-02    150<H>  |  112<H>  |  15  ----------------------------<  227<H>  3.4<L>   |  30  |  1.44<H>    Ca    8.6      02 Mar 2019 05:48  Mg     2.1     03-02                                10.0   10.35 )-----------( 282      ( 01 Mar 2019 05:46 )             32.4     03-01    150<H>  |  115<H>  |  10  ----------------------------<  84  3.1<L>   |  29  |  1.22    Ca    8.3<L>      01 Mar 2019 05:46        MEDICATIONS  (STANDING):  acetaminophen  Suppository .. 650 milliGRAM(s) Rectal once  ascorbic acid 500 milliGRAM(s) Oral daily  aspirin  chewable 81 milliGRAM(s) Oral daily  atorvastatin 40 milliGRAM(s) Oral at bedtime  calcium carbonate 1250 mG  + Vitamin D (OsCal 500 + D) 1 Tablet(s) Oral two times a day  desmopressin 0.01% Nasal 1 Spray(s) Nasal daily  dextrose 5%. 1000 milliLiter(s) (60 mL/Hr) IV Continuous <Continuous>  dextrose 5%. 1000 milliLiter(s) (60 mL/Hr) IV Continuous <Continuous>  dextrose 5%. 1000 milliLiter(s) (50 mL/Hr) IV Continuous <Continuous>  dextrose 50% Injectable 12.5 Gram(s) IV Push once  dextrose 50% Injectable 25 Gram(s) IV Push once  dextrose 50% Injectable 25 Gram(s) IV Push once  docusate sodium 100 milliGRAM(s) Oral two times a day  heparin  Injectable 5000 Unit(s) SubCutaneous every 8 hours  insulin glargine Injectable (LANTUS) 10 Unit(s) SubCutaneous at bedtime  insulin lispro (HumaLOG) corrective regimen sliding scale   SubCutaneous three times a day before meals  insulin lispro (HumaLOG) corrective regimen sliding scale   SubCutaneous at bedtime  melatonin 3 milliGRAM(s) Oral at bedtime  memantine 10 milliGRAM(s) Oral two times a day  metoprolol tartrate 12.5 milliGRAM(s) Oral two times a day  mirtazapine 15 milliGRAM(s) Oral at bedtime  piperacillin/tazobactam IVPB. 3.375 Gram(s) IV Intermittent every 8 hours  potassium chloride    Tablet ER 40 milliEquivalent(s) Oral once  potassium chloride  10 mEq/100 mL IVPB 10 milliEquivalent(s) IV Intermittent every 1 hour  vancomycin  IVPB 1000 milliGRAM(s) IV Intermittent every 24 hours  verapamil 80 milliGRAM(s) Oral daily  zinc sulfate 220 milliGRAM(s) Oral daily    MEDICATIONS  (PRN):  acetaminophen   Tablet .. 650 milliGRAM(s) Oral every 6 hours PRN Temp greater or equal to 38C (100.4F), Mild Pain (1 - 3)  dextrose 40% Gel 15 Gram(s) Oral once PRN Blood Glucose LESS THAN 70 milliGRAM(s)/deciliter  glucagon  Injectable 1 milliGRAM(s) IntraMuscular once PRN Glucose LESS THAN 70 milligrams/deciliter            ASSESSMENT and PLAN:    85y female w/     1. acute/subacute CVA  - residual weakness  - unable to tolerate MRI  - repeat CT in 48hrs no changes  - aspirin, lipitor  - Cardio f/u appreciated- rec AC for Afib but family uncertain  - -speech and swallow/dysphagia eval appreciated, puree diet    -fall px, aspiration px  -BP control    2. sacral decubitus ulcer, sacral Osteomyelitis  - MRI +  - vanco, zosyn renally dose    - ID f/u appreciated   - Surgical f/u appreciated- no intervention.  Local care. Possible wound vac    3. afib  - BB, CCb  - Cardio f/u appreciated- rec AC but family doesn't want at this time    4. PAD s/p left aka  - aspirin, statin    5. dementia, acute on chronic metabolic encephalopathy  - tx above   - supportive care    6.  T2D, hypoglycemia  Hemoglobin A1C, Whole Blood: 8.7: Method: Immunoassay  - stop lantus b/c epidoses hypoglycemia early am and pt not eating much  - Endo f/u appreciated  - ISS, fs  3/2-glucose elevated this morning, will adjust meds    3/3- glucose improving    7. hypernatremia, hypokalemia, SYLVIA on CKD3  - stop NS.  encourage oral intake  change to 1/2 +KCl  3/2- still elevated,  change IVF, repeat bmp in am   3/3- improving w/ D5W- continue;  will order IV kcl supplements    dvt px  heparin sc    Advance directives- family undecided.  Overall poor prognosis.  Paliative f/u appreciated- they are awaiting call back from daughter, Deepti. cc: left facial droop  hpi: 85y female w/ PMH DM2, PVD s/p Left  AKA, dementia (non-verbal at baseline), Afib (not on AC), Major depression, HTN admitted on 2/18 from SNF for evaluation of left facial droop.  Chart reviewed- pt seen early this morning.  Mumbled a few words. Being fed breakfast.     3/2- no complaints. No events.  No fevers overnight.   3/3- no events, no complaints.  Discussed w/ daughters via phone.  They understand need for abx for wound and AC for afib- they are considering either xarelto or eliquis and will let me know tomorrow.  They state they have meeting w/ Dr. Wise tomorrow and then they will decide where they want mother to go.     ros- limited due to dementia      Vital Signs Last 24 Hrs  T(C): 37.6 (03 Mar 2019 05:16), Max: 37.6 (02 Mar 2019 11:45)  T(F): 99.6 (03 Mar 2019 05:16), Max: 99.6 (02 Mar 2019 11:45)  HR: 69 (03 Mar 2019 05:16) (69 - 76)  BP: 130/69 (03 Mar 2019 05:16) (128/93 - 163/42)  BP(mean): --  RR: 18 (03 Mar 2019 05:16) (18 - 18)  SpO2: 98% (03 Mar 2019 05:16) (98% - 100%)    PHYSICAL EXAM:  General: chronic ill appearing elderly female in NAD, comfortable  Neuro: mumbling  HEENT: NCAT,   Neck: Soft and supple  Respiratory: CTA b/l, no w/r/r  Cardiovascular: S1 and S2, RRR  Gastrointestinal: +BS, soft, NTND  Extremities: No edema  Vascular/MSK: left AKA,    skin: sacral decub.            LABS: All Labs Reviewed:    03-03    146<H>  |  113<H>  |  14  ----------------------------<  137<H>  3.4<L>   |  27  |  1.31<H>    Ca    8.5      03 Mar 2019 06:06  Mg     2.1     03-02                                  10.0   10.35 )-----------( 282      ( 01 Mar 2019 05:46 )             32.4     03-02    150<H>  |  112<H>  |  15  ----------------------------<  227<H>  3.4<L>   |  30  |  1.44<H>    Ca    8.6      02 Mar 2019 05:48  Mg     2.1     03-02                                10.0   10.35 )-----------( 282      ( 01 Mar 2019 05:46 )             32.4     03-01    150<H>  |  115<H>  |  10  ----------------------------<  84  3.1<L>   |  29  |  1.22    Ca    8.3<L>      01 Mar 2019 05:46        MEDICATIONS  (STANDING):  acetaminophen  Suppository .. 650 milliGRAM(s) Rectal once  ascorbic acid 500 milliGRAM(s) Oral daily  aspirin  chewable 81 milliGRAM(s) Oral daily  atorvastatin 40 milliGRAM(s) Oral at bedtime  calcium carbonate 1250 mG  + Vitamin D (OsCal 500 + D) 1 Tablet(s) Oral two times a day  desmopressin 0.01% Nasal 1 Spray(s) Nasal daily  dextrose 5%. 1000 milliLiter(s) (60 mL/Hr) IV Continuous <Continuous>  dextrose 5%. 1000 milliLiter(s) (60 mL/Hr) IV Continuous <Continuous>  dextrose 5%. 1000 milliLiter(s) (50 mL/Hr) IV Continuous <Continuous>  dextrose 50% Injectable 12.5 Gram(s) IV Push once  dextrose 50% Injectable 25 Gram(s) IV Push once  dextrose 50% Injectable 25 Gram(s) IV Push once  docusate sodium 100 milliGRAM(s) Oral two times a day  heparin  Injectable 5000 Unit(s) SubCutaneous every 8 hours  insulin glargine Injectable (LANTUS) 10 Unit(s) SubCutaneous at bedtime  insulin lispro (HumaLOG) corrective regimen sliding scale   SubCutaneous three times a day before meals  insulin lispro (HumaLOG) corrective regimen sliding scale   SubCutaneous at bedtime  melatonin 3 milliGRAM(s) Oral at bedtime  memantine 10 milliGRAM(s) Oral two times a day  metoprolol tartrate 12.5 milliGRAM(s) Oral two times a day  mirtazapine 15 milliGRAM(s) Oral at bedtime  piperacillin/tazobactam IVPB. 3.375 Gram(s) IV Intermittent every 8 hours  potassium chloride    Tablet ER 40 milliEquivalent(s) Oral once  potassium chloride  10 mEq/100 mL IVPB 10 milliEquivalent(s) IV Intermittent every 1 hour  vancomycin  IVPB 1000 milliGRAM(s) IV Intermittent every 24 hours  verapamil 80 milliGRAM(s) Oral daily  zinc sulfate 220 milliGRAM(s) Oral daily    MEDICATIONS  (PRN):  acetaminophen   Tablet .. 650 milliGRAM(s) Oral every 6 hours PRN Temp greater or equal to 38C (100.4F), Mild Pain (1 - 3)  dextrose 40% Gel 15 Gram(s) Oral once PRN Blood Glucose LESS THAN 70 milliGRAM(s)/deciliter  glucagon  Injectable 1 milliGRAM(s) IntraMuscular once PRN Glucose LESS THAN 70 milligrams/deciliter            ASSESSMENT and PLAN:    85y female w/     1. LUE weakness likely due to acute/subacute CVA  - unable to tolerate MRI  - repeat CT in 48hrs no changes  - aspirin, lipitor  - Cardio f/u appreciated- rec AC for Afib but family uncertain and will let me know tomorrow 3/4   - -speech and swallow/dysphagia eval appreciated, puree diet    -fall px, aspiration px  -BP control    2. sacral decubitus ulcer, sacral Osteomyelitis  - MRI +  - vanco, zosyn renally dose    - ID f/u appreciated   - Surgical f/u appreciated- no intervention.  Local care. Possible wound vac- family requesting wound care re eval 3/4     3. afib  - BB, CCb  - Cardio f/u appreciated- rec AC and family will decide by 3/4    4. PAD s/p left aka  - aspirin, statin    5. dementia, acute on chronic metabolic encephalopathy  - tx above   - supportive care    6.  T2D, hypoglycemia  Hemoglobin A1C, Whole Blood: 8.7: Method: Immunoassay  - stop lantus b/c epidoses hypoglycemia early am and pt not eating much  - Endo f/u appreciated  - ISS, fs  3/2-glucose elevated this morning, will adjust meds    3/3- glucose improving    7. hypernatremia, hypokalemia, SYLVIA on CKD3  - stop NS.  encourage oral intake  change to 1/2 +KCl  3/2- still elevated,  change IVF, repeat bmp in am   3/3- improving w/ D5W- continue;  will order IV kcl supplements    dvt px  heparin sc    Advance directives- family undecided.  Overall poor prognosis.  Palliative f/u appreciated- they are awaiting call back from daughter, Deepti.    3/4- daughter states she has meeting w/ palliative team tomorrow.  Requests wound re eval for possible vac.  Will decide on AC tomorrow.

## 2019-03-04 LAB
ANION GAP SERPL CALC-SCNC: 6 MMOL/L — SIGNIFICANT CHANGE UP (ref 5–17)
ANION GAP SERPL CALC-SCNC: 9 MMOL/L — SIGNIFICANT CHANGE UP (ref 5–17)
BUN SERPL-MCNC: 13 MG/DL — SIGNIFICANT CHANGE UP (ref 7–23)
BUN SERPL-MCNC: 13 MG/DL — SIGNIFICANT CHANGE UP (ref 7–23)
CALCIUM SERPL-MCNC: 8.4 MG/DL — LOW (ref 8.5–10.1)
CALCIUM SERPL-MCNC: 9 MG/DL — SIGNIFICANT CHANGE UP (ref 8.5–10.1)
CHLORIDE SERPL-SCNC: 111 MMOL/L — HIGH (ref 96–108)
CHLORIDE SERPL-SCNC: 113 MMOL/L — HIGH (ref 96–108)
CO2 SERPL-SCNC: 30 MMOL/L — SIGNIFICANT CHANGE UP (ref 22–31)
CO2 SERPL-SCNC: 32 MMOL/L — HIGH (ref 22–31)
CREAT SERPL-MCNC: 1.37 MG/DL — HIGH (ref 0.5–1.3)
CREAT SERPL-MCNC: 1.41 MG/DL — HIGH (ref 0.5–1.3)
GLUCOSE SERPL-MCNC: 145 MG/DL — HIGH (ref 70–99)
GLUCOSE SERPL-MCNC: 151 MG/DL — HIGH (ref 70–99)
POTASSIUM SERPL-MCNC: 3.3 MMOL/L — LOW (ref 3.5–5.3)
POTASSIUM SERPL-MCNC: 3.6 MMOL/L — SIGNIFICANT CHANGE UP (ref 3.5–5.3)
POTASSIUM SERPL-SCNC: 3.3 MMOL/L — LOW (ref 3.5–5.3)
POTASSIUM SERPL-SCNC: 3.6 MMOL/L — SIGNIFICANT CHANGE UP (ref 3.5–5.3)
SODIUM SERPL-SCNC: 149 MMOL/L — HIGH (ref 135–145)
SODIUM SERPL-SCNC: 152 MMOL/L — HIGH (ref 135–145)

## 2019-03-04 RX ORDER — POTASSIUM CHLORIDE 20 MEQ
10 PACKET (EA) ORAL EVERY 4 HOURS
Qty: 0 | Refills: 0 | Status: COMPLETED | OUTPATIENT
Start: 2019-03-04 | End: 2019-03-04

## 2019-03-04 RX ADMIN — HEPARIN SODIUM 5000 UNIT(S): 5000 INJECTION INTRAVENOUS; SUBCUTANEOUS at 06:15

## 2019-03-04 RX ADMIN — ZINC SULFATE TAB 220 MG (50 MG ZINC EQUIVALENT) 220 MILLIGRAM(S): 220 (50 ZN) TAB at 11:53

## 2019-03-04 RX ADMIN — MIRTAZAPINE 15 MILLIGRAM(S): 45 TABLET, ORALLY DISINTEGRATING ORAL at 22:47

## 2019-03-04 RX ADMIN — Medication 1 TABLET(S): at 17:51

## 2019-03-04 RX ADMIN — HEPARIN SODIUM 5000 UNIT(S): 5000 INJECTION INTRAVENOUS; SUBCUTANEOUS at 22:47

## 2019-03-04 RX ADMIN — Medication 81 MILLIGRAM(S): at 11:53

## 2019-03-04 RX ADMIN — Medication 12.5 MILLIGRAM(S): at 17:50

## 2019-03-04 RX ADMIN — PIPERACILLIN AND TAZOBACTAM 25 GRAM(S): 4; .5 INJECTION, POWDER, LYOPHILIZED, FOR SOLUTION INTRAVENOUS at 22:48

## 2019-03-04 RX ADMIN — Medication 2: at 11:53

## 2019-03-04 RX ADMIN — ATORVASTATIN CALCIUM 40 MILLIGRAM(S): 80 TABLET, FILM COATED ORAL at 22:47

## 2019-03-04 RX ADMIN — Medication 2: at 08:19

## 2019-03-04 RX ADMIN — DESMOPRESSIN ACETATE 1 SPRAY(S): 0.1 TABLET ORAL at 11:54

## 2019-03-04 RX ADMIN — MEMANTINE HYDROCHLORIDE 10 MILLIGRAM(S): 10 TABLET ORAL at 17:50

## 2019-03-04 RX ADMIN — Medication 500 MILLIGRAM(S): at 11:52

## 2019-03-04 RX ADMIN — Medication 250 MILLIGRAM(S): at 06:15

## 2019-03-04 RX ADMIN — Medication 3 MILLIGRAM(S): at 22:47

## 2019-03-04 RX ADMIN — INSULIN GLARGINE 10 UNIT(S): 100 INJECTION, SOLUTION SUBCUTANEOUS at 22:47

## 2019-03-04 RX ADMIN — HEPARIN SODIUM 5000 UNIT(S): 5000 INJECTION INTRAVENOUS; SUBCUTANEOUS at 14:52

## 2019-03-04 RX ADMIN — Medication 100 MILLIEQUIVALENT(S): at 14:52

## 2019-03-04 RX ADMIN — PIPERACILLIN AND TAZOBACTAM 25 GRAM(S): 4; .5 INJECTION, POWDER, LYOPHILIZED, FOR SOLUTION INTRAVENOUS at 14:51

## 2019-03-04 RX ADMIN — PIPERACILLIN AND TAZOBACTAM 25 GRAM(S): 4; .5 INJECTION, POWDER, LYOPHILIZED, FOR SOLUTION INTRAVENOUS at 08:18

## 2019-03-04 RX ADMIN — Medication 100 MILLIEQUIVALENT(S): at 16:07

## 2019-03-04 RX ADMIN — PIPERACILLIN AND TAZOBACTAM 25 GRAM(S): 4; .5 INJECTION, POWDER, LYOPHILIZED, FOR SOLUTION INTRAVENOUS at 02:36

## 2019-03-04 NOTE — CHART NOTE - NSCHARTNOTEFT_GEN_A_CORE
This SW spoke with pts daughter Deepti she is not able to meet or speak by phone tomorrow. She has requested a 9AM meeting on Wednesday. Our team will continue to follow.

## 2019-03-04 NOTE — PROGRESS NOTE ADULT - SUBJECTIVE AND OBJECTIVE BOX
cc: left facial droop  hpi: 85y female w/ PMH DM2, PVD s/p Left  AKA, dementia (non-verbal at baseline), Afib (not on AC), Major depression, HTN admitted on 2/18 from SNF for evaluation of left facial droop.  Chart reviewed- pt seen early this morning.  Mumbled a few words. Being fed breakfast.     3/2- no complaints. No events.  No fevers overnight.   3/3- no events, no complaints.  Discussed w/ daughters via phone.  They understand need for abx for wound and AC for afib- they are considering either xarelto or eliquis and will let me know tomorrow.  They state they have meeting w/ Dr. Wise tomorrow and then they will decide where they want mother to go.   3/4- no events, no complaints.  Family to meet w/ PC and then d/c planning.     ros- limited due to dementia      Vital Signs Last 24 Hrs  T(C): 37.8 (04 Mar 2019 10:58), Max: 37.8 (04 Mar 2019 10:58)  T(F): 100 (04 Mar 2019 10:58), Max: 100 (04 Mar 2019 10:58)  HR: 85 (04 Mar 2019 10:58) (69 - 85)  BP: 143/77 (04 Mar 2019 10:58) (139/69 - 143/77)  BP(mean): --  RR: 17 (04 Mar 2019 10:58) (17 - 18)  SpO2: 100% (04 Mar 2019 10:58) (98% - 100%)  T(C): 37.6 (03 Mar 2019 05:16), Max: 37.6 (02 Mar 2019 11:45)  T(F): 99.6 (03 Mar 2019 05:16), Max: 99.6 (02 Mar 2019 11:45)  HR: 69 (03 Mar 2019 05:16) (69 - 76)  BP: 130/69 (03 Mar 2019 05:16) (128/93 - 163/42)  BP(mean): --  RR: 18 (03 Mar 2019 05:16) (18 - 18)  SpO2: 98% (03 Mar 2019 05:16) (98% - 100%)    PHYSICAL EXAM:  General: chronic ill appearing elderly female in NAD, comfortable  Neuro: mumbling  HEENT: NCAT,   Neck: Soft and supple  Respiratory: CTA b/l, no w/r/r  Cardiovascular: S1 and S2, RRR  Gastrointestinal: +BS, soft, NTND  Extremities: No edema  Vascular/MSK: left AKA,    skin: sacral decub.            LABS: All Labs Reviewed:    03-03    146<H>  |  113<H>  |  14  ----------------------------<  137<H>  3.4<L>   |  27  |  1.31<H>    Ca    8.5      03 Mar 2019 06:06  Mg     2.1     03-02                                  10.0   10.35 )-----------( 282      ( 01 Mar 2019 05:46 )             32.4     03-02    150<H>  |  112<H>  |  15  ----------------------------<  227<H>  3.4<L>   |  30  |  1.44<H>    Ca    8.6      02 Mar 2019 05:48  Mg     2.1     03-02                                10.0   10.35 )-----------( 282      ( 01 Mar 2019 05:46 )             32.4     03-01    150<H>  |  115<H>  |  10  ----------------------------<  84  3.1<L>   |  29  |  1.22    Ca    8.3<L>      01 Mar 2019 05:46        G):  MEDICATIONS  (STANDING):  acetaminophen  Suppository .. 650 milliGRAM(s) Rectal once  ascorbic acid 500 milliGRAM(s) Oral daily  aspirin  chewable 81 milliGRAM(s) Oral daily  atorvastatin 40 milliGRAM(s) Oral at bedtime  calcium carbonate 1250 mG  + Vitamin D (OsCal 500 + D) 1 Tablet(s) Oral two times a day  desmopressin 0.01% Nasal 1 Spray(s) Nasal daily  dextrose 5%. 1000 milliLiter(s) (50 mL/Hr) IV Continuous <Continuous>  dextrose 50% Injectable 12.5 Gram(s) IV Push once  dextrose 50% Injectable 25 Gram(s) IV Push once  dextrose 50% Injectable 25 Gram(s) IV Push once  docusate sodium 100 milliGRAM(s) Oral two times a day  heparin  Injectable 5000 Unit(s) SubCutaneous every 8 hours  insulin glargine Injectable (LANTUS) 10 Unit(s) SubCutaneous at bedtime  insulin lispro (HumaLOG) corrective regimen sliding scale   SubCutaneous three times a day before meals  insulin lispro (HumaLOG) corrective regimen sliding scale   SubCutaneous at bedtime  melatonin 3 milliGRAM(s) Oral at bedtime  memantine 10 milliGRAM(s) Oral two times a day  metoprolol tartrate 12.5 milliGRAM(s) Oral two times a day  mirtazapine 15 milliGRAM(s) Oral at bedtime  piperacillin/tazobactam IVPB. 3.375 Gram(s) IV Intermittent every 8 hours  verapamil 80 milliGRAM(s) Oral daily  zinc sulfate 220 milliGRAM(s) Oral daily    MEDICATIONS  (PRN):  acetaminophen   Tablet .. 650 milliGRAM(s) Oral every 6 hours PRN Temp greater or equal to 38C (100.4F), Mild Pain (1 - 3)  dextrose 40% Gel 15 Gram(s) Oral once PRN Blood Glucose LESS THAN 70 milliGRAM(s)/deciliter  glucagon  Injectable 1 milliGRAM(s) IntraMuscular once PRN Glucose LESS THAN 70 milligrams/deciliter            ASSESSMENT and PLAN:    85y female w/     1. LUE weakness likely due to acute/subacute CVA  - unable to tolerate MRI  - repeat CT in 48hrs no changes  - aspirin, lipitor  - Cardio f/u appreciated- rec AC for Afib but family uncertain and will let me know 3/4   - -speech and swallow/dysphagia eval appreciated, puree diet    -fall px, aspiration px  -BP control    2. sacral decubitus ulcer, sacral Osteomyelitis  - MRI +  - vanco, zosyn renally dose    - ID f/u appreciated - d/c vanco 3/4, will continue iv abx, likely d/c w/ picc   - Surgical f/u appreciated- no intervention.  Local care. Possible wound vac- family requesting wound care re eval 3/4       3. afib  - BB, CCb  - Cardio f/u appreciated- rec AC and family will decide by 3/4    4. PAD s/p left aka  - aspirin, statin    5. dementia, acute on chronic metabolic encephalopathy  - tx above   - supportive care    6.  T2D, hypoglycemia  Hemoglobin A1C, Whole Blood: 8.7: Method: Immunoassay  - stop lantus b/c epidoses hypoglycemia early am and pt not eating much  - Endo f/u appreciated  - ISS, fs  3/2-glucose elevated this morning, will adjust meds    3/3- glucose improving    7. hypernatremia, hx chronic DI undetermined etiology as per 2017 notes and pt on desmopressin   hypokalemia, SYLVIA on CKD3  - stop NS.  encourage oral intake  change to 1/2 +KCl  3/2- still elevated,  change IVF, repeat bmp in am   3/3- improving w/ D5W- continue;  will order IV kcl supplements  3/4- supplement potassium, Na stable     dvt px  heparin sc    Advance directives- family undecided.  Overall poor prognosis.  Palliative f/u appreciated- they are awaiting call back from daughter, Deepti.    3/4- daughter states she has meeting w/ palliative team tomorrow.  Requests wound re eval for possible vac.  Will decide on AC tomorrow.   3/5- PC meeting today.  Discussed d/c planning w/ team, SW.   Wound care re eval to readdress possible wound vac.  Then will need PICC for long term abx.   AC to be determined.

## 2019-03-04 NOTE — PROGRESS NOTE ADULT - SUBJECTIVE AND OBJECTIVE BOX
Patient is a 85y old  Female who presents with a chief complaint of CVA (19 Feb 2019 10:17)      Date of service: 03-04-19 @ 11:54      Patient lying in bed; nonverbal    ROS unable to obtain secondary to patient medical condition     MEDICATIONS  (STANDING):  acetaminophen  Suppository .. 650 milliGRAM(s) Rectal once  ascorbic acid 500 milliGRAM(s) Oral daily  aspirin  chewable 81 milliGRAM(s) Oral daily  atorvastatin 40 milliGRAM(s) Oral at bedtime  calcium carbonate 1250 mG  + Vitamin D (OsCal 500 + D) 1 Tablet(s) Oral two times a day  desmopressin 0.01% Nasal 1 Spray(s) Nasal daily  dextrose 5%. 1000 milliLiter(s) (50 mL/Hr) IV Continuous <Continuous>  dextrose 50% Injectable 12.5 Gram(s) IV Push once  dextrose 50% Injectable 25 Gram(s) IV Push once  dextrose 50% Injectable 25 Gram(s) IV Push once  docusate sodium 100 milliGRAM(s) Oral two times a day  heparin  Injectable 5000 Unit(s) SubCutaneous every 8 hours  insulin glargine Injectable (LANTUS) 10 Unit(s) SubCutaneous at bedtime  insulin lispro (HumaLOG) corrective regimen sliding scale   SubCutaneous three times a day before meals  insulin lispro (HumaLOG) corrective regimen sliding scale   SubCutaneous at bedtime  melatonin 3 milliGRAM(s) Oral at bedtime  memantine 10 milliGRAM(s) Oral two times a day  metoprolol tartrate 12.5 milliGRAM(s) Oral two times a day  mirtazapine 15 milliGRAM(s) Oral at bedtime  piperacillin/tazobactam IVPB. 3.375 Gram(s) IV Intermittent every 8 hours  vancomycin  IVPB 1000 milliGRAM(s) IV Intermittent every 24 hours  verapamil 80 milliGRAM(s) Oral daily  zinc sulfate 220 milliGRAM(s) Oral daily    MEDICATIONS  (PRN):  acetaminophen   Tablet .. 650 milliGRAM(s) Oral every 6 hours PRN Temp greater or equal to 38C (100.4F), Mild Pain (1 - 3)  dextrose 40% Gel 15 Gram(s) Oral once PRN Blood Glucose LESS THAN 70 milliGRAM(s)/deciliter  glucagon  Injectable 1 milliGRAM(s) IntraMuscular once PRN Glucose LESS THAN 70 milligrams/deciliter      Vital Signs Last 24 Hrs  T(C): 37.8 (04 Mar 2019 10:58), Max: 37.8 (04 Mar 2019 10:58)  T(F): 100 (04 Mar 2019 10:58), Max: 100 (04 Mar 2019 10:58)  HR: 85 (04 Mar 2019 10:58) (69 - 85)  BP: 143/77 (04 Mar 2019 10:58) (139/69 - 143/77)  BP(mean): --  RR: 17 (04 Mar 2019 10:58) (17 - 18)  SpO2: 100% (04 Mar 2019 10:58) (98% - 100%)    Physical Exam:    PE:    Constitutional: frail looking  HEENT: NC/AT, EOMI, PERRLA, conjunctivae clear; ears and nose atraumatic; pharynx clear  Neck: supple; thyroid not palpable  Back: no tenderness  Respiratory: respiratory effort normal  Cardiovascular: S1S2 regular, no murmurs  Abdomen: soft, not tender, not distended, positive BS; no liver or spleen organomegaly  Genitourinary: no suprapubic tenderness  Musculoskeletal: no muscle tenderness; left AKA  Neurological/ Psychiatric: nonverbal  Skin: no rashes; 3 cm ulcer over sacrum visible bone, foul smelling brown drainage    Labs: all available labs reviewed              Labs:              Labs:    03-04    152<H>  |  113<H>  |  13  ----------------------------<  151<H>  3.6   |  30  |  1.41<H>    Ca    9.0      04 Mar 2019 05:41             Cultures:                 Radiology: all available radiological tests reviewed    Advanced directives addressed: full resuscitation

## 2019-03-05 RX ORDER — RIVAROXABAN 15 MG-20MG
15 KIT ORAL EVERY 24 HOURS
Qty: 0 | Refills: 0 | Status: DISCONTINUED | OUTPATIENT
Start: 2019-03-05 | End: 2019-03-06

## 2019-03-05 RX ADMIN — Medication 1 TABLET(S): at 17:00

## 2019-03-05 RX ADMIN — Medication 1 TABLET(S): at 05:39

## 2019-03-05 RX ADMIN — Medication 80 MILLIGRAM(S): at 05:39

## 2019-03-05 RX ADMIN — ATORVASTATIN CALCIUM 40 MILLIGRAM(S): 80 TABLET, FILM COATED ORAL at 21:50

## 2019-03-05 RX ADMIN — Medication 12.5 MILLIGRAM(S): at 17:00

## 2019-03-05 RX ADMIN — MEMANTINE HYDROCHLORIDE 10 MILLIGRAM(S): 10 TABLET ORAL at 05:39

## 2019-03-05 RX ADMIN — Medication 12.5 MILLIGRAM(S): at 05:39

## 2019-03-05 RX ADMIN — PIPERACILLIN AND TAZOBACTAM 25 GRAM(S): 4; .5 INJECTION, POWDER, LYOPHILIZED, FOR SOLUTION INTRAVENOUS at 21:51

## 2019-03-05 RX ADMIN — PIPERACILLIN AND TAZOBACTAM 25 GRAM(S): 4; .5 INJECTION, POWDER, LYOPHILIZED, FOR SOLUTION INTRAVENOUS at 05:39

## 2019-03-05 RX ADMIN — MEMANTINE HYDROCHLORIDE 10 MILLIGRAM(S): 10 TABLET ORAL at 16:59

## 2019-03-05 RX ADMIN — MIRTAZAPINE 15 MILLIGRAM(S): 45 TABLET, ORALLY DISINTEGRATING ORAL at 21:50

## 2019-03-05 RX ADMIN — RIVAROXABAN 15 MILLIGRAM(S): KIT at 16:58

## 2019-03-05 RX ADMIN — HEPARIN SODIUM 5000 UNIT(S): 5000 INJECTION INTRAVENOUS; SUBCUTANEOUS at 05:39

## 2019-03-05 RX ADMIN — INSULIN GLARGINE 10 UNIT(S): 100 INJECTION, SOLUTION SUBCUTANEOUS at 21:50

## 2019-03-05 RX ADMIN — PIPERACILLIN AND TAZOBACTAM 25 GRAM(S): 4; .5 INJECTION, POWDER, LYOPHILIZED, FOR SOLUTION INTRAVENOUS at 13:04

## 2019-03-05 RX ADMIN — DESMOPRESSIN ACETATE 1 SPRAY(S): 0.1 TABLET ORAL at 13:49

## 2019-03-05 RX ADMIN — Medication 81 MILLIGRAM(S): at 13:02

## 2019-03-05 RX ADMIN — Medication 500 MILLIGRAM(S): at 13:02

## 2019-03-05 RX ADMIN — Medication 3 MILLIGRAM(S): at 21:50

## 2019-03-05 RX ADMIN — ZINC SULFATE TAB 220 MG (50 MG ZINC EQUIVALENT) 220 MILLIGRAM(S): 220 (50 ZN) TAB at 15:55

## 2019-03-05 RX ADMIN — HEPARIN SODIUM 5000 UNIT(S): 5000 INJECTION INTRAVENOUS; SUBCUTANEOUS at 13:04

## 2019-03-05 NOTE — PROGRESS NOTE ADULT - SUBJECTIVE AND OBJECTIVE BOX
Patient is a 85y old  Female who presents with a chief complaint of CVA (19 Feb 2019 10:17)    Date of service: 03-05-19 @ 12:43      Patient lying in bed; nonverbal      ROS unable to obtain secondary to patient medical condition     MEDICATIONS  (STANDING):  acetaminophen  Suppository .. 650 milliGRAM(s) Rectal once  ascorbic acid 500 milliGRAM(s) Oral daily  aspirin  chewable 81 milliGRAM(s) Oral daily  atorvastatin 40 milliGRAM(s) Oral at bedtime  calcium carbonate 1250 mG  + Vitamin D (OsCal 500 + D) 1 Tablet(s) Oral two times a day  desmopressin 0.01% Nasal 1 Spray(s) Nasal daily  dextrose 5%. 1000 milliLiter(s) (50 mL/Hr) IV Continuous <Continuous>  dextrose 50% Injectable 12.5 Gram(s) IV Push once  dextrose 50% Injectable 25 Gram(s) IV Push once  dextrose 50% Injectable 25 Gram(s) IV Push once  docusate sodium 100 milliGRAM(s) Oral two times a day  heparin  Injectable 5000 Unit(s) SubCutaneous every 8 hours  insulin glargine Injectable (LANTUS) 10 Unit(s) SubCutaneous at bedtime  insulin lispro (HumaLOG) corrective regimen sliding scale   SubCutaneous three times a day before meals  insulin lispro (HumaLOG) corrective regimen sliding scale   SubCutaneous at bedtime  melatonin 3 milliGRAM(s) Oral at bedtime  memantine 10 milliGRAM(s) Oral two times a day  metoprolol tartrate 12.5 milliGRAM(s) Oral two times a day  mirtazapine 15 milliGRAM(s) Oral at bedtime  piperacillin/tazobactam IVPB. 3.375 Gram(s) IV Intermittent every 8 hours  verapamil 80 milliGRAM(s) Oral daily  zinc sulfate 220 milliGRAM(s) Oral daily    MEDICATIONS  (PRN):  acetaminophen   Tablet .. 650 milliGRAM(s) Oral every 6 hours PRN Temp greater or equal to 38C (100.4F), Mild Pain (1 - 3)  dextrose 40% Gel 15 Gram(s) Oral once PRN Blood Glucose LESS THAN 70 milliGRAM(s)/deciliter  glucagon  Injectable 1 milliGRAM(s) IntraMuscular once PRN Glucose LESS THAN 70 milligrams/deciliter      Vital Signs Last 24 Hrs  T(C): 36.9 (05 Mar 2019 11:20), Max: 36.9 (04 Mar 2019 21:23)  T(F): 98.5 (05 Mar 2019 11:20), Max: 98.5 (04 Mar 2019 21:23)  HR: 85 (05 Mar 2019 11:20) (67 - 85)  BP: 127/66 (05 Mar 2019 11:20) (127/66 - 161/66)  BP(mean): --  RR: 17 (05 Mar 2019 11:20) (17 - 18)  SpO2: 100% (05 Mar 2019 11:20) (100% - 100%)    Physical Exam:          Constitutional: frail looking  HEENT: NC/AT, EOMI, PERRLA, conjunctivae clear; ears and nose atraumatic; pharynx clear  Neck: supple; thyroid not palpable  Back: no tenderness  Respiratory: respiratory effort normal  Cardiovascular: S1S2 regular, no murmurs  Abdomen: soft, not tender, not distended, positive BS; no liver or spleen organomegaly  Genitourinary: no suprapubic tenderness  Musculoskeletal: no muscle tenderness; left AKA  Neurological/ Psychiatric: nonverbal  Skin: no rashes; 3 cm ulcer over sacrum visible bone, foul smelling brown drainage    Labs: all available labs reviewed              Labs:              Labs:    03-04    152<H>  |  113<H>  |  13  ----------------------------<  151<H>  3.6   |  30  |  1.41<H>    Ca    9.0      04 Mar 2019 05:41             Cultures:                 Radiology: all available radiological tests reviewed    Advanced directives addressed: full resuscitation

## 2019-03-05 NOTE — PHARMACOTHERAPY INTERVENTION NOTE - COMMENTS
Obtained medication history from [facility] paperwork
Recommended decreasing dose of lantus for hypoglycemia; MD aware and will follow up
Recommended transitioning patient to oral anticoagulation
Recommended reducing dose of lantus for hypoglycemia
Spoke to Dr. Roberts and suggested ordering a vancomycin trough

## 2019-03-05 NOTE — PROGRESS NOTE ADULT - SUBJECTIVE AND OBJECTIVE BOX
cc: left facial droop  hpi: 85y female w/ PMH DM2, PVD s/p Left  AKA, dementia (non-verbal at baseline), Afib (not on AC), Major depression, HTN admitted on 2/18 from SNF for evaluation of left facial droop.  Chart reviewed- pt seen early this morning.  Mumbled a few words. Being fed breakfast.     3/2- no complaints. No events.  No fevers overnight.   3/3- no events, no complaints.  Discussed w/ daughters via phone.  They understand need for abx for wound and AC for afib- they are considering either xarelto or eliquis and will let me know tomorrow.  They state they have meeting w/ Dr. Wise tomorrow and then they will decide where they want mother to go.   3/4- no events, no complaints.  Family to meet w/ PC and then d/c planning.   3/5- no events.     ros- limited due to dementia      Vital Signs Last 24 Hrs  T(C): 36.9 (05 Mar 2019 11:20), Max: 36.9 (04 Mar 2019 21:23)  T(F): 98.5 (05 Mar 2019 11:20), Max: 98.5 (04 Mar 2019 21:23)  HR: 85 (05 Mar 2019 11:20) (67 - 85)  BP: 127/66 (05 Mar 2019 11:20) (127/66 - 161/66)  BP(mean): --  RR: 17 (05 Mar 2019 11:20) (17 - 18)  SpO2: 100% (05 Mar 2019 11:20) (100% - 100%)      PHYSICAL EXAM:  General: chronic ill appearing elderly female in NAD, comfortable  Neuro: mumbling  HEENT: NCAT,   Neck: Soft and supple  Respiratory: CTA b/l, no w/r/r  Cardiovascular: S1 and S2, RRR  Gastrointestinal: +BS, soft, NTND  Extremities: No edema  Vascular/MSK: left AKA,    skin: sacral decub.            LABS: All Labs Reviewed:    03-03    146<H>  |  113<H>  |  14  ----------------------------<  137<H>  3.4<L>   |  27  |  1.31<H>    Ca    8.5      03 Mar 2019 06:06  Mg     2.1     03-02                                  10.0   10.35 )-----------( 282      ( 01 Mar 2019 05:46 )             32.4     03-02    150<H>  |  112<H>  |  15  ----------------------------<  227<H>  3.4<L>   |  30  |  1.44<H>    Ca    8.6      02 Mar 2019 05:48  Mg     2.1     03-02                                10.0   10.35 )-----------( 282      ( 01 Mar 2019 05:46 )             32.4     03-01    150<H>  |  115<H>  |  10  ----------------------------<  84  3.1<L>   |  29  |  1.22    Ca    8.3<L>      01 Mar 2019 05:46        G):  MEDICATIONS  (STANDING):  acetaminophen  Suppository .. 650 milliGRAM(s) Rectal once  ascorbic acid 500 milliGRAM(s) Oral daily  aspirin  chewable 81 milliGRAM(s) Oral daily  atorvastatin 40 milliGRAM(s) Oral at bedtime  calcium carbonate 1250 mG  + Vitamin D (OsCal 500 + D) 1 Tablet(s) Oral two times a day  desmopressin 0.01% Nasal 1 Spray(s) Nasal daily  dextrose 5%. 1000 milliLiter(s) (50 mL/Hr) IV Continuous <Continuous>  dextrose 50% Injectable 12.5 Gram(s) IV Push once  dextrose 50% Injectable 25 Gram(s) IV Push once  dextrose 50% Injectable 25 Gram(s) IV Push once  docusate sodium 100 milliGRAM(s) Oral two times a day  heparin  Injectable 5000 Unit(s) SubCutaneous every 8 hours  insulin glargine Injectable (LANTUS) 10 Unit(s) SubCutaneous at bedtime  insulin lispro (HumaLOG) corrective regimen sliding scale   SubCutaneous three times a day before meals  insulin lispro (HumaLOG) corrective regimen sliding scale   SubCutaneous at bedtime  melatonin 3 milliGRAM(s) Oral at bedtime  memantine 10 milliGRAM(s) Oral two times a day  metoprolol tartrate 12.5 milliGRAM(s) Oral two times a day  mirtazapine 15 milliGRAM(s) Oral at bedtime  piperacillin/tazobactam IVPB. 3.375 Gram(s) IV Intermittent every 8 hours  verapamil 80 milliGRAM(s) Oral daily  zinc sulfate 220 milliGRAM(s) Oral daily    MEDICATIONS  (PRN):  acetaminophen   Tablet .. 650 milliGRAM(s) Oral every 6 hours PRN Temp greater or equal to 38C (100.4F), Mild Pain (1 - 3)  dextrose 40% Gel 15 Gram(s) Oral once PRN Blood Glucose LESS THAN 70 milliGRAM(s)/deciliter  glucagon  Injectable 1 milliGRAM(s) IntraMuscular once PRN Glucose LESS THAN 70 milligrams/deciliter            ASSESSMENT and PLAN:    85y female w/     1. LUE weakness likely due to acute/subacute CVA  - unable to tolerate MRI  - repeat CT in 48hrs no changes  - aspirin, lipitor  - Cardio f/u appreciated- rec AC for Afib but family uncertain and will let me know 3/4   - -speech and swallow/dysphagia eval appreciated, puree diet    -fall px, aspiration px  -BP control    2. sacral decubitus ulcer, sacral Osteomyelitis  - MRI +  - vanco, zosyn renally dose    - ID f/u appreciated - d/c vanco 3/4, will continue iv abx, likely d/c w/ picc   - Surgical f/u appreciated- no intervention.  Local care.         3. afib  - BB, CCb  - Cardio f/u appreciated- rec AC and family will decide by 3/4  start xarelto    4. PAD s/p left aka  - aspirin, statin    5. dementia, acute on chronic metabolic encephalopathy  - tx above   - supportive care    6.  T2D, hypoglycemia  Hemoglobin A1C, Whole Blood: 8.7: Method: Immunoassay  - stop lantus b/c epidoses hypoglycemia early am and pt not eating much  - Endo f/u appreciated  - ISS, fs  3/2-glucose elevated this morning, will adjust meds    3/3- glucose improving    7. hypernatremia, hx chronic DI undetermined etiology as per 2017 notes and pt on desmopressin   hypokalemia, SYLVIA on CKD3  - stop NS.  encourage oral intake  change to 1/2 +KCl  3/2- still elevated,  change IVF, repeat bmp in am   3/3- improving w/ D5W- continue;  will order IV kcl supplements  3/4- supplement potassium, Na stable     dvt px  heparin sc    Advance directives- family undecided.  Overall poor prognosis.  Palliative f/u appreciated- they are awaiting call back from daughter, Deepti.    3/4- daughter states she has meeting w/ palliative team tomorrow.  Requests wound re eval for possible vac.  Will decide on AC tomorrow.   3/5- PC meeting tomorrow.  Discussed d/c planning w/ team, SW. PICC for long term abx.   AC started.

## 2019-03-05 NOTE — PROGRESS NOTE ADULT - SUBJECTIVE AND OBJECTIVE BOX
HPI: Pt seen and examined this am in follow up for sx and GOC. Pt remains confused due to dementia, but was awake and alert and able to say good morning. Unable to provide any further history, appears comfortable.     Discussed issues about dc/communication with Dr. Poole,  Mark, and DANIELLE De La Rosa. Plan is to c/w scheduled family meeting tomorrow at 9am, and proceed with requests for picc and IV abx as per family hoping for dc tomorrow following meeting.     PAIN: no nonverbal signs of pain  DYSPNEA: no nonverbal signs of dyspnea      ROS:  Unable to gather further due to dementia    PHYSICAL EXAM:    Vital Signs Last 24 Hrs  T(C): 36.8 (05 Mar 2019 05:20), Max: 37.8 (04 Mar 2019 10:58)  T(F): 98.2 (05 Mar 2019 05:20), Max: 100 (04 Mar 2019 10:58)  HR: 78 (05 Mar 2019 05:20) (67 - 85)  BP: 161/66 (05 Mar 2019 05:20) (143/77 - 161/66)  BP(mean): --  RR: 17 (05 Mar 2019 05:20) (17 - 18)  SpO2: 100% (05 Mar 2019 05:20) (100% - 100%)  Daily     Daily Weight in k.6 (04 Mar 2019 10:58)    PPSV2:  20-30 %  FAST: 7C    General: Older female positioned upright in bed, awake, alert, NAD  Mental Status: alert, but unable to orient  HEENT: PERRLA  Lungs: CTA b/l  Cardiac: S1,S2, RRR  GI:soft, NT, ND + bowel sounds  Ext: L BKA present, contracted LUE  Neuro: unable to fully assess responds to touch       LABS:        149<H>  |  111<H>  |  13  ----------------------------<  145<H>  3.3<L>   |  32<H>  |  1.37<H>    Ca    8.4<L>      04 Mar 2019 11:45        Albumin:     Allergies    iodine (Other (Unknown))  sulfa drugs (Other (Unknown))    Intolerances      MEDICATIONS  (STANDING):  acetaminophen  Suppository .. 650 milliGRAM(s) Rectal once  ascorbic acid 500 milliGRAM(s) Oral daily  aspirin  chewable 81 milliGRAM(s) Oral daily  atorvastatin 40 milliGRAM(s) Oral at bedtime  calcium carbonate 1250 mG  + Vitamin D (OsCal 500 + D) 1 Tablet(s) Oral two times a day  desmopressin 0.01% Nasal 1 Spray(s) Nasal daily  dextrose 5%. 1000 milliLiter(s) (50 mL/Hr) IV Continuous <Continuous>  dextrose 50% Injectable 12.5 Gram(s) IV Push once  dextrose 50% Injectable 25 Gram(s) IV Push once  dextrose 50% Injectable 25 Gram(s) IV Push once  docusate sodium 100 milliGRAM(s) Oral two times a day  heparin  Injectable 5000 Unit(s) SubCutaneous every 8 hours  insulin glargine Injectable (LANTUS) 10 Unit(s) SubCutaneous at bedtime  insulin lispro (HumaLOG) corrective regimen sliding scale   SubCutaneous three times a day before meals  insulin lispro (HumaLOG) corrective regimen sliding scale   SubCutaneous at bedtime  melatonin 3 milliGRAM(s) Oral at bedtime  memantine 10 milliGRAM(s) Oral two times a day  metoprolol tartrate 12.5 milliGRAM(s) Oral two times a day  mirtazapine 15 milliGRAM(s) Oral at bedtime  piperacillin/tazobactam IVPB. 3.375 Gram(s) IV Intermittent every 8 hours  verapamil 80 milliGRAM(s) Oral daily  zinc sulfate 220 milliGRAM(s) Oral daily    MEDICATIONS  (PRN):  acetaminophen   Tablet .. 650 milliGRAM(s) Oral every 6 hours PRN Temp greater or equal to 38C (100.4F), Mild Pain (1 - 3)  dextrose 40% Gel 15 Gram(s) Oral once PRN Blood Glucose LESS THAN 70 milliGRAM(s)/deciliter  glucagon  Injectable 1 milliGRAM(s) IntraMuscular once PRN Glucose LESS THAN 70 milligrams/deciliter

## 2019-03-06 VITALS
DIASTOLIC BLOOD PRESSURE: 67 MMHG | RESPIRATION RATE: 18 BRPM | HEART RATE: 73 BPM | TEMPERATURE: 98 F | OXYGEN SATURATION: 100 % | SYSTOLIC BLOOD PRESSURE: 99 MMHG

## 2019-03-06 LAB
ANION GAP SERPL CALC-SCNC: 7 MMOL/L — SIGNIFICANT CHANGE UP (ref 5–17)
BUN SERPL-MCNC: 14 MG/DL — SIGNIFICANT CHANGE UP (ref 7–23)
CALCIUM SERPL-MCNC: 8.8 MG/DL — SIGNIFICANT CHANGE UP (ref 8.5–10.1)
CHLORIDE SERPL-SCNC: 112 MMOL/L — HIGH (ref 96–108)
CO2 SERPL-SCNC: 33 MMOL/L — HIGH (ref 22–31)
CREAT SERPL-MCNC: 1.47 MG/DL — HIGH (ref 0.5–1.3)
GLUCOSE SERPL-MCNC: 96 MG/DL — SIGNIFICANT CHANGE UP (ref 70–99)
HCT VFR BLD CALC: 32 % — LOW (ref 34.5–45)
HGB BLD-MCNC: 9.6 G/DL — LOW (ref 11.5–15.5)
MCHC RBC-ENTMCNC: 28.1 PG — SIGNIFICANT CHANGE UP (ref 27–34)
MCHC RBC-ENTMCNC: 30 GM/DL — LOW (ref 32–36)
MCV RBC AUTO: 93.6 FL — SIGNIFICANT CHANGE UP (ref 80–100)
NRBC # BLD: 0 /100 WBCS — SIGNIFICANT CHANGE UP (ref 0–0)
PLATELET # BLD AUTO: 213 K/UL — SIGNIFICANT CHANGE UP (ref 150–400)
POTASSIUM SERPL-MCNC: 3.4 MMOL/L — LOW (ref 3.5–5.3)
POTASSIUM SERPL-SCNC: 3.4 MMOL/L — LOW (ref 3.5–5.3)
RBC # BLD: 3.42 M/UL — LOW (ref 3.8–5.2)
RBC # FLD: 15.7 % — HIGH (ref 10.3–14.5)
SODIUM SERPL-SCNC: 152 MMOL/L — HIGH (ref 135–145)
WBC # BLD: 9.96 K/UL — SIGNIFICANT CHANGE UP (ref 3.8–10.5)
WBC # FLD AUTO: 9.96 K/UL — SIGNIFICANT CHANGE UP (ref 3.8–10.5)

## 2019-03-06 RX ORDER — ACETAMINOPHEN 500 MG
2 TABLET ORAL
Qty: 0 | Refills: 0 | COMMUNITY

## 2019-03-06 RX ORDER — INSULIN ASPART 100 [IU]/ML
0 INJECTION, SOLUTION SUBCUTANEOUS
Qty: 0 | Refills: 0 | COMMUNITY

## 2019-03-06 RX ORDER — DOCUSATE SODIUM 100 MG
2 CAPSULE ORAL
Qty: 0 | Refills: 0 | COMMUNITY

## 2019-03-06 RX ORDER — RIVAROXABAN 15 MG-20MG
1 KIT ORAL
Qty: 0 | Refills: 0 | COMMUNITY
Start: 2019-03-06

## 2019-03-06 RX ORDER — ASCORBIC ACID 60 MG
1 TABLET,CHEWABLE ORAL
Qty: 0 | Refills: 0 | COMMUNITY

## 2019-03-06 RX ORDER — INSULIN GLARGINE 100 [IU]/ML
0 INJECTION, SOLUTION SUBCUTANEOUS
Qty: 0 | Refills: 0 | COMMUNITY
Start: 2019-03-06

## 2019-03-06 RX ORDER — ZINC SULFATE TAB 220 MG (50 MG ZINC EQUIVALENT) 220 (50 ZN) MG
220 TAB ORAL
Qty: 0 | Refills: 0 | COMMUNITY

## 2019-03-06 RX ORDER — POTASSIUM CHLORIDE 20 MEQ
10 PACKET (EA) ORAL
Qty: 0 | Refills: 0 | Status: COMPLETED | OUTPATIENT
Start: 2019-03-06 | End: 2019-03-06

## 2019-03-06 RX ORDER — MEMANTINE HYDROCHLORIDE 10 MG/1
1 TABLET ORAL
Qty: 0 | Refills: 0 | COMMUNITY

## 2019-03-06 RX ORDER — SODIUM CHLORIDE 9 MG/ML
1000 INJECTION, SOLUTION INTRAVENOUS
Qty: 0 | Refills: 0 | Status: DISCONTINUED | OUTPATIENT
Start: 2019-03-06 | End: 2019-03-06

## 2019-03-06 RX ORDER — INSULIN DETEMIR 100/ML (3)
45 INSULIN PEN (ML) SUBCUTANEOUS
Qty: 0 | Refills: 0 | COMMUNITY

## 2019-03-06 RX ADMIN — Medication 12.5 MILLIGRAM(S): at 06:07

## 2019-03-06 RX ADMIN — SODIUM CHLORIDE 75 MILLILITER(S): 9 INJECTION, SOLUTION INTRAVENOUS at 10:15

## 2019-03-06 RX ADMIN — Medication 81 MILLIGRAM(S): at 12:27

## 2019-03-06 RX ADMIN — PIPERACILLIN AND TAZOBACTAM 25 GRAM(S): 4; .5 INJECTION, POWDER, LYOPHILIZED, FOR SOLUTION INTRAVENOUS at 10:14

## 2019-03-06 RX ADMIN — Medication 1 TABLET(S): at 06:06

## 2019-03-06 RX ADMIN — Medication 4: at 11:42

## 2019-03-06 RX ADMIN — Medication 500 MILLIGRAM(S): at 12:27

## 2019-03-06 RX ADMIN — Medication 80 MILLIGRAM(S): at 06:06

## 2019-03-06 RX ADMIN — MEMANTINE HYDROCHLORIDE 10 MILLIGRAM(S): 10 TABLET ORAL at 06:06

## 2019-03-06 RX ADMIN — ZINC SULFATE TAB 220 MG (50 MG ZINC EQUIVALENT) 220 MILLIGRAM(S): 220 (50 ZN) TAB at 12:27

## 2019-03-06 NOTE — PROGRESS NOTE ADULT - PROVIDER SPECIALTY LIST ADULT
Cardiology
Hospitalist
Infectious Disease
Palliative Care
Cardiology
Infectious Disease

## 2019-03-06 NOTE — GOALS OF CARE CONVERSATION - PERSONAL ADVANCE DIRECTIVE - NS PRO AD PATIENT TYPE ON CHART
Health Care Proxy (HCP)/Medical Orders for Life-Sustaining Treatment (MOLST)
Medical Orders for Life-Sustaining Treatment (MOLST)/Health Care Proxy (HCP)

## 2019-03-06 NOTE — GOALS OF CARE CONVERSATION - PERSONAL ADVANCE DIRECTIVE - WHAT MATTERS MOST
Giving the pt the best chance of survival, though it is quite clear that family is devoted to pt and also do not want her to suffer. They are reasonable and open to information, wanting to be fully informed before making decisions, which is reasonable.
Family is devoted to pt, only wanting her to be comfortable, without suffering, and treated with dignity. They know she is approaching the end of her life and have now come around to ensuring she does so with the best support possible.

## 2019-03-06 NOTE — PROGRESS NOTE ADULT - SUBJECTIVE AND OBJECTIVE BOX
cc: left facial droop  hpi: 85y female w/ PMH DM2, PVD s/p Left  AKA, dementia (non-verbal at baseline), Afib (not on AC), Major depression, HTN admitted on 2/18 from SNF for evaluation of left facial droop.  Chart reviewed- pt seen early this morning.  Mumbled a few words. Being fed breakfast.     3/2- no complaints. No events.  No fevers overnight.   3/3- no events, no complaints.  Discussed w/ daughters via phone.  They understand need for abx for wound and AC for afib- they are considering either xarelto or eliquis and will let me know tomorrow.  They state they have meeting w/ Dr. Wise tomorrow and then they will decide where they want mother to go.   3/4- no events, no complaints.  Family to meet w/ PC and then d/c planning.   3/5- no events.   3/6- family meeting w/ Palliative this morning- plan for hospice eval.     ros- limited due to dementia      Vital Signs Last 24 Hrs  T(C): 36.9 (05 Mar 2019 11:20), Max: 36.9 (04 Mar 2019 21:23)  T(F): 98.5 (05 Mar 2019 11:20), Max: 98.5 (04 Mar 2019 21:23)  HR: 85 (05 Mar 2019 11:20) (67 - 85)  BP: 127/66 (05 Mar 2019 11:20) (127/66 - 161/66)  BP(mean): --  RR: 17 (05 Mar 2019 11:20) (17 - 18)  SpO2: 100% (05 Mar 2019 11:20) (100% - 100%)      PHYSICAL EXAM:  General: chronic ill appearing elderly female in NAD, comfortable  Neuro: mumbling  HEENT: NCAT,   Neck: Soft and supple  Respiratory: CTA b/l, no w/r/r  Cardiovascular: S1 and S2, RRR  Gastrointestinal: +BS, soft, NTND  Extremities: No edema  Vascular/MSK: left AKA,    skin: sacral decub.            LABS: All Labs Reviewed:    03-03    146<H>  |  113<H>  |  14  ----------------------------<  137<H>  3.4<L>   |  27  |  1.31<H>    Ca    8.5      03 Mar 2019 06:06  Mg     2.1     03-02                                  10.0   10.35 )-----------( 282      ( 01 Mar 2019 05:46 )             32.4     03-02    150<H>  |  112<H>  |  15  ----------------------------<  227<H>  3.4<L>   |  30  |  1.44<H>    Ca    8.6      02 Mar 2019 05:48  Mg     2.1     03-02                                10.0   10.35 )-----------( 282      ( 01 Mar 2019 05:46 )             32.4     03-01    150<H>  |  115<H>  |  10  ----------------------------<  84  3.1<L>   |  29  |  1.22    Ca    8.3<L>      01 Mar 2019 05:46        G):  MEDICATIONS  (STANDING):  acetaminophen  Suppository .. 650 milliGRAM(s) Rectal once  ascorbic acid 500 milliGRAM(s) Oral daily  aspirin  chewable 81 milliGRAM(s) Oral daily  atorvastatin 40 milliGRAM(s) Oral at bedtime  calcium carbonate 1250 mG  + Vitamin D (OsCal 500 + D) 1 Tablet(s) Oral two times a day  desmopressin 0.01% Nasal 1 Spray(s) Nasal daily  dextrose 5%. 1000 milliLiter(s) (50 mL/Hr) IV Continuous <Continuous>  dextrose 50% Injectable 12.5 Gram(s) IV Push once  dextrose 50% Injectable 25 Gram(s) IV Push once  dextrose 50% Injectable 25 Gram(s) IV Push once  docusate sodium 100 milliGRAM(s) Oral two times a day  heparin  Injectable 5000 Unit(s) SubCutaneous every 8 hours  insulin glargine Injectable (LANTUS) 10 Unit(s) SubCutaneous at bedtime  insulin lispro (HumaLOG) corrective regimen sliding scale   SubCutaneous three times a day before meals  insulin lispro (HumaLOG) corrective regimen sliding scale   SubCutaneous at bedtime  melatonin 3 milliGRAM(s) Oral at bedtime  memantine 10 milliGRAM(s) Oral two times a day  metoprolol tartrate 12.5 milliGRAM(s) Oral two times a day  mirtazapine 15 milliGRAM(s) Oral at bedtime  piperacillin/tazobactam IVPB. 3.375 Gram(s) IV Intermittent every 8 hours  verapamil 80 milliGRAM(s) Oral daily  zinc sulfate 220 milliGRAM(s) Oral daily    MEDICATIONS  (PRN):  acetaminophen   Tablet .. 650 milliGRAM(s) Oral every 6 hours PRN Temp greater or equal to 38C (100.4F), Mild Pain (1 - 3)  dextrose 40% Gel 15 Gram(s) Oral once PRN Blood Glucose LESS THAN 70 milliGRAM(s)/deciliter  glucagon  Injectable 1 milliGRAM(s) IntraMuscular once PRN Glucose LESS THAN 70 milligrams/deciliter            ASSESSMENT and PLAN:    85y female w/     1. LUE weakness likely due to acute/subacute CVA  - unable to tolerate MRI  - repeat CT in 48hrs no changes  - aspirin, lipitor  - Cardio f/u appreciated- rec AC for Afib but family uncertain and will let me know 3/4   - -speech and swallow/dysphagia eval appreciated, puree diet    -fall px, aspiration px  -BP control    2. sacral decubitus ulcer, sacral Osteomyelitis  - MRI +  - vanco, zosyn renally dose    - ID f/u appreciated - d/c vanco 3/4, will continue iv abx, likely d/c w/ picc   - Surgical f/u appreciated- no intervention.  Local care.     3/6- family does not want iv picc/abx--> change to po       3. afib  - BB, CCb  - Cardio f/u appreciated- rec AC and family will decide by 3/4  start xarelto    4. PAD s/p left aka  - aspirin, statin    5. dementia, acute on chronic metabolic encephalopathy  - tx above   - supportive care    6.  T2D, hypoglycemia  Hemoglobin A1C, Whole Blood: 8.7: Method: Immunoassay  - stop lantus b/c epidoses hypoglycemia early am and pt not eating much  - Endo f/u appreciated  - ISS, fs  3/2-glucose elevated this morning, will adjust meds    3/3- glucose improving    7. hypernatremia, hx chronic DI undetermined etiology as per 2017 notes and pt on desmopressin   hypokalemia, SYLVIA on CKD3  3/6- comfort focus- no further labs     dvt px  heparin sc    Advance directives- family undecided.  Overall poor prognosis.  Palliative f/u appreciated- they are awaiting call back from daughter, Deepti.    3/4- daughter states she has meeting w/ palliative team tomorrow.  Requests wound re eval for possible vac.  Will decide on AC tomorrow.   3/5- PC meeting tomorrow.  Discussed d/c planning w/ team, DANIELLE. PICC for long term abx.   AC started.    3/6- hospice referral.  d/c planning

## 2019-03-06 NOTE — GOALS OF CARE CONVERSATION - PERSONAL ADVANCE DIRECTIVE - STAFF/SOCIAL WORKER
Steve Gorman LMSW (Palliative Care SW)
Steve Gorman Palliative SW
Steve Gorman Palliative SW
Steve Gorman LMSW (Palliative Care SW)

## 2019-03-06 NOTE — PROGRESS NOTE ADULT - REASON FOR ADMISSION
CVA

## 2019-03-06 NOTE — GOALS OF CARE CONVERSATION - PERSONAL ADVANCE DIRECTIVE - PHONE #
[phone] and [phone], respectively
[phone] and [phone], respectively
[phone] and [phone], respectively
[phone] and [phone], respectively

## 2019-03-06 NOTE — GOALS OF CARE CONVERSATION - PERSONAL ADVANCE DIRECTIVE - CONVERSATION DETAILS
Met with family to discuss medical issues and GOC. They expressed frustration with not hearing from hospitalist and unfortunately were not able to come to any decisions or truly consider making choices until this happens, called and message left. Despite this, they were pleasant and open to full review of issues. 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 She was then placed in [facility] with hopes of higher level of care. Prior to this, family describes her as an easy-going, loving person, who remains (though not in the same capacity) the matriarch of their family. It is important to them to both give her the best chance at survival and not to further her suffering. Thus, they were open to review of medical problems and potential plans moving forward.     Family was up-to-date on pt's advanced dementia, her likely stroke despite 2 CTs that did not show this and also knew that she was unable to tolerate an MRI for further workup. 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 They note previous conversations with Dr. England outlining options for either further treatment of this including debridement, possible colostomy and muscle flap, vs non-invasive, but less effective course of IV abx. They note understanding the risks of surgery but wanted more information from medical team on the viability of this. Spent some time explaining some of this including honest impression that given pt's overall debilitated state including malnutrition (despite her best efforts) and very low albumin, that she is unlikely to heal meaningfully from such an intervention, if survived, and more likely to have suffering as a result. Explained further that despite their understandable concern that there was some lapse of care at NH, that pt's advanced dementia alone is fraught with complications such as this, thus despite even the best wound care, some pts still end up with skin breakdown. They appreciated this perspective, not wanting pt to suffer further, but still noting they are a people of willis and reason. They want to hear concrete perspective from hospitalist and possibly also surgery before feeling fully confident in decisions for or against.     Laid out three possible plans as a result: 1) pursue surgical interventions and take risks that come with this in hopes of longevity (but keeping in mind QOL>Length of life; 2) long term abx which they agree is unlikely to be successful (hence them being presented with surgical option some time ago); and 3) full comfort focus on sx that arise without these interventions via hospice back at NH. Reviewed full hospice benefit and what this may look like for pt. Explained that it is our duty to both present options and give an opinion to help guide them, but ultimately we will honor whatever they choose. They were appreciative of this perspective and also recommendations for comfort, though again, not ready to choose this without more information on other options.     Also attempted to review advanced directives via MOLST which they recall starting at NH and choosing CPR option. Reviewed recommendations in this regard, namely DNR status and clarified that this had no bearing on other interventions as a Do Not Resuscitate is not and should never be equivalent to Do Not Treat, which they also appreciated. For now they need time to dixie this over, with code status question being quite a lot to consider, thus unable to consider remainder of form.    Ultimately, family wants to speak with hospitalist further about surgical option before being prepared to make any decisions, wanting all treatment to remain the same in the interim. Plan to touch base again tomorrow, family will call us with exact time when we will be able to do this.
Team met with pts family to discuss goals of care, assist with planning and provide supportive counseling. Pt. is a resident at [facility] and has been for about 5 years since her amputation.     Pts current medical condition and goals of care discussed. Pts family had a good understanding of pts medical issues. They discussed their previous conversation with Dr. England  regarding unstageable sacral ulcer and explained options such as debridement, possible colostomy and muscle flap, vs course of IV antibiotics that may be less effective. Pts family expressed understanding the risks of surgery but wanted more information from medical team regarding risks/benefits ect.     We discussed all options with pts family including pursing surgery and taking the risks that come with surgery hoping this prolongs her life but keeping in mind Quality of life as well VS Long term antibiotics which they agreed would unlikely be successful VS Focusing completely on comfort with the support of hospice at [facility]. We explained hospice philosophy and services in detail.     Team briefly reviewed MOLST and also discussed pts wishes regarding resuscitation and intubation. Pts family was not ready to make this decision and would like to think this over. They are aware that no decision is a decision as pt. would receive CPR if needed. Pt. remains full code at this time     Pts family would like to speak with hospitalist further about surgical option before they make any decisions. Team to follow up with pts family tomorrow, they will call us to arrange a time. Plan to be further determined.  Emotional support provided.
Team met with pts family to follow up and help determine plan. We reviewed pts current medical condition and hospital course. Pts family expressed frustration with hospital course and wanted a clear understanding of options. They are aware surgery is not an option.     Team presented options including return to [place] (where pt. is LT resident) with LT antibiotics VS No Antibiotics and comfort focus with the support of Hospice at [facility]. After much discussion and exploring each option in detail pts family has decided to not pursue LT antibiotics and have pt. return to [place] with a comfort focus and Hospice services there. Pts daughter expressed how she does not want pt. to suffer and feels that she is tired.     We reviewed and completed MOLST. MOLST states DNR/DNI, Comfort Measures, Do not send to hospital, No feeding tube, No IV Fluids, No Dialysis, No Transfusions, No ICU, No Pressors.     Plan at this time is for d/c back to [place] with hospice services. Brenda L Floor SW to place referral. Emotional support provided. Our team will continue to follow.
Met with family in follow up to last [facility] conversation. Since then they recalled having surgical consult, though missing the call due to timing and some frustrations with whole process. We thus, shared surgical perspective noting that while debridement, flap, and colostomy can be considered for some pts, Ms. Lockhart is unlikely to benefit from this and thus this intervention was not recommended/offered. Agreed with Dr. Roberts's shared impression that there is concern that even with prolonged IV abx (clarified that this is being offered to give pt some chance of improvement despite odds), that pt's wound is unlikely to heal meaningfully in light of her overall debility. They reiterated their concerns about return to NH and lack of confidence that this wound would be tended to properly, inquiring about outpt wound care referrals. We discussed the fact that the pt is at the end stages of dementia and certain things are known complications- including infections, malnutrition (even with adequate intake), poor protein levels (increasing risk for poor healing and also 3rd spacing with fluids), dehydration at times due to inconsistency of intake, and eventually the culmination of all of this in a poor prognosis. They were, as always, glad to have this information. After full discussion of the likelihood of further interventions like prolonged abx changing the overall outcome, even with the best of wound care, they decided against pursuing further IV abx and instead opting for comfort.     Deepti/HCP was tearful, sharing that she knows her mother is tired, and ultimately not wanting her to suffer. She tearfully shared how she has declined over the past 5 years and how much she does not want to put the promise of dignified care in shaky hands. Thus, we implored them to reconsider a hospice plan, explaining the difference between home (at [facility]) hospice, and inpt hospice (at hospice facility, not eligible for this yet). Explained also that hospice nurses are experts at wound care and will serve as the 2nd line of support that they were craving to give the pt's skin the best chance of maintaining some integrity. Noted that their focus is comfort and thus, they would have to be agreeable to forgoing further procedures or return to the hospital (with the caveat of fall or fractured bone). They mulled this over and agreed that at this point they do not want to take the chance of going 28 days with care that they are not trusting of and likely still end up with a comfort plan via hospice anyway. Thus, they agreed to hospice referral for home hospice back at [facility].     Given this plan also completed MOLST with them with choices c/w comfort focus. See below for list of choices. Plan is now for no picc/midline, no further IV abx, no IVf (can stop upon dc), ok to c/w AC for stroke prevention, and pursuit of comfort plan via hospice.    Shared above with Dr. Poole,  Mark, floor DANIELLE Boss, and RN Andreina. Floor DANIELLE moving forward with abovementioned plan and Dr. Poole reaching out to ID to let them know as well.

## 2019-03-06 NOTE — GOALS OF CARE CONVERSATION - PERSONAL ADVANCE DIRECTIVE - CONVERSATION/DISCUSSION
Prognosis/Treatment Options/Diagnosis/MOLST Discussed
Diagnosis/Prognosis/MOLST Discussed/Treatment Options

## 2019-03-06 NOTE — PROGRESS NOTE ADULT - SUBJECTIVE AND OBJECTIVE BOX
HPI: Pt seen and examined this am in follow up for sx and GOC, no family at bedside yet. Pt awake, alert, and appears comfortable. Unable to contribute to hpi due to dementia.     Family meeting planned for 9am. See [facility] note to follow.     PAIN: no nonverbal signs of pain  DYSPNEA: no nonverbal signs of dyspnea    ROS:  Unable to gather further due to dementia    PHYSICAL EXAM:    Vital Signs Last 24 Hrs  T(C): 36.8 (06 Mar 2019 06:04), Max: 37.2 (05 Mar 2019 20:49)  T(F): 98.3 (06 Mar 2019 06:04), Max: 99 (05 Mar 2019 20:49)  HR: 74 (06 Mar 2019 06:04) (70 - 85)  BP: 137/63 (06 Mar 2019 06:04) (115/48 - 143/80)  BP(mean): --  RR: 17 (06 Mar 2019 06:04) (17 - 17)  SpO2: 100% (06 Mar 2019 06:04) (100% - 100%)  Daily       PPSV2:  20-30 %  FAST: 7C    General: Older female positioned upright in bed, awake, alert, NAD  Mental Status: alert, but unable to orient  HEENT: PERRLA  Lungs: CTA b/l  Cardiac: S1,S2, RRR  GI:soft, NT, ND + bowel sounds  Ext: L BKA present, contracted LUE  Neuro: unable to fully assess, does not follow commands, but responds to touch     LABS:                        9.6    9.96  )-----------( 213      ( 06 Mar 2019 05:50 )             32.0     03-06    152<H>  |  112<H>  |  14  ----------------------------<  96  3.4<L>   |  33<H>  |  1.47<H>    Ca    8.8      06 Mar 2019 05:57        Albumin:     Allergies    iodine (Other (Unknown))  sulfa drugs (Other (Unknown))    Intolerances      MEDICATIONS  (STANDING):  acetaminophen  Suppository .. 650 milliGRAM(s) Rectal once  ascorbic acid 500 milliGRAM(s) Oral daily  aspirin  chewable 81 milliGRAM(s) Oral daily  atorvastatin 40 milliGRAM(s) Oral at bedtime  calcium carbonate 1250 mG  + Vitamin D (OsCal 500 + D) 1 Tablet(s) Oral two times a day  desmopressin 0.01% Nasal 1 Spray(s) Nasal daily  dextrose 5%. 1000 milliLiter(s) (50 mL/Hr) IV Continuous <Continuous>  dextrose 50% Injectable 12.5 Gram(s) IV Push once  dextrose 50% Injectable 25 Gram(s) IV Push once  dextrose 50% Injectable 25 Gram(s) IV Push once  docusate sodium 100 milliGRAM(s) Oral two times a day  insulin glargine Injectable (LANTUS) 10 Unit(s) SubCutaneous at bedtime  insulin lispro (HumaLOG) corrective regimen sliding scale   SubCutaneous three times a day before meals  insulin lispro (HumaLOG) corrective regimen sliding scale   SubCutaneous at bedtime  melatonin 3 milliGRAM(s) Oral at bedtime  memantine 10 milliGRAM(s) Oral two times a day  metoprolol tartrate 12.5 milliGRAM(s) Oral two times a day  mirtazapine 15 milliGRAM(s) Oral at bedtime  piperacillin/tazobactam IVPB. 3.375 Gram(s) IV Intermittent every 8 hours  rivaroxaban 15 milliGRAM(s) Oral every 24 hours  verapamil 80 milliGRAM(s) Oral daily  zinc sulfate 220 milliGRAM(s) Oral daily    MEDICATIONS  (PRN):  acetaminophen   Tablet .. 650 milliGRAM(s) Oral every 6 hours PRN Temp greater or equal to 38C (100.4F), Mild Pain (1 - 3)  dextrose 40% Gel 15 Gram(s) Oral once PRN Blood Glucose LESS THAN 70 milliGRAM(s)/deciliter  glucagon  Injectable 1 milliGRAM(s) IntraMuscular once PRN Glucose LESS THAN 70 milligrams/deciliter

## 2019-03-06 NOTE — GOALS OF CARE CONVERSATION - PERSONAL ADVANCE DIRECTIVE - FAMILY/CHILD(REN)
Deepti and Deepti's 
daughter Deepti, son in law
two daughters, son in law
2 daughters and son-in-law

## 2019-03-06 NOTE — PROGRESS NOTE ADULT - ASSESSMENT
85F with hx of DM Type II on Insulin, PVD s/p Left  AKA, dementia (non-verbal at baseline), Afib (not on AC), Major depression, HTN admitted on 2/18 from snf for evaluation of left facial droop. Upon admission patient was febrile; History per medical record as patient unable to provide history; of note she has foul smelling sacral ulcer being cared for by snf.  1. Patient admitted with altered mental status, fever found to have most likely sacral osteomyelitis, large ulcer over sacrum; also noted with leukocytosis most likely reactive to infection  - Palliative care evaluation appreciated  - to complete short course of po doxycycline  - for hospice and comfort care  2. other issues: DM Type II on Insulin, PVD s/p Left  AKA, dementia (non-verbal at baseline), Afib (not on AC), Major depression, HTN   - per medicine
85F with hx of DM Type II on Insulin, PVD s/p Left  AKA, dementia (non-verbal at baseline), Afib (not on AC), Major depression, HTN admitted on 2/18 from snf for evaluation of left facial droop. Upon admission patient was febrile; History per medical record as patient unable to provide history; of note she has foul smelling sacral ulcer being cared for by snf.  1. Patient admitted with altered mental status, fever found to have most likely sacral osteomyelitis, large ulcer over sacrum; also noted with leukocytosis most likely reactive to infection  - follow up cultures   - serial cbc and monitor temperature   - iv hydration and supportive care   - reviewed prior medical records to evaluate for resistant or atypical pathogens   - day #10 vancomycin and zosyn  - tolerating antibiotics without rashes or side effects   - vancomycin trough is appropriate  - wound care, though cure of this wound most likely will be difficult at best  - surgical evaluation noted; consideration for wound vac  2. other issues: DM Type II on Insulin, PVD s/p Left  AKA, dementia (non-verbal at baseline), Afib (not on AC), Major depression, HTN   - per medicine
A/P: 85F with hx of DM Type II on Insulin, PVD s/p Left  AKA, dementia (non-verbal at baseline), Afib (not on AC), Major depression, HTN sent from [facility] for unresponsiveness and left sided weakness with left facial droop.    1. CVA/TIA? In setting of afib and not on LTA. Awaiting MRI/MRA, 2Decho- Nl LV fxn.   Neuro following. If ok by neuro, would start anticoagulation to prevent CVA recurrence.   CHADS Vasc score is 8 and very high.   Cont tele for now. Would hold on CHARLEY in this elderly pt with known afib.   Cont asa for some CVA proph for now.    2. HTN. Allow for some permissive HTN. Cont current regimen for now.   -150s.     3. PVD. Cont asa/statin. Conservative medical mgmt.     4. Dyslipidemia. Cont statin.     5. DVT proph, replete lytes as needed.
Assessment and Recommendation:   · Assessment		  Patient is a  82 y/o F with PMHx of DM Type II on Insulin, PVD s/p Left  BKA, dementia, Hypernatremia, Afib on ASA 81mg, Major depression, HTN who presented to the ED with new onset L facial droop, LUE weakness that started around 8am. HCT neg for hemorrhage or acute infarct.     A/P: acute stroke vs febrile syndrome   - Admit to telemetry  - No IV tpa given at this time. Fever workup in progress. In discussion with patient Daughter Deepti she deferred tpa and expressed understanding of the risks and benefits of tpa use.  - Follow up Blood Cultures  CH AF with high Franklin VSS scores  8 not on AC  - Cardio consult appreciated  - TTE  - Continue ASA 81mg with statin  - Dysphagia screen  - Full dose Statin  - SQH, SCDs for DVT prophylaxis  - MRI/A brain-carotids w/o gado pending  PT/Ot  Swallow consult  will f/u
Assessment and Recommendation:   · Assessment		  Patient is a  82 y/o F with PMHx of DM Type II on Insulin, PVD s/p Left  BKA, dementia, Hypernatremia, Afib on ASA 81mg, Major depression, HTN who presented to the ED with new onset L facial droop, LUE weakness that started around 8am. HCT neg for hemorrhage or acute infarct.     A/P: acute stroke vs febrile syndrome   - Admit to telemetry  - No IV tpa given at this time. Fever workup in progress. In discussion with patient Daughter Deepti she deferred tpa and expressed understanding of the risks and benefits of tpa use.  - Follow up Blood Cultures  CH AF with high Franklin VSS scores  8 not on AC  - Cardio consult appreciated  - TTE  - Continue ASA 81mg with statin  - Dysphagia screen  - Full dose Statin  - SQH, SCDs for DVT prophylaxis  - MRI/A brain-carotids w/o gado pending  PT/Ot  Swallow consult  will f/u
Assessment and Recommendation:   · Assessment		  Patient is a  84 y/o F with PMHx of DM Type II on Insulin, PVD s/p Left  BKA, dementia, Hypernatremia, Afib on ASA 81mg, Major depression, HTN who presented to the ED with new onset L facial droop, LUE weakness that started around 8am. HCT neg for hemorrhage or acute infarct.     A/P: acute stroke vs febrile syndrome   Clinically Pt has acute RT CVA with L hemiparesis  - Admit to telemetry  - No IV tpa given at this time. Fever workup in progress. In discussion with patient Daughter Deepti she deferred tpa and expressed understanding of the risks and benefits of tpa use.  - Follow up Blood Cultures  CH AF with high Franklin VSS scores  8 not on AC  - Cardio consult appreciated  - TTE  - Continue ASA 81mg with statin  - Dysphagia screen done on soft diet  - Full dose Statin  - SQH, SCDs for DVT prophylaxis  - MRI/A brain-carotids w/o gado pt unable lie down on table.  Repeat CT did not reveal any acute pathology.  Also dx with Sacral decubiti with Osteo on IV antibiotics.  PT/Ot  Swallow consult done.  will f/u
· Assessment		  Patient is a  84 y/o F with PMHx of DM Type II on Insulin, PVD s/p Left  BKA, dementia, Hypernatremia, Afib on ASA 81mg, Major depression, HTN who presented to the ED with new onset L facial droop, LUE weakness that started around 8am. HCT neg for hemorrhage or acute infarct.     A/P: acute stroke vs febrile syndrome   Clinically Pt has acute RT CVA with L hemiparesis  - Admit to telemetry  - No IV tpa given at this time. Fever workup in progress. In discussion with patient Daughter Deepti she deferred tpa and expressed understanding of the risks and benefits of tpa use.  - Follow up Blood Cultures  CH AF with high Franklin VSS scores  8 not on AC  - Cardio consult appreciated  - TTE  - Continue ASA 81mg with statin  - Dysphagia screen done on soft diet  - Full dose Statin  - SQH, SCDs for DVT prophylaxis  - MRI/A brain-carotids w/o gado pt unable lie down on table.  Repeat CT did not reveal any acute pathology.  Also dx with Sacral decubiti with Osteo on IV antibiotics.  PT/Ot  Swallow consult done on Po soft diet  F/u by On call neurology as needed.
1. CVA-  In setting of afib and not on LTA.   Pt unable to tolerate MRI so far  2Decho- Nl LV fxn.   Discussed with Dr Suresh neurology about anticoauglation and the suggestion from them was to wair for few days and repeat head imaging needed prior to starting AC.  CHADS Vasc score is 8 and very high.   Cont tele for now. Would hold on CHARLEY in this elderly pt with known afib.     2. HTN- BP goals per neurology team.     3. PVD. Cont asa/statin. Conservative medical mgmt.     4. Dyslipidemia. Cont statin.     5. DVT proph, replete lytes as needed.     Other medical issues- Management per primary team.   Thank you for allowing me to participate in the care of this patient. Please feel free to contact me with any questions.
85F with acute CVA and fever      *Acute/Subacute CVA: left sided facial droop and LUE weakness, s/p left AKA  -Neuro Cx appreciated   -ASA, Lipitor  -TTE  -Cardiology eval appreciated  -speech and swallow/dysphagia eval  -fall px, aspiration px  -PT eval  -BP control  -MRI brain, MRA head and neck      *Fever, Stage 4 sacral ulcer; r/o OM, aspiration PNA.   -vanco and zosyn  -ID Cx  -f/u all cultures, blood/urine  -UA not strongly suggestive of UTI  -f/u CXR  -f/u MRI pelvis to assess for OM  -tylenol prn fever  -gentle IVF hydration  -rvp (-)      *AFib, not on AC  -unsure why patient is not anticoagulated, NH unaware, will discuss with family  -cardiology eval  -c/w metoprolol, verapamil  -rate controlled      *PVD s/p left AKA  -fall px  -c/w ASA      *HTN:  -c/w metoprolol, verapamil      *Dementia  -supportive care  -aspiration/fall px      *Depression  -mood appears stable      *DVT px:  -Heparin sq
85F with acute CVA and fever    # Acute/Subacute CVA: left sided facial droop and LUE weakness  # Hx of Left AKA  -Neuro Cx appreciated   -ASA, Lipitor  -TTE  -Cardiology eval appreciated, high CHADS score for afib  -speech and swallow/dysphagia eval appreciated  -fall px, aspiration px  -BP control  -MRI brain, MRA head and neck unable to perform      *Fever, Stage 4 sacral ulcer; r/o OM, aspiration PNA.   -vanco and zosyn  -ID Cx  -f/u all cultures, blood/urine  -UA not strongly suggestive of UTI  -f/u CXR  -f/u MRI pelvis to assess for OM  -tylenol prn fever  -gentle IVF hydration  -rvp (-)      *AFib, not on AC  -unsure why patient is not anticoagulated, discussed with daughter in detail over the phone and family in the room, r/b of AC, they want to still think about it. They are unsure why she has not been on anticoagualtion  -cardiology eval appreciated, high CHADS score, would AC if ok with neurology  - d/w , will AC after repeat CT of the head in AM tomm  -c/w metoprolol, verapamil  -rate controlled      *PVD s/p left AKA  -fall px  -c/w ASA      *HTN:  -c/w metoprolol, verapamil      *Dementia  -supportive care  -aspiration/fall px      *Depression  -mood appears stable      *DVT px:  -Heparin sq
85F with acute CVA and fever    # Acute/Subacute CVA: with some LUE weakness  # Hx of Left AKA  - Patient was unable to tolerate MRI of the head/neck, Hence repeat CT of head after 48 hours did not show any changes  - ASA, Lipitor  -Cardiology eval appreciated, high CHADS score for afib, should be AC, d/w  who discussed with family and they want to wait for AC at this time  -speech and swallow/dysphagia eval appreciated, puree diet  -fall px, aspiration px  -BP control    * Osteomyelitis of the sacral area  -vanco and zosyn  -ID Cx appreciated, d/w   -f/u all cultures, blood/urine  - MRI of Pelvis with OM  -tylenol prn fever    *AFib, not on AC  - discussed with daughter in detail over the phone and family in the room, r/b of AC, they want to still think about it.   -c/w metoprolol, verapamil  -rate controlled  -f/u cardio    *PVD s/p left AKA  -fall px  -c/w ASA    *HTN:  -c/w metoprolol, verapamil    *Dementia  -supportive care  -aspiration/fall px    *Depression  -mood appears stable    *DVT px:  -Heparin sq    * Hypoglycemia: ? even on D5  - recheck A1c (last one noted to be 8)  - monitor off lantus  - endo evaluation  - check AM cortisol
85F with hx of DM Type II on Insulin, PVD s/p Left  AKA, dementia (non-verbal at baseline), Afib (not on AC), Major depression, HTN admitted on 2/18 from snf for evaluation of left facial droop. Upon admission patient was febrile; History per medical record as patient unable to provide history; of note she has foul smelling sacral ulcer being cared for by snf.  1. Patient admitted with altered mental status, fever found to have most likely sacral osteomyelitis, large ulcer over sacrum; also noted with leukocytosis most likely reactive to infection  - follow up cultures   - serial cbc and monitor temperature   - iv hydration and supportive care   - reviewed prior medical records to evaluate for resistant or atypical pathogens   - day #11 vancomycin and zosyn  - tolerating antibiotics without rashes or side effects   - vancomycin trough is appropriate  - wound care, though cure of this wound most likely will be difficult at best  - surgical evaluation noted; consideration for wound vac  2. other issues: DM Type II on Insulin, PVD s/p Left  AKA, dementia (non-verbal at baseline), Afib (not on AC), Major depression, HTN   - per medicine
85F with hx of DM Type II on Insulin, PVD s/p Left  AKA, dementia (non-verbal at baseline), Afib (not on AC), Major depression, HTN admitted on 2/18 from snf for evaluation of left facial droop. Upon admission patient was febrile; History per medical record as patient unable to provide history; of note she has foul smelling sacral ulcer being cared for by snf.  1. Patient admitted with altered mental status, fever found to have most likely sacral osteomyelitis, large ulcer over sacrum; also noted with leukocytosis most likely reactive to infection  - follow up cultures   - serial cbc and monitor temperature   - iv hydration and supportive care   - reviewed prior medical records to evaluate for resistant or atypical pathogens   - day #14 vancomycin and zosyn  - tolerating antibiotics without rashes or side effects   - had elevated vancomycin trough and creatinine is rising; will stop vancomycin all together  - wound care, though cure of this wound most likely will be difficult at best  - surgical evaluation noted; consideration for wound vac  2. other issues: DM Type II on Insulin, PVD s/p Left  AKA, dementia (non-verbal at baseline), Afib (not on AC), Major depression, HTN   - per medicine
85F with hx of DM Type II on Insulin, PVD s/p Left  AKA, dementia (non-verbal at baseline), Afib (not on AC), Major depression, HTN admitted on 2/18 from snf for evaluation of left facial droop. Upon admission patient was febrile; History per medical record as patient unable to provide history; of note she has foul smelling sacral ulcer being cared for by snf.  1. Patient admitted with altered mental status, fever found to have most likely sacral osteomyelitis, large ulcer over sacrum; also noted with leukocytosis most likely reactive to infection  - follow up cultures   - serial cbc and monitor temperature   - iv hydration and supportive care   - reviewed prior medical records to evaluate for resistant or atypical pathogens   - day #15 zosyn  - tolerating antibiotics without rashes or side effects   - had elevated vancomycin trough and creatinine is rising; stopped vancomycin all together  - wound care, though cure of this wound most likely will be difficult at best  - surgical evaluation noted; consideration for wound vac  - will order midline and complete 28 more days ertapenem; doubt this will be curative as overall situation is grime, given patient bed bound, poor nutrition, etc  2. other issues: DM Type II on Insulin, PVD s/p Left  AKA, dementia (non-verbal at baseline), Afib (not on AC), Major depression, HTN   - per medicine
85F with hx of DM Type II on Insulin, PVD s/p Left  AKA, dementia (non-verbal at baseline), Afib (not on AC), Major depression, HTN admitted on 2/18 from snf for evaluation of left facial droop. Upon admission patient was febrile; History per medical record as patient unable to provide history; of note she has foul smelling sacral ulcer being cared for by snf.  1. Patient admitted with altered mental status, fever found to have most likely sacral osteomyelitis, large ulcer over sacrum; also noted with leukocytosis most likely reactive to infection  - follow up cultures   - serial cbc and monitor temperature   - iv hydration and supportive care   - reviewed prior medical records to evaluate for resistant or atypical pathogens   - day #2 vancomycin and zosyn  - tolerating antibiotics without rashes or side effects   - check vancomycin trough prior to fourth dose   - wound care, though cure of this wound most likely will be difficult at best  - consideration for comfort care  2. other issues: DM Type II on Insulin, PVD s/p Left  AKA, dementia (non-verbal at baseline), Afib (not on AC), Major depression, HTN   - per medicine
85F with hx of DM Type II on Insulin, PVD s/p Left  AKA, dementia (non-verbal at baseline), Afib (not on AC), Major depression, HTN admitted on 2/18 from snf for evaluation of left facial droop. Upon admission patient was febrile; History per medical record as patient unable to provide history; of note she has foul smelling sacral ulcer being cared for by snf.  1. Patient admitted with altered mental status, fever found to have most likely sacral osteomyelitis, large ulcer over sacrum; also noted with leukocytosis most likely reactive to infection  - follow up cultures   - serial cbc and monitor temperature   - iv hydration and supportive care   - reviewed prior medical records to evaluate for resistant or atypical pathogens   - day #4 vancomycin and zosyn  - tolerating antibiotics without rashes or side effects   - vancomycin trough is appropriate  - wound care, though cure of this wound most likely will be difficult at best  - consideration for comfort care; discussed briefly with family at bedside  2. other issues: DM Type II on Insulin, PVD s/p Left  AKA, dementia (non-verbal at baseline), Afib (not on AC), Major depression, HTN   - per medicine
85F with hx of DM Type II on Insulin, PVD s/p Left  AKA, dementia (non-verbal at baseline), Afib (not on AC), Major depression, HTN admitted on 2/18 from snf for evaluation of left facial droop. Upon admission patient was febrile; History per medical record as patient unable to provide history; of note she has foul smelling sacral ulcer being cared for by snf.  1. Patient admitted with altered mental status, fever found to have most likely sacral osteomyelitis, large ulcer over sacrum; also noted with leukocytosis most likely reactive to infection  - follow up cultures   - serial cbc and monitor temperature   - iv hydration and supportive care   - reviewed prior medical records to evaluate for resistant or atypical pathogens   - day #5 vancomycin and zosyn  - tolerating antibiotics without rashes or side effects   - vancomycin trough is appropriate  - wound care, though cure of this wound most likely will be difficult at best  - consideration for comfort care; discussed briefly with family at bedside  2. other issues: DM Type II on Insulin, PVD s/p Left  AKA, dementia (non-verbal at baseline), Afib (not on AC), Major depression, HTN   - per medicine
85F with hx of DM Type II on Insulin, PVD s/p Left  AKA, dementia (non-verbal at baseline), Afib (not on AC), Major depression, HTN admitted on 2/18 from snf for evaluation of left facial droop. Upon admission patient was febrile; History per medical record as patient unable to provide history; of note she has foul smelling sacral ulcer being cared for by snf.  1. Patient admitted with altered mental status, fever found to have most likely sacral osteomyelitis, large ulcer over sacrum; also noted with leukocytosis most likely reactive to infection  - follow up cultures   - serial cbc and monitor temperature   - iv hydration and supportive care   - reviewed prior medical records to evaluate for resistant or atypical pathogens   - day #6 vancomycin and zosyn  - tolerating antibiotics without rashes or side effects   - vancomycin trough is appropriate  - wound care, though cure of this wound most likely will be difficult at best  - consideration for comfort care; discussed briefly with family at bedside  2. other issues: DM Type II on Insulin, PVD s/p Left  AKA, dementia (non-verbal at baseline), Afib (not on AC), Major depression, HTN   - per medicine
85F with hx of DM Type II on Insulin, PVD s/p Left  AKA, dementia (non-verbal at baseline), Afib (not on AC), Major depression, HTN admitted on 2/18 from snf for evaluation of left facial droop. Upon admission patient was febrile; History per medical record as patient unable to provide history; of note she has foul smelling sacral ulcer being cared for by snf.  1. Patient admitted with altered mental status, fever found to have most likely sacral osteomyelitis, large ulcer over sacrum; also noted with leukocytosis most likely reactive to infection  - follow up cultures   - serial cbc and monitor temperature   - iv hydration and supportive care   - reviewed prior medical records to evaluate for resistant or atypical pathogens   - day #9 vancomycin and zosyn  - tolerating antibiotics without rashes or side effects   - vancomycin trough is appropriate  - wound care, though cure of this wound most likely will be difficult at best  - surgical evaluation noted; consideration for wound vac  2. other issues: DM Type II on Insulin, PVD s/p Left  AKA, dementia (non-verbal at baseline), Afib (not on AC), Major depression, HTN   - per medicine
85y old Female coming from [facility] with pertinent hx of profound dementia(currently non-verbal), afib (not on AC), PAD (s/p L BKA), DM2, admitted 2/18 for facial droop and LUE weakness. . Pt had a NIHSS of 18 but was not a candidate for TPA. CTH x2 showing no acute pathology and unable to do MRI. Also found to have leukocytosis with sacral OM as presumed cause. Palliative Medicine consulted to help with establishment of goals of care discussion with family.     1) CVA/ hx of dementia  - acute sx without pathology on imaging  - neurology notes appreciated- Rt acute stroke no tpa given as per conversation between neuro and family  - family still deciding about anticoagulation  -c/w with aspirin and statin  -fall and aspiration precautions  - c/w dementia meds    2) Hx of afib  - cardiology notes appreciated  - c/w verapamil for rate control  -CHADS VASC noted to be 8  -may benefit from anticoagulation but family on the fence    3) Debility  - PPSV score of 20-30  - as per NH records bedbound at baseline, incontinent of urine and stool, with few words  -seen and evaluated by speech and swallow, with follow up today as well: no signs of aspiration, c/w puree diet  - dietary notes appreciated indicating severe protein calorie malnutrition    4) Osteomyelitis pf sacrum  -currently on vanco and zosyn #7  -local wound care as tolerated  -trend cbc  -ID following     5) Prognosis  -poor  -advanced age, dementia (FAST 7c, bedbound), severe malnutrition, cva, fully dependent for ADLs, PPS<40%, albumin 1.7, hospice eligible    6) Goals of care/ advanced directives  -pt with no decison making capacity  -  HCP in [facility]: 1) Deepti Herron [phone]; and 2) Janee Parker [phone]  - MOLST page one on chart indicating CPR only, still wanting to keep this for now until they have time to discuss amongst themselves  - GOC meeting held today- all issues reviewed, family unable to make decisions because they are still awaiting conversation with Dr. Hargrove. Dr. Hargrove called and message left. See Goc note for further details. *Spent 46 minutes discussing GOC with family including Advance care planning, explanation and discussion of advance directives, reviewed all treatment/dispo options, and preliminary review of MOLST form. See GOC note for further details.    Thank you for including us in Ms. Lockhart's care. Will continue to follow with you.    Mariano Wise MD  Palliative Care Attending .
85y old Female coming from [facility] with pertinent hx of profound dementia(currently non-verbal), afib (not on AC), PAD (s/p L BKA), DM2, admitted 2/18 for facial droop and LUE weakness. . Pt had a NIHSS of 18 but was not a candidate for TPA. CTH x2 showing no acute pathology and unable to do MRI. Also found to have leukocytosis with sacral OM as presumed cause. Palliative Medicine consulted to help with establishment of goals of care discussion with family.     1) CVA/ hx of dementia  - acute sx without pathology on imaging  - neurology notes appreciated- Rt acute stroke no tpa given as per conversation between neuro and family  - family still deciding about anticoagulation  - c/w with aspirin and statin  - fall and aspiration precautions  - c/w dementia meds    2) Hx of afib  - cardiology notes appreciated  - c/w verapamil for rate control  -CHADS VASC noted to be 8  -may benefit from anticoagulation but family on the fence    3) Debility  - PPSV score of 20-30  - as per NH records bedbound at baseline, incontinent of urine and stool, with few words  -seen and evaluated by speech and swallow, with follow up today as well: no signs of aspiration, c/w puree diet  - dietary notes appreciated indicating severe protein calorie malnutrition    4) Osteomyelitis pf sacrum  -currently on vanco and zosyn #7  -local wound care as tolerated  -trend cbc  -ID following   - surgical consult appreciated- not candidate for muscle flap, surgical team looking into possibility of wound VAC    5) Prognosis  -poor  -advanced age, dementia (FAST 7c, bedbound), severe malnutrition, cva, fully dependent for ADLs, PPS<40%, albumin 1.7, hospice eligible    6) Goals of care/ advanced directives  -pt with no decison making capacity  -  HCP in [employer]: 1) Deepti Herron [phone]; and 2) Janee Parker [phone]  - MOLST page one on chart indicating CPR only, still wanting to keep this for now until they have time to discuss amongst themselves  - GOC meeting held 2/26- all issues reviewed, family unable to make decisions because they are still awaiting all of their options including surgical impression. Will call tomorrow to touch base once they have heard all options. See Goc note for further details.     Thank you for including us in Ms. Lockhart's care. Will continue to follow with you.    Mariano Wise MD  Palliative Care Attending .
85y old Female coming from [facility] with pertinent hx of profound dementia(currently non-verbal), afib (not on AC), PAD (s/p L BKA), DM2, admitted 2/18 for facial droop and LUE weakness. . Pt had a NIHSS of 18 but was not a candidate for TPA. CTH x2 showing no acute pathology and unable to do MRI. Also found to have leukocytosis with sacral OM as presumed cause. Palliative Medicine consulted to help with establishment of goals of care discussion with family.     1) CVA/ hx of dementia  - acute sx without pathology on imaging  - neurology notes appreciated- Rt acute stroke no tpa given as per conversation between neuro and family  - family still deciding about anticoagulation  - c/w with aspirin and statin  - fall and aspiration precautions  - c/w dementia meds    2) Hx of afib  - cardiology notes appreciated  - c/w verapamil for rate control  -CHADS VASC noted to be 8  -may benefit from anticoagulation but family on the fence    3) Debility  - PPSV score of 20-30  - as per NH records bedbound at baseline, incontinent of urine and stool, with few words  -seen and evaluated by speech and swallow, with follow up today as well: no signs of aspiration, c/w puree diet  - dietary notes appreciated indicating severe protein calorie malnutrition    4) Osteomyelitis pf sacrum  -currently on vanco and zosyn #7  -local wound care as tolerated  -trend cbc  -ID following   - surgical consult appreciated- not candidate for muscle flap, surgical team looking into possibility of wound VAC     5) Prognosis  -poor  -advanced age, dementia (FAST 7c, bedbound), severe malnutrition, cva, fully dependent for ADLs, PPS<40%, albumin 1.7, hospice eligible    6) Goals of care/ advanced directives  -pt with no decison making capacity  -  HCP in [employer]: 1) Deepti Herron [phone]; and 2) Janee Parker [phone]  - MOLST page one on chart indicating CPR only, still wanting to keep this for now until they have time to discuss amongst themselves  - GOC meeting held 2/26- all issues reviewed, family unable to make decisions because they are still awaiting all of their options including surgical impression. Will touch base with Dr. Hargrove cc: conversations thus far and call family for support.     Thank you for including us in Ms. Lockhart's care. Will continue to follow with you.    Mariano Wise MD  Palliative Care Attending
85y old Female coming from [facility] with pertinent hx of profound dementia(currently non-verbal), afib (not on AC), PAD (s/p L BKA), DM2, admitted 2/18 for facial droop and LUE weakness. . Pt had a NIHSS of 18 but was not a candidate for TPA. CTH x2 showing no acute pathology and unable to do MRI. Also found to have leukocytosis with sacral OM as presumed cause. Palliative Medicine consulted to help with establishment of goals of care discussion with family.     1) CVA/ hx of dementia  - acute sx without pathology on imaging  - neurology notes appreciated- Rt acute stroke no tpa given as per conversation between neuro and family  - family still deciding about anticoagulation  - c/w with aspirin and statin  - fall and aspiration precautions  - c/w dementia meds    2) Hx of afib  - cardiology notes appreciated  - c/w verapamil for rate control  -CHADS VASC noted to be 8  -may benefit from anticoagulation but family on the fence- to let primary team know today as per notes    3) Debility  - PPSV score of 20-30  - as per NH records bedbound at baseline, incontinent of urine and stool, with few words  -seen and evaluated by speech and swallow, with follow up today as well: no signs of aspiration, c/w puree diet  - dietary notes appreciated indicating severe protein calorie malnutrition    4) Osteomyelitis pf sacrum  -currently on vanco and zosyn #7  -local wound care as tolerated  -trend cbc  -ID following   - surgical consult appreciated- not candidate for muscle flap, reassessment from woundcare being pursued cc: vac    5) Prognosis  -poor  -advanced age, dementia (FAST 7c, bedbound), severe malnutrition, cva, fully dependent for ADLs, PPS<40%, albumin 1.7, hospice eligible    6) Goals of care/ advanced directives  -pt with no decison making capacity  -  HCP in [employer]: 1) Deepti Herron [phone]; and 2) Janee Parker [phone]  - MOLST page one on chart indicating CPR only, still wanting to keep this for now until they have time to discuss amongst themselves  - GOC meeting held 2/26- all issues reviewed, family unable to make decisions because they were still awaiting all of their options including surgical impression. They received this impression as per notes and primary team, now wanting reeval for wound vac and continuation of IV abx which team is pursuing PICC for. Discussed issues at length with primary team this am and we plan to meet with family as requested tomorrow (3/6) at 9am. Primary team will set pt up for dc, hopefully for right after this.     Thank you for including us in Ms. Lockhart's care. Will continue to follow with you.    Mariano Wise MD  Palliative Care Attending
85y old Female coming from [facility] with pertinent hx of profound dementia(currently non-verbal), afib (not on AC), PAD (s/p L BKA), DM2, admitted 2/18 for facial droop and LUE weakness. . Pt had a NIHSS of 18 but was not a candidate for TPA. CTH x2 showing no acute pathology and unable to do MRI. Also found to have leukocytosis with sacral OM as presumed cause. Palliative Medicine consulted to help with establishment of goals of care discussion with family.     1) CVA/ hx of dementia  - acute sx without pathology on imaging  - neurology notes appreciated- Rt acute stroke no tpa given as per conversation between neuro and family  - AC started by primary team  - c/w with aspirin and statin  - fall and aspiration precautions  - c/w dementia meds    2) Hx of afib  - cardiology notes appreciated  - c/w verapamil for rate control  -CHADS VASC noted to be 8  -may benefit from anticoagulation but family initially on the fence- AC now started    3) Debility  - PPSV score of 20-30  - as per NH records bedbound at baseline, incontinent of urine and stool, with few words  - seen and evaluated by speech and swallow, with follow up today as well: no signs of aspiration, c/w puree diet  - dietary notes appreciated indicating severe protein calorie malnutrition    4) Osteomyelitis pf sacrum  -currently on vanco and zosyn #7  -local wound care as tolerated  -trend cbc  -ID following - plan for midline and 28 more days of ertapenem though concern that this will not lead to meaningful healing given overall debility  - surgical consult appreciated- not candidate for muscle flap, reassessment from woundcare being pursued cc: vac    5) Prognosis  -poor  -advanced age, dementia (FAST 7c, bedbound), severe malnutrition, cva, fully dependent for ADLs, PPS<40%, albumin 1.7, hospice eligible    6) Goals of care/ advanced directives  -pt with no decison making capacity  -  HCP in [employer]: 1) Deepti Herron [phone]; and 2) Janee Parker [phone]  - MOLST page one on chart indicating CPR only, still wanting to keep this for now until they have time to discuss amongst themselves  - GO meeting held 2/26- all issues reviewed, family unable to make decisions because they were still awaiting all of their options including surgical impression. They received this impression as per notes and primary team, wanting continuation of IV abx, which team is pursuing PICC for. Discussed issues at length with primary team 3/5 and we plan to meet with family as requested today (3/6) at 9am. Primary team will set pt up for dc, hopefully for right after this.   - See GOC note to follow    Thank you for including us in Ms. Lockhart's care. Will continue to follow with you.    Mariano Wise MD  Palliative Care Attending
A/P: 85F with hx of DM Type II on Insulin, PVD s/p Left  AKA, dementia (non-verbal at baseline), Afib (not on AC), Major depression, HTN sent from [facility] for unresponsiveness and left sided weakness with left facial droop.    1. CVA/TIA? In setting of afib and not on LTA. L sided weakness. 2Decho- Nl LV fxn.   Neuro following. If ok by neuro, would start anticoagulation to prevent CVA recurrence.   CHADS Vasc score is 8 and very high.   Cont tele for now. Would hold on CHARLYE in this elderly pt with known afib.   Cont asa for some CVA proph for now.    2. HTN. Allow for some permissive HTN. Cont current regimen for now.   -150s.     3. PVD. Cont asa/statin. Conservative medical mgmt.     4. Dyslipidemia. Cont statin.     5. DVT proph, replete lytes as needed. Can f/u with me as outpt.
A/P: 85F with hx of DM Type II on Insulin, PVD s/p Left  AKA, dementia (non-verbal at baseline), Afib (not on AC), Major depression, HTN sent from [facility] for unresponsiveness and left sided weakness with left facial droop.    1. CVA/TIA? In setting of afib and not on LTA. Awaiting MRI/MRA, 2Decho- Nl LV fxn.   Neuro following. If ok by neuro, would start anticoagulation to prevent CVA recurrence.   CHADS Vasc score is 8 and very high.   Cont tele for now. Would hold on CHARLEY in this elderly pt with known afib.     2. HTN. Allow for some permissive HTN. Cont current regimen for now.     3. PVD. Cont asa/statin. Conservative medical mgmt.     4. Dyslipidemia. Cont statin.     5. DVT proph, replete lytes as needed.
A/P: 85F with hx of DM Type II on Insulin, PVD s/p Left  AKA, dementia (non-verbal at baseline), Afib (not on AC), Major depression, HTN sent from [facility] for unresponsiveness and left sided weakness with left facial droop.    1. CVA/TIA? In setting of afib and not on LTA. L sided weakness. 2Decho- Nl LV fxn.   Neuro following. If ok by neuro, would start anticoagulation to prevent CVA recurrence.   CHADS Vasc score is 8 and very high.   Cont tele for now. Would hold on CHARLEY in this elderly pt with known afib.   Cont asa for some CVA proph for now.    2. HTN. Allow for some permissive HTN. Cont current regimen for now.   -150s.     3. PVD. Cont asa/statin. Conservative medical mgmt.     4. Dyslipidemia. Cont statin.     5. DVT proph, replete lytes as needed. Can f/u with me as outpt.     6. Cont abx for osteo. Mgmt as per primary team. Can d/c tele.
· Assessment		  85F with acute CVA and fever    # Acute/Subacute CVA: with some LUE weakness  # Hx of Left AKA  - Patient was unable to tolerate MRI of the head/neck, Hence repeat CT of head after 48 hours did not show any changes  - ASA, Lipitor  -Cardiology eval appreciated, high CHADS score for afib, should be AC, d/w  who discussed with family and they want to wait for AC at this time  -speech and swallow/dysphagia eval appreciated, puree diet  -fall px, aspiration px  -BP control    * Osteomyelitis of the sacral area  -vanco and zosyn  -ID Cx appreciated, d/w   -f/u all cultures, blood/urine  - MRI of Pelvis with OM  -tylenol prn fever    *AFib, not on AC  - discussed with daughter in detail over the phone and family in the room, r/b of AC, they want to still think about it.   - c/w metoprolol, verapamil  - rate controlled  - f/u cardio    *PVD s/p left AKA  -fall px  -c/w ASA    *HTN:  -c/w metoprolol, verapamil    *Dementia  -supportive care  -aspiration/fall px    *Depression  -mood appears stable    *DVT px:  -Heparin sq    * Hypoglycemia: ? even on D5  - lantus dose reduced to 25 units  - endo evaluation appreciated.     Discusssed in length today about treating Osteomyelitis;  antibiotics with comfort care VS antibiotis/debridement/wound Vac/colostomy to prevent recurrent infection/muscle flap.   Given her age and condition, surgical option is high risk and may not even be a candidate  Family to discuss among themselves and offer a decision.

## 2019-03-06 NOTE — PROGRESS NOTE ADULT - SUBJECTIVE AND OBJECTIVE BOX
Patient is a 85y old  Female who presents with a chief complaint of CVA (19 Feb 2019 10:17)    Date of service: 03-06-19 @ 14:41    Patient lying in bed  Afebrile      ROS unable to obtain secondary to patient medical condition     MEDICATIONS  (STANDING):  acetaminophen  Suppository .. 650 milliGRAM(s) Rectal once  ascorbic acid 500 milliGRAM(s) Oral daily  aspirin  chewable 81 milliGRAM(s) Oral daily  atorvastatin 40 milliGRAM(s) Oral at bedtime  calcium carbonate 1250 mG  + Vitamin D (OsCal 500 + D) 1 Tablet(s) Oral two times a day  desmopressin 0.01% Nasal 1 Spray(s) Nasal daily  dextrose 5%. 1000 milliLiter(s) (50 mL/Hr) IV Continuous <Continuous>  dextrose 50% Injectable 12.5 Gram(s) IV Push once  dextrose 50% Injectable 25 Gram(s) IV Push once  dextrose 50% Injectable 25 Gram(s) IV Push once  docusate sodium 100 milliGRAM(s) Oral two times a day  doxycycline hyclate Capsule 100 milliGRAM(s) Oral every 12 hours  insulin glargine Injectable (LANTUS) 10 Unit(s) SubCutaneous at bedtime  insulin lispro (HumaLOG) corrective regimen sliding scale   SubCutaneous three times a day before meals  insulin lispro (HumaLOG) corrective regimen sliding scale   SubCutaneous at bedtime  melatonin 3 milliGRAM(s) Oral at bedtime  memantine 10 milliGRAM(s) Oral two times a day  metoprolol tartrate 12.5 milliGRAM(s) Oral two times a day  mirtazapine 15 milliGRAM(s) Oral at bedtime  rivaroxaban 15 milliGRAM(s) Oral every 24 hours  verapamil 80 milliGRAM(s) Oral daily  zinc sulfate 220 milliGRAM(s) Oral daily    MEDICATIONS  (PRN):  acetaminophen   Tablet .. 650 milliGRAM(s) Oral every 6 hours PRN Temp greater or equal to 38C (100.4F), Mild Pain (1 - 3)  dextrose 40% Gel 15 Gram(s) Oral once PRN Blood Glucose LESS THAN 70 milliGRAM(s)/deciliter  glucagon  Injectable 1 milliGRAM(s) IntraMuscular once PRN Glucose LESS THAN 70 milligrams/deciliter      Vital Signs Last 24 Hrs  T(C): 36.9 (06 Mar 2019 11:06), Max: 37.2 (05 Mar 2019 20:49)  T(F): 98.4 (06 Mar 2019 11:06), Max: 99 (05 Mar 2019 20:49)  HR: 73 (06 Mar 2019 11:06) (70 - 74)  BP: 99/67 (06 Mar 2019 11:06) (99/67 - 143/80)  BP(mean): --  RR: 18 (06 Mar 2019 11:06) (17 - 18)  SpO2: 100% (06 Mar 2019 11:06) (100% - 100%)    Physical Exam:      Constitutional: frail looking  HEENT: NC/AT, EOMI, PERRLA, conjunctivae clear; ears and nose atraumatic; pharynx clear  Neck: supple; thyroid not palpable  Back: no tenderness  Respiratory: respiratory effort normal  Cardiovascular: S1S2 regular, no murmurs  Abdomen: soft, not tender, not distended, positive BS; no liver or spleen organomegaly  Genitourinary: no suprapubic tenderness  Musculoskeletal: no muscle tenderness; left AKA  Neurological/ Psychiatric: nonverbal  Skin: no rashes; 3 cm ulcer over sacrum visible bone, foul smelling brown drainage    Labs: all available labs reviewed              Labs:              Labs:    03-04    152<H>  |  113<H>  |  13  ----------------------------<  151<H>  3.6   |  30  |  1.41<H>    Ca    9.0      04 Mar 2019 05:41             Cultures:                 Radiology: all available radiological tests reviewed    Advanced directives addressed: full resuscitation

## 2019-03-06 NOTE — PROGRESS NOTE ADULT - NSHPATTENDINGPLANDISCUSS_GEN_ALL_CORE
patient, nursing, staff
patient, nursing,staff
patient, nursing,staff, Dr. Wise
team
daughters ([phone])
patient, nursing, staff

## 2019-03-09 DIAGNOSIS — F03.90 UNSPECIFIED DEMENTIA WITHOUT BEHAVIORAL DISTURBANCE: ICD-10-CM

## 2019-03-09 DIAGNOSIS — E87.6 HYPOKALEMIA: ICD-10-CM

## 2019-03-09 DIAGNOSIS — Z51.5 ENCOUNTER FOR PALLIATIVE CARE: ICD-10-CM

## 2019-03-09 DIAGNOSIS — E11.69 TYPE 2 DIABETES MELLITUS WITH OTHER SPECIFIED COMPLICATION: ICD-10-CM

## 2019-03-09 DIAGNOSIS — M86.68 OTHER CHRONIC OSTEOMYELITIS, OTHER SITE: ICD-10-CM

## 2019-03-09 DIAGNOSIS — F32.9 MAJOR DEPRESSIVE DISORDER, SINGLE EPISODE, UNSPECIFIED: ICD-10-CM

## 2019-03-09 DIAGNOSIS — D72.829 ELEVATED WHITE BLOOD CELL COUNT, UNSPECIFIED: ICD-10-CM

## 2019-03-09 DIAGNOSIS — I12.9 HYPERTENSIVE CHRONIC KIDNEY DISEASE WITH STAGE 1 THROUGH STAGE 4 CHRONIC KIDNEY DISEASE, OR UNSPECIFIED CHRONIC KIDNEY DISEASE: ICD-10-CM

## 2019-03-09 DIAGNOSIS — Z88.8 ALLERGY STATUS TO OTHER DRUGS, MEDICAMENTS AND BIOLOGICAL SUBSTANCES STATUS: ICD-10-CM

## 2019-03-09 DIAGNOSIS — E11.22 TYPE 2 DIABETES MELLITUS WITH DIABETIC CHRONIC KIDNEY DISEASE: ICD-10-CM

## 2019-03-09 DIAGNOSIS — G81.94 HEMIPLEGIA, UNSPECIFIED AFFECTING LEFT NONDOMINANT SIDE: ICD-10-CM

## 2019-03-09 DIAGNOSIS — E87.0 HYPEROSMOLALITY AND HYPERNATREMIA: ICD-10-CM

## 2019-03-09 DIAGNOSIS — G93.41 METABOLIC ENCEPHALOPATHY: ICD-10-CM

## 2019-03-09 DIAGNOSIS — I73.9 PERIPHERAL VASCULAR DISEASE, UNSPECIFIED: ICD-10-CM

## 2019-03-09 DIAGNOSIS — E11.649 TYPE 2 DIABETES MELLITUS WITH HYPOGLYCEMIA WITHOUT COMA: ICD-10-CM

## 2019-03-09 DIAGNOSIS — Z88.2 ALLERGY STATUS TO SULFONAMIDES: ICD-10-CM

## 2019-03-09 DIAGNOSIS — E11.51 TYPE 2 DIABETES MELLITUS WITH DIABETIC PERIPHERAL ANGIOPATHY WITHOUT GANGRENE: ICD-10-CM

## 2019-03-09 DIAGNOSIS — Z89.512 ACQUIRED ABSENCE OF LEFT LEG BELOW KNEE: ICD-10-CM

## 2019-03-09 DIAGNOSIS — N17.9 ACUTE KIDNEY FAILURE, UNSPECIFIED: ICD-10-CM

## 2019-03-09 DIAGNOSIS — I48.91 UNSPECIFIED ATRIAL FIBRILLATION: ICD-10-CM

## 2019-03-09 DIAGNOSIS — Z79.82 LONG TERM (CURRENT) USE OF ASPIRIN: ICD-10-CM

## 2019-03-09 DIAGNOSIS — N18.3 CHRONIC KIDNEY DISEASE, STAGE 3 (MODERATE): ICD-10-CM

## 2019-03-09 DIAGNOSIS — L89.154 PRESSURE ULCER OF SACRAL REGION, STAGE 4: ICD-10-CM

## 2019-03-09 DIAGNOSIS — I63.9 CEREBRAL INFARCTION, UNSPECIFIED: ICD-10-CM

## 2019-03-09 DIAGNOSIS — Z79.899 OTHER LONG TERM (CURRENT) DRUG THERAPY: ICD-10-CM

## 2019-03-09 DIAGNOSIS — E43 UNSPECIFIED SEVERE PROTEIN-CALORIE MALNUTRITION: ICD-10-CM

## 2019-03-09 DIAGNOSIS — J69.0 PNEUMONITIS DUE TO INHALATION OF FOOD AND VOMIT: ICD-10-CM

## 2019-03-09 DIAGNOSIS — Z79.4 LONG TERM (CURRENT) USE OF INSULIN: ICD-10-CM

## 2019-03-09 DIAGNOSIS — E86.0 DEHYDRATION: ICD-10-CM

## 2020-02-05 NOTE — GOALS OF CARE CONVERSATION - PERSONAL ADVANCE DIRECTIVE - TREATMENT GUIDELINE COMMENT
patient
as outlined above.
MOLST decisions: DNR, comfort measures (upon dc), DNI, do not send, no feeding tube, no IVF (upon dc), determine use or limitation of abx, no dialysis, no transfusions, no icu, no pressors.    *Spent 91 minutes discussing GOC with family including Advance care planning, explanation and discussion of advance directives, reviewed all treatment/dispo options, and completing MOLST.

## 2021-10-05 NOTE — ED PROVIDER NOTE - DISPOSITION TYPE
Quality 402: Tobacco Use And Help With Quitting Among Adolescents: Patient screened for tobacco and never smoked Quality 130: Documentation Of Current Medications In The Medical Record: Documentation of a medical reason(s) for not documenting, updating, or reviewing the patientâs current medications list (e.g., patient is in an urgent or emergent medical situation) Additional Notes: Patient unsure of dosage and frequency Detail Level: Detailed ADMIT

## 2022-01-21 NOTE — DIETITIAN INITIAL EVALUATION ADULT. - EST PROTEIN NEEDS12
What Type Of Note Output Would You Prefer (Optional)?: Standard Output
How Severe Is Your Skin Lesion?: mild
Has Your Skin Lesion Been Treated?: not been treated
Is This A New Presentation, Or A Follow-Up?: Growth
111.6

## 2023-10-27 NOTE — PATIENT PROFILE ADULT. - NS PRO OT REFERRAL QUES 2 YN
Clofazimine Pregnancy And Lactation Text: This medication is Pregnancy Category C and isn't considered safe during pregnancy. It is excreted in breast milk.
home
unable to assess, pt has dementia

## 2023-12-26 NOTE — SWALLOW BEDSIDE ASSESSMENT ADULT - SLP PRECAUTIONS/LIMITATIONS: HEARING
Unable to assess due to altered mentation. Patient w/ fever 101.6 degrees F, no other SIRS criteria met. Found to be COVID-19 positive.   - continue to monitor off antibiotics  - Patient w/ fever 101.6 degrees F, no other SIRS criteria met. Found to be COVID-19 positive. Fevers likely 2/2 COVID-19 infection.  - continue to monitor off antibiotics  - f/u blood cultures

## 2024-03-23 NOTE — PATIENT PROFILE ADULT. - PRO SERVICES AT DISCH
Improved.  Stable s statin.  Recommend lifestyle modifications.    The 10-year ASCVD risk score (Mark COLE, et al., 2019) is: 1.4%    Values used to calculate the score:      Age: 56 years      Sex: Female      Is Non- : No      Diabetic: No      Tobacco smoker: No      Systolic Blood Pressure: 106 mmHg      Is BP treated: No      HDL Cholesterol: 68 mg/dL      Total Cholesterol: 224 mg/dL     unsure
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
## 2025-07-08 NOTE — ED ADULT NURSE NOTE - NSFALLRSKASSESSTYPE_ED_ALL_ED
Patient: Sandhya Christine    Procedure Summary       Date: 07/08/25 Room / Location: [facility] ROOM 25 / SURGERY SAME DAY [facility]    Anesthesia Start: 1103 Anesthesia Stop: 1213    Procedures:       CYSTOSCOPY WITH RIGHT URETEROSCOPY LASER LITHOTRIPSY AND URETERAL STENT PLACEMENT BLADDER BIOPSY (Right: Bladder)      CYSTOSCOPY, WITH BLADDER BIOPSY (Bladder) Diagnosis: (KIDNEY STONE, GROSS HEMATURIA)    Surgeons: Nayan Cobian M.D. Responsible Provider: Stefania Kim M.D.    Anesthesia Type: general ASA Status: 2            Final Anesthesia Type: general  Last vitals  BP   Blood Pressure : (!) 143/83    Temp   36.4 °C (97.6 °F)    Pulse   82   Resp   18    SpO2   91 %      Anesthesia Post Evaluation    Patient location during evaluation: PACU  Patient participation: complete - patient participated  Level of consciousness: awake and alert    Airway patency: patent  Anesthetic complications: no  Cardiovascular status: hemodynamically stable  Respiratory status: acceptable  Hydration status: euvolemic    PONV: none          No notable events documented.     Nurse Pain Score: 0 (NPRS)          
Initial (On Arrival)